# Patient Record
Sex: MALE | Race: WHITE | NOT HISPANIC OR LATINO | Employment: FULL TIME | URBAN - METROPOLITAN AREA
[De-identification: names, ages, dates, MRNs, and addresses within clinical notes are randomized per-mention and may not be internally consistent; named-entity substitution may affect disease eponyms.]

---

## 2024-09-20 ENCOUNTER — LAB REQUISITION (OUTPATIENT)
Dept: LAB | Facility: HOSPITAL | Age: 59
End: 2024-09-20
Payer: COMMERCIAL

## 2024-09-20 DIAGNOSIS — N20.1 CALCULUS OF URETER: ICD-10-CM

## 2024-09-20 PROCEDURE — 82365 CALCULUS SPECTROSCOPY: CPT | Performed by: STUDENT IN AN ORGANIZED HEALTH CARE EDUCATION/TRAINING PROGRAM

## 2024-09-30 LAB
CALCIUM OXALATE DIHYDRATE MFR STONE IR: 30 %
COLOR STONE: NORMAL
COM MFR STONE: 70 %
COMPN STONE: NORMAL
LABORATORY COMMENT REPORT: NORMAL
LABORATORY COMMENT REPORT: NORMAL
Lab: NORMAL
Lab: NORMAL
PHOTO: NORMAL
SIZE STONE: NORMAL MM
SPEC SOURCE SUBJ: NORMAL
WT STONE: 154 MG

## 2024-11-27 ENCOUNTER — LAB REQUISITION (OUTPATIENT)
Dept: LAB | Facility: HOSPITAL | Age: 59
End: 2024-11-27
Payer: COMMERCIAL

## 2024-11-27 DIAGNOSIS — N20.1 CALCULUS OF URETER: ICD-10-CM

## 2024-11-27 PROCEDURE — 82365 CALCULUS SPECTROSCOPY: CPT | Performed by: UROLOGY

## 2024-11-28 ENCOUNTER — HOSPITAL ENCOUNTER (INPATIENT)
Facility: HOSPITAL | Age: 59
LOS: 4 days | Discharge: HOME | End: 2024-12-03
Attending: EMERGENCY MEDICINE | Admitting: STUDENT IN AN ORGANIZED HEALTH CARE EDUCATION/TRAINING PROGRAM
Payer: COMMERCIAL

## 2024-11-28 ENCOUNTER — APPOINTMENT (OUTPATIENT)
Dept: CT IMAGING | Facility: HOSPITAL | Age: 59
End: 2024-11-28
Payer: COMMERCIAL

## 2024-11-28 ENCOUNTER — APPOINTMENT (OUTPATIENT)
Dept: GENERAL RADIOLOGY | Facility: HOSPITAL | Age: 59
End: 2024-11-28
Payer: COMMERCIAL

## 2024-11-28 DIAGNOSIS — A41.9 SEPSIS, DUE TO UNSPECIFIED ORGANISM, UNSPECIFIED WHETHER ACUTE ORGAN DYSFUNCTION PRESENT: Primary | ICD-10-CM

## 2024-11-28 DIAGNOSIS — N12 PYELONEPHRITIS: ICD-10-CM

## 2024-11-28 LAB
ALBUMIN SERPL-MCNC: 4 G/DL (ref 3.5–5.2)
ALBUMIN/GLOB SERPL: 1.4 G/DL
ALP SERPL-CCNC: 80 U/L (ref 39–117)
ALT SERPL W P-5'-P-CCNC: 17 U/L (ref 1–41)
ANION GAP SERPL CALCULATED.3IONS-SCNC: 10.3 MMOL/L (ref 5–15)
AST SERPL-CCNC: 16 U/L (ref 1–40)
ATMOSPHERIC PRESS: 745.8 MMHG
B-OH-BUTYR SERPL-SCNC: 0.15 MMOL/L (ref 0.02–0.27)
BACTERIA UR QL AUTO: ABNORMAL /HPF
BASE EXCESS BLDV CALC-SCNC: -1.7 MMOL/L (ref -2–2)
BASOPHILS # BLD AUTO: 0.02 10*3/MM3 (ref 0–0.2)
BASOPHILS NFR BLD AUTO: 0.2 % (ref 0–1.5)
BILIRUB SERPL-MCNC: 0.8 MG/DL (ref 0–1.2)
BILIRUB UR QL STRIP: NEGATIVE
BUN SERPL-MCNC: 31 MG/DL (ref 6–20)
BUN/CREAT SERPL: 23.7 (ref 7–25)
CALCIUM SPEC-SCNC: 9.2 MG/DL (ref 8.6–10.5)
CHLORIDE SERPL-SCNC: 98 MMOL/L (ref 98–107)
CLARITY UR: CLEAR
CO2 BLDA-SCNC: 23.7 MMOL/L (ref 23–27)
CO2 SERPL-SCNC: 24.7 MMOL/L (ref 22–29)
COLOR UR: ABNORMAL
CREAT SERPL-MCNC: 1.31 MG/DL (ref 0.76–1.27)
CRP SERPL-MCNC: 10.56 MG/DL (ref 0–0.5)
D-LACTATE SERPL-SCNC: 1 MMOL/L (ref 0.5–2)
D-LACTATE SERPL-SCNC: 2.1 MMOL/L (ref 0.5–2)
DEPRECATED RDW RBC AUTO: 40.5 FL (ref 37–54)
EGFRCR SERPLBLD CKD-EPI 2021: 62.7 ML/MIN/1.73
EOSINOPHIL # BLD AUTO: 0.47 10*3/MM3 (ref 0–0.4)
EOSINOPHIL NFR BLD AUTO: 5.6 % (ref 0.3–6.2)
ERYTHROCYTE [DISTWIDTH] IN BLOOD BY AUTOMATED COUNT: 12.3 % (ref 12.3–15.4)
FLUAV SUBTYP SPEC NAA+PROBE: NOT DETECTED
FLUBV RNA ISLT QL NAA+PROBE: NOT DETECTED
GLOBULIN UR ELPH-MCNC: 2.9 GM/DL
GLUCOSE BLDC GLUCOMTR-MCNC: 177 MG/DL (ref 70–130)
GLUCOSE SERPL-MCNC: 415 MG/DL (ref 65–99)
GLUCOSE UR STRIP-MCNC: ABNORMAL MG/DL
HCO3 BLDV-SCNC: 22.6 MMOL/L (ref 22–26)
HCT VFR BLD AUTO: 45 % (ref 37.5–51)
HGB BLD-MCNC: 15.2 G/DL (ref 13–17.7)
HGB UR QL STRIP.AUTO: ABNORMAL
HOLD SPECIMEN: NORMAL
HOLD SPECIMEN: NORMAL
HYALINE CASTS UR QL AUTO: ABNORMAL /LPF
IMM GRANULOCYTES # BLD AUTO: 0.01 10*3/MM3 (ref 0–0.05)
IMM GRANULOCYTES NFR BLD AUTO: 0.1 % (ref 0–0.5)
KETONES UR QL STRIP: NEGATIVE
LEUKOCYTE ESTERASE UR QL STRIP.AUTO: NEGATIVE
LIPASE SERPL-CCNC: 25 U/L (ref 13–60)
LYMPHOCYTES # BLD AUTO: 0.7 10*3/MM3 (ref 0.7–3.1)
LYMPHOCYTES NFR BLD AUTO: 8.3 % (ref 19.6–45.3)
MAGNESIUM SERPL-MCNC: 2.1 MG/DL (ref 1.6–2.6)
MCH RBC QN AUTO: 29.9 PG (ref 26.6–33)
MCHC RBC AUTO-ENTMCNC: 33.8 G/DL (ref 31.5–35.7)
MCV RBC AUTO: 88.6 FL (ref 79–97)
MODALITY: ABNORMAL
MONOCYTES # BLD AUTO: 0.83 10*3/MM3 (ref 0.1–0.9)
MONOCYTES NFR BLD AUTO: 9.9 % (ref 5–12)
NEUTROPHILS NFR BLD AUTO: 6.37 10*3/MM3 (ref 1.7–7)
NEUTROPHILS NFR BLD AUTO: 75.9 % (ref 42.7–76)
NITRITE UR QL STRIP: POSITIVE
PCO2 BLDV: 36.3 MM HG (ref 41–51)
PH BLDV: 7.4 PH UNITS (ref 7.31–7.41)
PH UR STRIP.AUTO: <=5 [PH] (ref 5–8)
PLATELET # BLD AUTO: 165 10*3/MM3 (ref 140–450)
PMV BLD AUTO: 9.5 FL (ref 6–12)
PO2 BLDV: 44.8 MM HG (ref 35–42)
POTASSIUM SERPL-SCNC: 4 MMOL/L (ref 3.5–5.2)
PROCALCITONIN SERPL-MCNC: 0.75 NG/ML (ref 0–0.25)
PROT SERPL-MCNC: 6.9 G/DL (ref 6–8.5)
PROT UR QL STRIP: ABNORMAL
RBC # BLD AUTO: 5.08 10*6/MM3 (ref 4.14–5.8)
RBC # UR STRIP: ABNORMAL /HPF
REF LAB TEST METHOD: ABNORMAL
SAO2 % BLDCOV: 81 % (ref 45–75)
SARS-COV-2 RNA RESP QL NAA+PROBE: NOT DETECTED
SODIUM SERPL-SCNC: 133 MMOL/L (ref 136–145)
SP GR UR STRIP: <=1.005 (ref 1–1.03)
SQUAMOUS #/AREA URNS HPF: ABNORMAL /HPF
STREP A PCR: NOT DETECTED
UROBILINOGEN UR QL STRIP: ABNORMAL
WBC # UR STRIP: ABNORMAL /HPF
WBC NRBC COR # BLD AUTO: 8.4 10*3/MM3 (ref 3.4–10.8)
WHOLE BLOOD HOLD COAG: NORMAL
YEAST URNS QL MICRO: PRESENT /HPF

## 2024-11-28 PROCEDURE — 83735 ASSAY OF MAGNESIUM: CPT | Performed by: EMERGENCY MEDICINE

## 2024-11-28 PROCEDURE — 87181 SC STD AGAR DILUTION PER AGT: CPT | Performed by: EMERGENCY MEDICINE

## 2024-11-28 PROCEDURE — 99285 EMERGENCY DEPT VISIT HI MDM: CPT | Performed by: EMERGENCY MEDICINE

## 2024-11-28 PROCEDURE — 85025 COMPLETE CBC W/AUTO DIFF WBC: CPT | Performed by: EMERGENCY MEDICINE

## 2024-11-28 PROCEDURE — 87636 SARSCOV2 & INF A&B AMP PRB: CPT | Performed by: EMERGENCY MEDICINE

## 2024-11-28 PROCEDURE — 87040 BLOOD CULTURE FOR BACTERIA: CPT | Performed by: EMERGENCY MEDICINE

## 2024-11-28 PROCEDURE — 71045 X-RAY EXAM CHEST 1 VIEW: CPT

## 2024-11-28 PROCEDURE — 87077 CULTURE AEROBIC IDENTIFY: CPT | Performed by: EMERGENCY MEDICINE

## 2024-11-28 PROCEDURE — 84145 PROCALCITONIN (PCT): CPT | Performed by: EMERGENCY MEDICINE

## 2024-11-28 PROCEDURE — 87186 SC STD MICRODIL/AGAR DIL: CPT | Performed by: EMERGENCY MEDICINE

## 2024-11-28 PROCEDURE — 83605 ASSAY OF LACTIC ACID: CPT | Performed by: EMERGENCY MEDICINE

## 2024-11-28 PROCEDURE — 25010000002 CEFTRIAXONE PER 250 MG: Performed by: PHYSICIAN ASSISTANT

## 2024-11-28 PROCEDURE — 74177 CT ABD & PELVIS W/CONTRAST: CPT

## 2024-11-28 PROCEDURE — 81001 URINALYSIS AUTO W/SCOPE: CPT | Performed by: EMERGENCY MEDICINE

## 2024-11-28 PROCEDURE — 99285 EMERGENCY DEPT VISIT HI MDM: CPT

## 2024-11-28 PROCEDURE — 80053 COMPREHEN METABOLIC PANEL: CPT | Performed by: EMERGENCY MEDICINE

## 2024-11-28 PROCEDURE — 82803 BLOOD GASES ANY COMBINATION: CPT | Performed by: EMERGENCY MEDICINE

## 2024-11-28 PROCEDURE — 82010 KETONE BODYS QUAN: CPT | Performed by: EMERGENCY MEDICINE

## 2024-11-28 PROCEDURE — 83690 ASSAY OF LIPASE: CPT | Performed by: EMERGENCY MEDICINE

## 2024-11-28 PROCEDURE — 87651 STREP A DNA AMP PROBE: CPT | Performed by: EMERGENCY MEDICINE

## 2024-11-28 PROCEDURE — 63710000001 INSULIN REGULAR HUMAN PER 5 UNITS: Performed by: EMERGENCY MEDICINE

## 2024-11-28 PROCEDURE — 87154 CUL TYP ID BLD PTHGN 6+ TRGT: CPT | Performed by: EMERGENCY MEDICINE

## 2024-11-28 PROCEDURE — 25810000003 SODIUM CHLORIDE 0.9 % SOLUTION: Performed by: EMERGENCY MEDICINE

## 2024-11-28 PROCEDURE — 36415 COLL VENOUS BLD VENIPUNCTURE: CPT

## 2024-11-28 PROCEDURE — 87086 URINE CULTURE/COLONY COUNT: CPT | Performed by: EMERGENCY MEDICINE

## 2024-11-28 PROCEDURE — 86140 C-REACTIVE PROTEIN: CPT | Performed by: EMERGENCY MEDICINE

## 2024-11-28 PROCEDURE — 82948 REAGENT STRIP/BLOOD GLUCOSE: CPT

## 2024-11-28 PROCEDURE — 25510000001 IOPAMIDOL PER 1 ML: Performed by: EMERGENCY MEDICINE

## 2024-11-28 RX ORDER — IOPAMIDOL 755 MG/ML
100 INJECTION, SOLUTION INTRAVASCULAR
Status: COMPLETED | OUTPATIENT
Start: 2024-11-28 | End: 2024-11-28

## 2024-11-28 RX ORDER — ACETAMINOPHEN 500 MG
1000 TABLET ORAL ONCE
Status: COMPLETED | OUTPATIENT
Start: 2024-11-28 | End: 2024-11-28

## 2024-11-28 RX ADMIN — INSULIN HUMAN 8 UNITS: 100 INJECTION, SOLUTION PARENTERAL at 20:51

## 2024-11-28 RX ADMIN — IOPAMIDOL 100 ML: 755 INJECTION, SOLUTION INTRAVENOUS at 21:14

## 2024-11-28 RX ADMIN — ACETAMINOPHEN 1000 MG: 500 TABLET ORAL at 20:06

## 2024-11-28 RX ADMIN — SODIUM CHLORIDE 1000 ML: 9 INJECTION, SOLUTION INTRAVENOUS at 20:06

## 2024-11-28 RX ADMIN — SODIUM CHLORIDE 1000 ML: 9 INJECTION, SOLUTION INTRAVENOUS at 21:18

## 2024-11-28 RX ADMIN — CEFTRIAXONE 2000 MG: 2 INJECTION, POWDER, FOR SOLUTION INTRAMUSCULAR; INTRAVENOUS at 20:36

## 2024-11-29 PROBLEM — E11.65 TYPE 2 DIABETES MELLITUS WITH HYPERGLYCEMIA, WITHOUT LONG-TERM CURRENT USE OF INSULIN: Status: ACTIVE | Noted: 2024-11-29

## 2024-11-29 PROBLEM — N39.0 ACUTE UTI (URINARY TRACT INFECTION): Status: ACTIVE | Noted: 2024-11-29

## 2024-11-29 LAB
ANION GAP SERPL CALCULATED.3IONS-SCNC: 11 MMOL/L (ref 5–15)
BUN SERPL-MCNC: 22 MG/DL (ref 6–20)
BUN/CREAT SERPL: 22 (ref 7–25)
CALCIUM SPEC-SCNC: 8.2 MG/DL (ref 8.6–10.5)
CHLORIDE SERPL-SCNC: 106 MMOL/L (ref 98–107)
CO2 SERPL-SCNC: 21 MMOL/L (ref 22–29)
CREAT SERPL-MCNC: 1 MG/DL (ref 0.76–1.27)
DEPRECATED RDW RBC AUTO: 38.8 FL (ref 37–54)
EGFRCR SERPLBLD CKD-EPI 2021: 86.7 ML/MIN/1.73
ERYTHROCYTE [DISTWIDTH] IN BLOOD BY AUTOMATED COUNT: 12.2 % (ref 12.3–15.4)
GLUCOSE BLDC GLUCOMTR-MCNC: 116 MG/DL (ref 70–130)
GLUCOSE BLDC GLUCOMTR-MCNC: 124 MG/DL (ref 70–130)
GLUCOSE BLDC GLUCOMTR-MCNC: 151 MG/DL (ref 70–130)
GLUCOSE BLDC GLUCOMTR-MCNC: 183 MG/DL (ref 70–130)
GLUCOSE SERPL-MCNC: 135 MG/DL (ref 65–99)
HBA1C MFR BLD: 8.6 % (ref 4.8–5.6)
HCT VFR BLD AUTO: 38.6 % (ref 37.5–51)
HGB BLD-MCNC: 13.3 G/DL (ref 13–17.7)
MCH RBC QN AUTO: 30.1 PG (ref 26.6–33)
MCHC RBC AUTO-ENTMCNC: 34.5 G/DL (ref 31.5–35.7)
MCV RBC AUTO: 87.3 FL (ref 79–97)
PLATELET # BLD AUTO: 166 10*3/MM3 (ref 140–450)
PMV BLD AUTO: 9.6 FL (ref 6–12)
POTASSIUM SERPL-SCNC: 3.7 MMOL/L (ref 3.5–5.2)
RBC # BLD AUTO: 4.42 10*6/MM3 (ref 4.14–5.8)
SODIUM SERPL-SCNC: 138 MMOL/L (ref 136–145)
WBC NRBC COR # BLD AUTO: 9.72 10*3/MM3 (ref 3.4–10.8)

## 2024-11-29 PROCEDURE — 82948 REAGENT STRIP/BLOOD GLUCOSE: CPT

## 2024-11-29 PROCEDURE — 80048 BASIC METABOLIC PNL TOTAL CA: CPT

## 2024-11-29 PROCEDURE — 25010000002 CEFTRIAXONE PER 250 MG

## 2024-11-29 PROCEDURE — 25810000003 SODIUM CHLORIDE 0.9 % SOLUTION

## 2024-11-29 PROCEDURE — 83036 HEMOGLOBIN GLYCOSYLATED A1C: CPT

## 2024-11-29 PROCEDURE — 85027 COMPLETE CBC AUTOMATED: CPT

## 2024-11-29 PROCEDURE — 63710000001 INSULIN LISPRO (HUMAN) PER 5 UNITS

## 2024-11-29 RX ORDER — DEXTROSE MONOHYDRATE 25 G/50ML
25 INJECTION, SOLUTION INTRAVENOUS
Status: DISCONTINUED | OUTPATIENT
Start: 2024-11-29 | End: 2024-12-03 | Stop reason: HOSPADM

## 2024-11-29 RX ORDER — FLUCONAZOLE 200 MG/1
200 TABLET ORAL EVERY 24 HOURS
Status: DISCONTINUED | OUTPATIENT
Start: 2024-11-29 | End: 2024-11-30

## 2024-11-29 RX ORDER — OXYCODONE AND ACETAMINOPHEN 7.5; 325 MG/1; MG/1
1 TABLET ORAL EVERY 6 HOURS PRN
COMMUNITY

## 2024-11-29 RX ORDER — ONDANSETRON 4 MG/1
4 TABLET, ORALLY DISINTEGRATING ORAL EVERY 6 HOURS PRN
Status: DISCONTINUED | OUTPATIENT
Start: 2024-11-29 | End: 2024-12-03 | Stop reason: HOSPADM

## 2024-11-29 RX ORDER — ROSUVASTATIN CALCIUM 20 MG/1
20 TABLET, COATED ORAL DAILY
Status: DISCONTINUED | OUTPATIENT
Start: 2024-11-29 | End: 2024-12-03 | Stop reason: HOSPADM

## 2024-11-29 RX ORDER — OXYCODONE AND ACETAMINOPHEN 7.5; 325 MG/1; MG/1
1 TABLET ORAL EVERY 4 HOURS PRN
Status: DISPENSED | OUTPATIENT
Start: 2024-11-29 | End: 2024-12-01

## 2024-11-29 RX ORDER — OXYCODONE AND ACETAMINOPHEN 7.5; 325 MG/1; MG/1
1 TABLET ORAL EVERY 6 HOURS PRN
Status: COMPLETED | OUTPATIENT
Start: 2024-11-29 | End: 2024-11-29

## 2024-11-29 RX ORDER — POLYETHYLENE GLYCOL 3350 17 G/17G
17 POWDER, FOR SOLUTION ORAL DAILY PRN
Status: DISCONTINUED | OUTPATIENT
Start: 2024-11-29 | End: 2024-12-03 | Stop reason: HOSPADM

## 2024-11-29 RX ORDER — ONDANSETRON 2 MG/ML
4 INJECTION INTRAMUSCULAR; INTRAVENOUS EVERY 6 HOURS PRN
Status: DISCONTINUED | OUTPATIENT
Start: 2024-11-29 | End: 2024-12-03 | Stop reason: HOSPADM

## 2024-11-29 RX ORDER — BISACODYL 5 MG/1
5 TABLET, DELAYED RELEASE ORAL DAILY PRN
Status: DISCONTINUED | OUTPATIENT
Start: 2024-11-29 | End: 2024-12-03 | Stop reason: HOSPADM

## 2024-11-29 RX ORDER — INSULIN LISPRO 100 [IU]/ML
2-7 INJECTION, SOLUTION INTRAVENOUS; SUBCUTANEOUS
Status: DISCONTINUED | OUTPATIENT
Start: 2024-11-29 | End: 2024-12-03 | Stop reason: HOSPADM

## 2024-11-29 RX ORDER — TAMSULOSIN HYDROCHLORIDE 0.4 MG/1
0.4 CAPSULE ORAL DAILY
Status: DISCONTINUED | OUTPATIENT
Start: 2024-11-29 | End: 2024-12-03 | Stop reason: HOSPADM

## 2024-11-29 RX ORDER — NICOTINE POLACRILEX 4 MG
15 LOZENGE BUCCAL
Status: DISCONTINUED | OUTPATIENT
Start: 2024-11-29 | End: 2024-12-03 | Stop reason: HOSPADM

## 2024-11-29 RX ORDER — IBUPROFEN 600 MG/1
1 TABLET ORAL
Status: DISCONTINUED | OUTPATIENT
Start: 2024-11-29 | End: 2024-12-03 | Stop reason: HOSPADM

## 2024-11-29 RX ORDER — METHOCARBAMOL 750 MG/1
750 TABLET, FILM COATED ORAL 3 TIMES DAILY PRN
Status: DISCONTINUED | OUTPATIENT
Start: 2024-11-29 | End: 2024-12-03 | Stop reason: HOSPADM

## 2024-11-29 RX ORDER — PHENAZOPYRIDINE HYDROCHLORIDE 200 MG/1
200 TABLET, FILM COATED ORAL
Status: COMPLETED | OUTPATIENT
Start: 2024-11-29 | End: 2024-12-01

## 2024-11-29 RX ORDER — BISACODYL 10 MG
10 SUPPOSITORY, RECTAL RECTAL DAILY PRN
Status: DISCONTINUED | OUTPATIENT
Start: 2024-11-29 | End: 2024-12-03 | Stop reason: HOSPADM

## 2024-11-29 RX ORDER — AMOXICILLIN 250 MG
2 CAPSULE ORAL 2 TIMES DAILY PRN
Status: DISCONTINUED | OUTPATIENT
Start: 2024-11-29 | End: 2024-12-03 | Stop reason: HOSPADM

## 2024-11-29 RX ORDER — NITROGLYCERIN 0.4 MG/1
0.4 TABLET SUBLINGUAL
Status: DISCONTINUED | OUTPATIENT
Start: 2024-11-29 | End: 2024-12-03 | Stop reason: HOSPADM

## 2024-11-29 RX ORDER — ACETAMINOPHEN 325 MG/1
650 TABLET ORAL EVERY 4 HOURS PRN
Status: DISCONTINUED | OUTPATIENT
Start: 2024-11-29 | End: 2024-12-03 | Stop reason: HOSPADM

## 2024-11-29 RX ORDER — SODIUM CHLORIDE 9 MG/ML
100 INJECTION, SOLUTION INTRAVENOUS CONTINUOUS
Status: ACTIVE | OUTPATIENT
Start: 2024-11-29 | End: 2024-11-29

## 2024-11-29 RX ADMIN — SODIUM CHLORIDE 100 ML/HR: 9 INJECTION, SOLUTION INTRAVENOUS at 03:39

## 2024-11-29 RX ADMIN — OXYCODONE AND ACETAMINOPHEN 1 TABLET: 7.5; 325 TABLET ORAL at 04:35

## 2024-11-29 RX ADMIN — OXYCODONE AND ACETAMINOPHEN 1 TABLET: 7.5; 325 TABLET ORAL at 20:32

## 2024-11-29 RX ADMIN — ROSUVASTATIN CALCIUM 20 MG: 20 TABLET, FILM COATED ORAL at 08:49

## 2024-11-29 RX ADMIN — OXYCODONE AND ACETAMINOPHEN 1 TABLET: 7.5; 325 TABLET ORAL at 15:38

## 2024-11-29 RX ADMIN — INSULIN LISPRO 2 UNITS: 100 INJECTION, SOLUTION INTRAVENOUS; SUBCUTANEOUS at 12:09

## 2024-11-29 RX ADMIN — ACETAMINOPHEN 650 MG: 325 TABLET ORAL at 03:38

## 2024-11-29 RX ADMIN — FLUCONAZOLE 200 MG: 200 TABLET ORAL at 15:38

## 2024-11-29 RX ADMIN — OXYCODONE AND ACETAMINOPHEN 1 TABLET: 7.5; 325 TABLET ORAL at 10:59

## 2024-11-29 RX ADMIN — ACETAMINOPHEN 650 MG: 325 TABLET ORAL at 08:51

## 2024-11-29 RX ADMIN — TAMSULOSIN HYDROCHLORIDE 0.4 MG: 0.4 CAPSULE ORAL at 15:38

## 2024-11-29 RX ADMIN — CEFTRIAXONE SODIUM 1000 MG: 1 INJECTION, POWDER, FOR SOLUTION INTRAMUSCULAR; INTRAVENOUS at 20:28

## 2024-11-29 RX ADMIN — PHENAZOPYRIDINE HYDROCHLORIDE 200 MG: 200 TABLET ORAL at 17:44

## 2024-11-29 NOTE — [facility] PROVIDER NOTE
EMERGENCY DEPARTMENT ENCOUNTER    Room Number:  03/03  Date seen:  11/28/2024  Time seen: 20:00 EST  PCP: Ta Estevez MD  Historian:     Discussed/obtained information from independent historians:     HPI:    The patient is a 59-year-old male.  He presents with reported hyperglycemia at home today.  Of note he underwent lithotripsy with right-sided stent placement yesterday.  He does report that he had a fever couple days ago but was afebrile yesterday prior to the procedure.  Today his wife did note some chills.  He was unaware that he had developed a fever prior to arrival here.  He was noted to be febrile to 102.9 at triage.  He denies any respiratory symptoms such as sore throat nasal congestion or cough.  He is on Pyridium so cannot really comment on the color of his urine.  No significant flank or abdominal pain at this time.  No sick contacts at home.  No shortness of breath currently      External (non-ED) record review:        Chronic or social conditions impacting care:    ALLERGIES  Patient has no known allergies.    PAST MEDICAL HISTORY  Active Ambulatory Problems     Diagnosis Date Noted    Type 2 diabetes mellitus without complication, without long-term current use of insulin 06/09/2020    Elevated PSA 08/23/2022    Erectile dysfunction 08/23/2022    Hormone disorder 08/23/2022    Kidney stone 08/23/2022    ADD (attention deficit disorder) 08/23/2022    BEAR (obstructive sleep apnea) 04/18/2023     Resolved Ambulatory Problems     Diagnosis Date Noted    No Resolved Ambulatory Problems     Past Medical History:   Diagnosis Date    Diabetes mellitus        PAST SURGICAL HISTORY  Past Surgical History:   Procedure Laterality Date    CYSTOSCOPY W/ URETERAL STENT PLACEMENT Left 8/30/2024    Procedure: CYSTOSCOPY URETERAL CATHETER/STENT INSERTION;  Surgeon: Ayush Waldron MD;  Location: [facility];  Service: Urology;  Laterality: Left;    EXTRACORPOREAL SHOCK WAVE LITHOTRIPSY (ESWL) Left  8/30/2024    Procedure: EXTRACORPOREAL SHOCKWAVE LITHOTRIPSY;  Surgeon: Ayush Waldron MD;  Location: [facility] OR;  Service: Urology;  Laterality: Left;    PROSTATE ULTRASOUND BIOPSY  04/2017    negative    VASECTOMY         FAMILY HISTORY  Family History   Problem Relation Age of Onset    No Known Problems Father     No Known Problems Mother        SOCIAL HISTORY  Social History     Socioeconomic History    Marital status:    Tobacco Use    Smoking status: Never    Smokeless tobacco: Former     Types: Chew   Substance and Sexual Activity    Alcohol use: Yes     Comment: A couple glasses of bourbon /mo    Drug use: No    Sexual activity: Yes     Partners: Female     Birth control/protection: Surgical       REVIEW OF SYSTEMS  Review of Systems    All systems reviewed and negative except for those discussed in HPI.       PHYSICAL EXAM    I have reviewed the triage vital signs and nursing notes.  Vitals:    11/28/24 2300   BP: 114/67   Pulse: 90   Resp:    Temp:    SpO2: 91%     Physical Exam    Vital signs: Reviewed in nurses notes    General: Patient is awake alert.  He does appear to feel poorly but is in no respiratory distress    HEENT: Normocephalic atraumatic nasopharynx clear.  Oropharynx is slightly erythematous and slightly dry.    Neck:   Supple without meningismus.    Respiratory:   Clear to auscultation bilaterally with equal breath sounds    Cardiovascular: Rate rate and rhythm no murmurs.  No pretibial or pedal edema    Abdomen: Very soft nondistended.  Very minimal right lower quadrant and right flank tenderness to deep palpation without guarding or rebound    Skin:   Warm and dry.  No rashes noted    Neurological examination: Patient is awake alert oriented x4.  Speech is normal.  No facial palsy.  No focal motor or sensory deficits.      LAB RESULTS  Recent Results (from the past 24 hours)   Procalcitonin    Collection Time: 11/28/24  7:52 PM    Specimen: Blood   Result Value Ref Range     Procalcitonin 0.75 (H) 0.00 - 0.25 ng/mL   C-reactive Protein    Collection Time: 11/28/24  7:52 PM    Specimen: Blood   Result Value Ref Range    C-Reactive Protein 10.56 (H) 0.00 - 0.50 mg/dL   Gold Top - SST    Collection Time: 11/28/24  7:52 PM   Result Value Ref Range    Extra Tube Hold for add-ons.    Light Blue Top    Collection Time: 11/28/24  7:52 PM   Result Value Ref Range    Extra Tube Hold for add-ons.    Beta Hydroxybutyrate Quantitative    Collection Time: 11/28/24  7:52 PM    Specimen: Blood   Result Value Ref Range    Beta-Hydroxybutyrate Quant 0.155 0.020 - 0.270 mmol/L   Comprehensive Metabolic Panel    Collection Time: 11/28/24  7:53 PM    Specimen: Blood   Result Value Ref Range    Glucose 415 (C) 65 - 99 mg/dL    BUN 31 (H) 6 - 20 mg/dL    Creatinine 1.31 (H) 0.76 - 1.27 mg/dL    Sodium 133 (L) 136 - 145 mmol/L    Potassium 4.0 3.5 - 5.2 mmol/L    Chloride 98 98 - 107 mmol/L    CO2 24.7 22.0 - 29.0 mmol/L    Calcium 9.2 8.6 - 10.5 mg/dL    Total Protein 6.9 6.0 - 8.5 g/dL    Albumin 4.0 3.5 - 5.2 g/dL    ALT (SGPT) 17 1 - 41 U/L    AST (SGOT) 16 1 - 40 U/L    Alkaline Phosphatase 80 39 - 117 U/L    Total Bilirubin 0.8 0.0 - 1.2 mg/dL    Globulin 2.9 gm/dL    A/G Ratio 1.4 g/dL    BUN/Creatinine Ratio 23.7 7.0 - 25.0    Anion Gap 10.3 5.0 - 15.0 mmol/L    eGFR 62.7 >60.0 mL/min/1.73   Lactic Acid, Plasma    Collection Time: 11/28/24  7:53 PM    Specimen: Blood   Result Value Ref Range    Lactate 2.1 (C) 0.5 - 2.0 mmol/L   CBC Auto Differential    Collection Time: 11/28/24  7:53 PM    Specimen: Blood   Result Value Ref Range    WBC 8.40 3.40 - 10.80 10*3/mm3    RBC 5.08 4.14 - 5.80 10*6/mm3    Hemoglobin 15.2 13.0 - 17.7 g/dL    Hematocrit 45.0 37.5 - 51.0 %    MCV 88.6 79.0 - 97.0 fL    MCH 29.9 26.6 - 33.0 pg    MCHC 33.8 31.5 - 35.7 g/dL    RDW 12.3 12.3 - 15.4 %    RDW-SD 40.5 37.0 - 54.0 fl    MPV 9.5 6.0 - 12.0 fL    Platelets 165 140 - 450 10*3/mm3    Neutrophil % 75.9 42.7 - 76.0 %     Lymphocyte % 8.3 (L) 19.6 - 45.3 %    Monocyte % 9.9 5.0 - 12.0 %    Eosinophil % 5.6 0.3 - 6.2 %    Basophil % 0.2 0.0 - 1.5 %    Immature Grans % 0.1 0.0 - 0.5 %    Neutrophils, Absolute 6.37 1.70 - 7.00 10*3/mm3    Lymphocytes, Absolute 0.70 0.70 - 3.10 10*3/mm3    Monocytes, Absolute 0.83 0.10 - 0.90 10*3/mm3    Eosinophils, Absolute 0.47 (H) 0.00 - 0.40 10*3/mm3    Basophils, Absolute 0.02 0.00 - 0.20 10*3/mm3    Immature Grans, Absolute 0.01 0.00 - 0.05 10*3/mm3   Lipase    Collection Time: 11/28/24  7:53 PM    Specimen: Blood   Result Value Ref Range    Lipase 25 13 - 60 U/L   Magnesium    Collection Time: 11/28/24  7:53 PM    Specimen: Blood   Result Value Ref Range    Magnesium 2.1 1.6 - 2.6 mg/dL   Rapid Strep A Screen - Swab, Throat    Collection Time: 11/28/24  8:02 PM    Specimen: Throat; Swab   Result Value Ref Range    STREP A PCR Not Detected Not Detected   COVID-19 and FLU A/B PCR, 1 HR TAT - Swab, Nasopharynx    Collection Time: 11/28/24  8:02 PM    Specimen: Nasopharynx; Swab   Result Value Ref Range    COVID19 Not Detected Not Detected - Ref. Range    Influenza A PCR Not Detected Not Detected    Influenza B PCR Not Detected Not Detected   Urinalysis With Culture If Indicated - Urine, Clean Catch    Collection Time: 11/28/24  8:16 PM    Specimen: Urine, Clean Catch   Result Value Ref Range    Color, UA Orange (A) Yellow, Straw    Appearance, UA Clear Clear    pH, UA <=5.0 5.0 - 8.0    Specific Gravity, UA <=1.005 1.005 - 1.030    Glucose, UA >=1000 mg/dL (3+) (A) Negative    Ketones, UA Negative Negative    Bilirubin, UA Negative Negative    Blood, UA Large (3+) (A) Negative    Protein,  mg/dL (2+) (A) Negative    Leuk Esterase, UA Negative Negative    Nitrite, UA Positive (A) Negative    Urobilinogen, UA 0.2 E.U./dL 0.2 - 1.0 E.U./dL   Urinalysis, Microscopic Only - Urine, Clean Catch    Collection Time: 11/28/24  8:16 PM    Specimen: Urine, Clean Catch   Result Value Ref Range    RBC, UA  Too Numerous to Count (A) None Seen, 0-2 /HPF    WBC, UA 6-10 (A) None Seen, 0-2 /HPF    Bacteria, UA None Seen None Seen /HPF    Squamous Epithelial Cells, UA 0-2 None Seen, 0-2 /HPF    Yeast, UA Present (A) None Seen /HPF    Hyaline Casts, UA 3-6 None Seen /LPF    Methodology Automated Microscopy    [place] Urine Culture Tube - Urine, Clean Catch    Collection Time: 11/28/24  8:16 PM    Specimen: Urine, Clean Catch   Result Value Ref Range    Extra Tube Hold for add-ons.    Blood Gas, Venous -    Collection Time: 11/28/24  9:26 PM    Specimen: Venous Blood   Result Value Ref Range    pH, Venous 7.403 7.310 - 7.410 pH Units    pCO2, Venous 36.3 (L) 41.0 - 51.0 mm Hg    pO2, Venous 44.8 (H) 35.0 - 42.0 mm Hg    HCO3, Venous 22.6 22.0 - 26.0 mmol/L    Base Excess, Venous -1.7 -2.0 - 2.0 mmol/L    O2 Saturation, Venous 81.0 (H) 45.0 - 75.0 %    CO2 Content 23.7 23 - 27 mmol/L    Barometric Pressure for Blood Gas 745.8000 mmHg    Modality Room Air    POC Glucose Once    Collection Time: 11/28/24 10:22 PM    Specimen: Blood   Result Value Ref Range    Glucose 177 (H) 70 - 130 mg/dL       Ordered the above labs and independently interpreted results.  My findings will be discussed in the ED course or medical decision making section below      PROCEDURES:  Procedures      RADIOLOGY RESULTS  CT Abdomen Pelvis With Contrast    Result Date: 11/28/2024  CT OF THE ABDOMEN PELVIS WITH CONTRAST  HISTORY: Sepsis  COMPARISON: August 30, 2024  TECHNIQUE: Axial CT imaging was obtained through the abdomen and pelvis. IV contrast was administered.  FINDINGS: Images through the lung bases demonstrate dependent atelectasis. A few tiny cysts are noted within the liver. There is a small hiatal hernia. The duodenum, adrenal glands, and spleen are normal. There is some minimal pancreatic atrophy. Gallbladder is normal. The patient has a double-J ureteral stent noted on the right. No hydronephrosis is seen. There is some perinephric stranding  which is noted on the right, as well as heterogeneous enhancement of the right kidney, suggesting pyelonephritis. There is a stable 5 mm nonobstructing stone within the right kidney. A previously noted small staghorn calculus within the right kidney is no longer identified. The patient does have a small stone fragment within the urinary bladder. No stones are noted on the left. There is no hydronephrosis on the left. There is air within the urinary bladder, in keeping with recent procedure. The prostate gland is enlarged and contains dystrophic calcifications. There is no bowel obstruction. There is colonic diverticulosis. The appendix is normal. No acute osseous abnormalities are seen. There are small bilateral fat-containing inguinal hernias.      The patient has undergone placement of a double-J ureteral stent on the right, as well as right-sided lithotripsy. There is no hydronephrosis, but the patient does have perinephric stranding and heterogeneous enhancement of the right kidney, concerning for pyelonephritis.  Radiation dose reduction techniques were utilized, including automated exposure control and exposure modulation based on body size.   This report was finalized on 11/28/2024 9:33 PM by Dr. Anel Recinos M.D on Workstation: [email or web address]      XR Chest 1 View    Result Date: 11/28/2024  SINGLE VIEW OF THE CHEST  HISTORY: Fever  COMPARISON: None available.  FINDINGS: Heart size is within normal limits. No pneumothorax, pleural effusion, or acute infiltrate is seen.      No acute findings.  This report was finalized on 11/28/2024 8:43 PM by Dr. Anel Recinos M.D on Workstation: [email or web address]        Ordered the above noted radiological studies.  Independently interpreted by me.  My findings will be discussed in the medical decision section below.     PROGRESS, DATA ANALYSIS, CONSULTS AND MEDICAL DECISION MAKING    Please note that this section constitutes my independent interpretation of clinical data  including lab results, radiology, EKG's.  This constitutes my independent professional opinion regarding differential diagnosis and management of this patient.  It may include any factors such as history from outside sources, review of external records, social determinants of health, management of medications, response to those treatments, and discussions with other providers.    ED Course as of 11/28/24 2315   Thu Nov 28, 2024   2304 [facility] paged for admission.  [facility], has kindly accepted him for admission under care of Dr. Fam    Patient is much improved at this time.  His temperature has improved to 99.8.   Glucose repeat after hydration and insulin is 177.    Blood cultures x 2 as well as urine culture obtained.  Patient has been dosed with Rocephin 2 g IV.  CT reveals stranding on the right kidney consistent with pyelonephritis.  No other intra-abdominal abnormalities.  Stent noted to be in place.  Chest x-ray is clear.   [TC]      ED Course User Index  [TC] Maxwell Fernández MD     Orders placed during this visit:  Orders Placed This Encounter   Procedures    Rapid Strep A Screen - Swab, Throat    Blood Culture - Blood,    Blood Culture - Blood,    COVID-19 and FLU A/B PCR, 1 HR TAT - Swab, Nasopharynx    Urine Culture - Urine,    XR Chest 1 View    CT Abdomen Pelvis With Contrast    Comprehensive Metabolic Panel    Lactic Acid, Plasma    CBC Auto Differential    Lipase    Magnesium    Urinalysis With Culture If Indicated -    Procalcitonin    C-reactive Protein    [place] Draw    Urinalysis, Microscopic Only - Urine, Clean Catch    [place] Urine Culture Tube -    STAT Lactic Acid, Reflex    Blood Gas, Venous -    Beta Hydroxybutyrate Quantitative    POC Glucose Once    Inpatient Admission    CBC & Differential    Gold Top - SST    Light Blue Top    Ketone Bodies, Serum (Not performed at [facility])    ED Acknowledgement Form Needed;       Medications   acetaminophen (TYLENOL) tablet 1,000 mg (1,000 mg  Oral Given 11/28/24 2006)   sodium chloride 0.9 % bolus 1,000 mL (0 mL Intravenous Stopped 11/28/24 2117)   cefTRIAXone (ROCEPHIN) 2,000 mg in sodium chloride 0.9 % 100 mL MBP (0 mg Intravenous Stopped 11/28/24 2117)   sodium chloride 0.9 % bolus 1,000 mL (0 mL Intravenous Stopped 11/28/24 2219)   insulin regular (humuLIN R,novoLIN R) injection 8 Units (8 Units Intravenous Given 11/28/24 2051)   iopamidol (ISOVUE-370) 76 % injection 100 mL (100 mL Intravenous Given 11/28/24 2114)            Medical Decision Making          DIAGNOSIS  Final diagnoses:   Sepsis, due to unspecified organism, unspecified whether acute organ dysfunction present   Pyelonephritis          Medication List      No changes were made to your prescriptions during this visit.         FOLLOW-UP  No follow-up provider specified.      Latest Documented Vital Signs:  As of 23:15 EST  BP- 114/67 HR- 90 Temp- 99.8 °F (37.7 °C) (Oral) O2 sat- 91%    Appropriate PPE utilized throughout this patient encounter to include mask, if indicated, per current protocol. Hand hygiene was performed before donning PPE and after removal when leaving the room.    Please note that portions of this were completed with a voice recognition program.     Note Disclaimer: At [facility], we believe that sharing information builds trust and better relationships. You are receiving this note because you are receiving care at [facility] or recently visited. It is possible you will see health information before a provider has talked with you about it. This kind of information can be easy to misunderstand. To help you fully understand what it means for your health, we urge you to discuss this note with your provider.

## 2024-11-29 NOTE — ED NOTES
Nursing report ED to floor  Maurizio Dejesus  59 y.o.  male    HPI :  HPI  Stated Reason for Visit: Hyperglycemia; Fever    Chief Complaint  Chief Complaint   Patient presents with    Hyperglycemia       Admitting doctor:   Miryam Fam DO    Admitting diagnosis:   The primary encounter diagnosis was Sepsis, due to unspecified organism, unspecified whether acute organ dysfunction present. A diagnosis of Pyelonephritis was also pertinent to this visit.    Code status:   Current Code Status       Date Active Code Status Order ID Comments User Context       Not on file            Allergies:   Patient has no known allergies.    Isolation:   No active isolations    Intake and Output  No intake or output data in the 24 hours ending 11/28/24 2326    Weight:       11/28/24  1946   Weight: 87.5 kg (193 lb)       Most recent vitals:   Vitals:    11/28/24 2116 11/28/24 2130 11/28/24 2233 11/28/24 2300   BP:  110/56  114/67   Pulse: 100 98  90   Resp:       Temp:   99.8 °F (37.7 °C)    TempSrc:   Oral    SpO2: 95% 95%  91%   Weight:       Height:           Active LDAs/IV Access:   Lines, Drains & Airways       Active LDAs       Name Placement date Placement time Site Days    Peripheral IV 11/28/24 1953 Right Antecubital 11/28/24 1953  Antecubital  less than 1    Peripheral IV 11/28/24 2051 Left Antecubital 11/28/24 2051  Antecubital  less than 1    Urethral Catheter Silicone 16 Fr. 08/30/24  1805  -- 90                    Labs (abnormal labs have a star):   Labs Reviewed   COMPREHENSIVE METABOLIC PANEL - Abnormal; Notable for the following components:       Result Value    Glucose 415 (*)     BUN 31 (*)     Creatinine 1.31 (*)     Sodium 133 (*)     All other components within normal limits    Narrative:     GFR Normal >60  Chronic Kidney Disease <60  Kidney Failure <15     LACTIC ACID, PLASMA - Abnormal; Notable for the following components:    Lactate 2.1 (*)     All other components within normal limits   CBC WITH AUTO  "DIFFERENTIAL - Abnormal; Notable for the following components:    Lymphocyte % 8.3 (*)     Eosinophils, Absolute 0.47 (*)     All other components within normal limits   URINALYSIS W/ CULTURE IF INDICATED - Abnormal; Notable for the following components:    Color, UA Orange (*)     Glucose, UA >=1000 mg/dL (3+) (*)     Blood, UA Large (3+) (*)     Protein,  mg/dL (2+) (*)     Nitrite, UA Positive (*)     All other components within normal limits    Narrative:     In absence of clinical symptoms, the presence of pyuria, bacteria, and/or nitrites on the urinalysis result does not correlate with infection.   PROCALCITONIN - Abnormal; Notable for the following components:    Procalcitonin 0.75 (*)     All other components within normal limits    Narrative:     As a Marker for Sepsis (Non-Neonates):    1. <0.5 ng/mL represents a low risk of severe sepsis and/or septic shock.  2. >2 ng/mL represents a high risk of severe sepsis and/or septic shock.    As a Marker for Lower Respiratory Tract Infections that require antibiotic therapy:    PCT on Admission    Antibiotic Therapy       6-12 Hrs later    >0.5                Strongly Recommended  >0.25 - <0.5        Recommended   0.1 - 0.25          Discouraged              Remeasure/reassess PCT  <0.1                Strongly Discouraged     Remeasure/reassess PCT    As 28 day mortality risk marker: \"Change in Procalcitonin Result\" (>80% or <=80%) if Day 0 (or Day 1) and Day 4 values are available. Refer to http://www.[facility]-pct-calculator.com    Change in PCT <=80%  A decrease of PCT levels below or equal to 80% defines a positive change in PCT test result representing a higher risk for 28-day all-cause mortality of patients diagnosed with severe sepsis for septic shock.    Change in PCT >80%  A decrease of PCT levels of more than 80% defines a negative change in PCT result representing a lower risk for 28-day all-cause mortality of patients diagnosed with severe sepsis " or septic shock.      C-REACTIVE PROTEIN - Abnormal; Notable for the following components:    C-Reactive Protein 10.56 (*)     All other components within normal limits   URINALYSIS, MICROSCOPIC ONLY - Abnormal; Notable for the following components:    RBC, UA Too Numerous to Count (*)     WBC, UA 6-10 (*)     Yeast, UA Present (*)     All other components within normal limits   BLOOD GAS, VENOUS - Abnormal; Notable for the following components:    pCO2, Venous 36.3 (*)     pO2, Venous 44.8 (*)     O2 Saturation, Venous 81.0 (*)     All other components within normal limits   POCT GLUCOSE FINGERSTICK - Abnormal; Notable for the following components:    Glucose 177 (*)     All other components within normal limits   RAPID STREP A SCREEN - Normal   COVID-19 AND FLU A/B, NP SWAB IN TRANSPORT MEDIA 1 HR TAT - Normal    Narrative:     Fact sheet for providers: https://www.fda.gov/media/484721/download    Fact sheet for patients: https://www.fda.gov/media/673715/download    Test performed by PCR.   LIPASE - Normal   MAGNESIUM - Normal   LACTIC ACID, REFLEX - Normal   BETA HYDROXYBUTYRATE QUANTITATIVE - Normal    Narrative:     In the assessment of possible diabetic ketoacidosis, the test should be interpreted along with other clinical and laboratory findings.  A level greater than 1 mmol/L should require further evaluation and levels of more than 3 mmol/L require immediate medical review.   BLOOD CULTURE   BLOOD CULTURE   URINE CULTURE   RAINBOW DRAW    Narrative:     The following orders were created for panel order [place] Draw.  Procedure                               Abnormality         Status                     ---------                               -----------         ------                     Gold Top - [facility][[id number]]                                   Final result               Light Blue Top[[id number]]                                   Final result                 Please view results for these tests on the  individual orders.   [place] URINE CULTURE TUBE   CBC AND DIFFERENTIAL    Narrative:     The following orders were created for panel order CBC & Differential.  Procedure                               Abnormality         Status                     ---------                               -----------         ------                     CBC Auto Differential[[id number]]        Abnormal            Final result                 Please view results for these tests on the individual orders.   GOLD TOP - SST   LIGHT BLUE TOP   KETONE BODIES SERUM    Narrative:     The following orders were created for panel order Ketone Bodies, Serum (Not performed at [place]).  Procedure                               Abnormality         Status                     ---------                               -----------         ------                     Beta Hydroxybutyrate Reilly...[[id number]]  Normal              Final result                 Please view results for these tests on the individual orders.       EKG:   No orders to display       Meds given in ED:   Medications   acetaminophen (TYLENOL) tablet 1,000 mg (1,000 mg Oral Given 11/28/24 2006)   sodium chloride 0.9 % bolus 1,000 mL (0 mL Intravenous Stopped 11/28/24 2117)   cefTRIAXone (ROCEPHIN) 2,000 mg in sodium chloride 0.9 % 100 mL MBP (0 mg Intravenous Stopped 11/28/24 2117)   sodium chloride 0.9 % bolus 1,000 mL (0 mL Intravenous Stopped 11/28/24 2219)   insulin regular (humuLIN R,novoLIN R) injection 8 Units (8 Units Intravenous Given 11/28/24 2051)   iopamidol (ISOVUE-370) 76 % injection 100 mL (100 mL Intravenous Given 11/28/24 2114)       Imaging results:  CT Abdomen Pelvis With Contrast    Result Date: 11/28/2024  The patient has undergone placement of a double-J ureteral stent on the right, as well as right-sided lithotripsy. There is no hydronephrosis, but the patient does have perinephric stranding and heterogeneous enhancement of the right kidney, concerning for  pyelonephritis.  Radiation dose reduction techniques were utilized, including automated exposure control and exposure modulation based on body size.   This report was finalized on 11/28/2024 9:33 PM by Dr. Anel Recinos M.D on Workstation: [email or web address][email or web address]      XR Chest 1 View    Result Date: 11/28/2024  No acute findings.  This report was finalized on 11/28/2024 8:43 PM by Dr. Anel Recinos M.D on Workstation: [id number]       Ambulatory status:   - Up [place]      Social issues:   Social History     Socioeconomic History    Marital status:    Tobacco Use    Smoking status: Never    Smokeless tobacco: Former     Types: Chew   Substance and Sexual Activity    Alcohol use: Yes     Comment: A couple glasses of bourbon /mo    Drug use: No    Sexual activity: Yes     Partners: Female     Birth control/protection: Surgical       Peripheral Neurovascular  Peripheral Neurovascular (Adult)  Peripheral Neurovascular WDL: WDL    Neuro Cognitive  Neuro Cognitive (Adult)  Cognitive/Neuro/Behavioral WDL: WDL    Learning  Learning Assessment  Learning Readiness and Ability: no barriers identified    Respiratory  Respiratory  Airway WDL: WDL  Respiratory WDL  Respiratory WDL: WDL    Abdominal Pain       Pain Assessments  Pain (Adult)  (0-10) Pain Rating: Rest: 1  (0-10) Pain Rating: Activity: 1  Response to Pain Interventions: cognitive function improved, respiratory function improved, functional ability unchanged, interventions effective per patient    NIH Stroke Scale       Alessandra Lopes RN  11/28/24 23:26 EST

## 2024-11-29 NOTE — PLAN OF CARE
Goal Outcome Evaluation:  Plan of Care Reviewed With: patient        Progress: no change  Outcome Evaluation: Medicated for pain as needed. Ecouraged Po fluids. Voids per urinal. Urine clearing nicely. Appetite is fair. Urology consulted and orders given. IV antibiotics continue. Blood sugars stable. WCTM.

## 2024-11-29 NOTE — PROGRESS NOTES
[facility] Clinical Pharmacy Services: Diflucan Consult    Pt Name: Maurizio Dejesus   : 1965  Weight: 92.3 kg (203 lb 6.4 oz)  Antimicrobial: Diflucan  Indication: Urinary Tract Infection    Relevant clinical data and objective history reviewed:    Past Medical History:   Diagnosis Date    Acute UTI (urinary tract infection) 2024    Diabetes mellitus     Elevated PSA     Erectile dysfunction     Hormone disorder     i've been taking testosterone supplements    Kidney stone     Kidney stone 2022     Creatinine   Date Value Ref Range Status   2024 1.00 0.76 - 1.27 mg/dL Final   2024 1.31 (H) 0.76 - 1.27 mg/dL Final   2024 1.43 (H) 0.76 - 1.27 mg/dL Final     BUN   Date Value Ref Range Status   2024 22 (H) 6 - 20 mg/dL Final     Estimated Creatinine Clearance: 103.8 mL/min (by C-G formula based on SCr of 1 mg/dL).    Lab Results   Component Value Date    WBC 9.72 2024     Temp Readings from Last 3 Encounters:   24 97.9 °F (36.6 °C) (Oral)   24 97.3 °F (36.3 °C) (Oral)   23 98.2 °F (36.8 °C)      Assessment/Plan    Ordered fluconazole 200 mg PO daily for a total of 14 days. Will monitor and adjust if culture data or pertinent lab values indicate this is best for the patient.    Thank you for this consult and please contact pharmacy with any questions or concerns.     Karli Live, PharmD  Clinical Pharmacist

## 2024-11-29 NOTE — PLAN OF CARE
Problem: Adult Inpatient Plan of Care  Goal: Plan of Care Review  Outcome: Progressing  Flowsheets (Taken 11/29/2024 0706)  Progress: no change  Outcome Evaluation: Fever and pain is well managed, falls prevention protocol maintained. Ceftriaxone started for pyelonephritis and UTI  Plan of Care Reviewed With:   patient   spouse   Goal Outcome Evaluation:  Plan of Care Reviewed With: patient, spouse        Progress: no change  Outcome Evaluation: Fever and pain is well managed, falls prevention protocol maintained. Ceftriaxone started for pyelonephritis and UTI

## 2024-11-29 NOTE — ED NOTES
"Pt to Room 03 with complaint of fever starting today.  Pt is one day post urerter stent placement on the left side.  States he has been on Keflex.  Per family, patient states he is having bouts on confusion.  Warm to touch.  No further complaints other than \"Not feeling great.\"  Pt has POC Accucheck at home of 400+.  States that he only checks his sugar once a day.    "

## 2024-11-29 NOTE — PROGRESS NOTES
Discharge Planning Assessment  [facility]     Patient Name: Maurizio Dejesus  MRN: [id number]  Today's Date: 11/29/2024    Admit Date: 11/28/2024    Plan: Return home with spouse   Discharge Needs Assessment       Row Name 11/29/24 1028       Living Environment    People in Home spouse    Name(s) of People in Home Stacey Britt    Current Living Arrangements home    Potentially Unsafe Housing Conditions none    Primary Care Provided by self    Provides Primary Care For no one    Family Caregiver if Needed spouse    Family Caregiver Names Stacey Britt [phone]    Quality of Family Relationships helpful    Able to Return to Prior Arrangements yes       Resource/Environmental Concerns    Resource/Environmental Concerns none    Transportation Concerns none       Transition Planning    Patient/Family Anticipates Transition to home with family    Patient/Family Anticipated Services at Transition none    Transportation Anticipated family or friend will provide       Discharge Needs Assessment    Readmission Within the Last 30 Days no previous admission in last 30 days    Equipment Currently Used at Home cpap    Concerns to be Addressed denies needs/concerns at this time    Anticipated Changes Related to Illness none    Equipment Needed After Discharge none                   Discharge Plan       Row Name 11/29/24 102       Plan    Plan Return home with spouse    Patient/Family in Agreement with Plan yes    Plan Comments Spoke with patient and son Heath [phone] at bedside.  Patient lives with wife Lorenza [phone], is IADL, uses a C-pap from [facility], has never used HH or been to SNF.  PCP is Dr. Ta Estevez and [facility].  Family will assist if needed.  He plans to return home at [place].  CCP will follow.  Dora GAMEZ                  Continued Care and Services - Admitted Since 11/28/2024    No active coordination exists for this encounter.       Expected Discharge Date and Time       Expected  Discharge Date Expected Discharge Time    Dec 1, 2024            Demographic Summary       Row Name 11/29/24 1028       General Information    Admission Type inpatient    Arrived From home    Referral Source admission list    Reason for Consult discharge planning    Preferred Language English                   Functional Status       Row Name 11/29/24 1028       Functional Status    Usual Activity Tolerance good    Current Activity Tolerance moderate       Functional Status, IADL    Medications independent    Meal Preparation independent;assistive person  Wife    Housekeeping assistive person;independent    Laundry independent;assistive person    Shopping assistive person;independent       Mental Status    General Appearance WDL WDL       Mental Status Summary    Recent Changes in Mental Status/Cognitive Functioning no changes                               Becky S. [place], RN

## 2024-11-29 NOTE — CONSULTS
FIRST UROLOGY CONSULT      Patient Identification:  NAME:  Maurizio Dejesus  Age:  59 y.o.   Sex:  male   :  1965   MRN:  [id number]     Chief complaint: nephrolithiasis     History of present illness:      Pt is a 59 y.o. male with a history of kidney stones who presented to the ED on 2024 with c/o fever and chills. Patient underwent cystoscopy with right ureteroscopy, laser lithotripsy, stone basket extraction and stent exchange with Dr. Diaz on 2024. Patient has a history of bilateral renal stones having undergone multiple procedures for treatment. He underwent a right ESWL with stent placement on 2024 by Dr. Diaz. Previously, he underwent a left ureteroscopy with laser lithotripsy and stent by Dr. Chamberlain on 2024. His stent was removed per cystoscopy on 10/4/2024.   Patient denies nausea, vomiting or difficulty with urination. Patient reports intermittent right flank pain.   Patient was admitted for further evaluation and care. He has been initiated on antibiotics. Urology was consulted to see this gentleman for further evaluation due to UTI with recent kidney stone intervention.   Patient's family is at bedside. His son is a neurosurgery resident at [facility] and assisted with patient history.     In hospital:  -AVSS, good UOP  -WBC - 9.72 (8.40)  -Creat - 1.00 (1.31)  -UA - Glucose: >1000, Blood 3+, Nitrite +, Protein: 100, RBC: TNTC, WBC: 6-10, Yeast: Present  -UCx - pending    -CT with contrast on 2024  - The patient has undergone placement of a double-J ureteral stent on the  right, as well as right-sided lithotripsy. There is no hydronephrosis,  but the patient does have perinephric stranding and heterogeneous  enhancement of the right kidney, concerning for pyelonephritis.        Past medical history:  Past Medical History:   Diagnosis Date    Acute UTI (urinary tract infection) 2024    Diabetes mellitus     Elevated PSA     Erectile dysfunction     Hormone  disorder     i've been taking testosterone supplements    Kidney stone     Kidney stone 08/23/2022       Past surgical history:  Past Surgical History:   Procedure Laterality Date    CYSTOSCOPY W/ URETERAL STENT PLACEMENT Left 08/30/2024    Procedure: CYSTOSCOPY URETERAL CATHETER/STENT INSERTION;  Surgeon: Ayush Waldron MD;  Location: [facility];  Service: Urology;  Laterality: Left;    EXTRACORPOREAL SHOCK WAVE LITHOTRIPSY (ESWL) Left 08/30/2024    Procedure: EXTRACORPOREAL SHOCKWAVE LITHOTRIPSY;  Surgeon: Ayush Waldron MD;  Location: [facility];  Service: Urology;  Laterality: Left;    PROSTATE ULTRASOUND BIOPSY  04/2017    negative    VASECTOMY         Allergies:  Patient has no known allergies.    Home medications:  Medications Prior to Admission   Medication Sig Dispense Refill Last Dose/Taking    amphetamine-dextroamphetamine XR (Adderall XR) 30 MG 24 hr capsule Take 1 capsule by mouth Every Morning ADHD (Patient taking differently: Take 20 mg by mouth Every Morning ADHD) 30 capsule 0 11/28/2024 Morning    empagliflozin (JARDIANCE) 25 MG tablet tablet Take 1 tablet by mouth Daily. 90 tablet 1 11/28/2024 Morning    metFORMIN ER (GLUCOPHAGE-XR) 500 MG 24 hr tablet TAKE TWO TABLETS BY MOUTH TWICE DAILY 120 tablet 1 11/28/2024 Evening    nabumetone (RELAFEN) 750 MG tablet TAKE ONE TABLET BY MOUTH TWICE DAILY 180 tablet 1 11/28/2024 Morning    oxyCODONE-acetaminophen (PERCOCET) 7.5-325 MG per tablet Take 1 tablet by mouth Every 6 (Six) Hours As Needed for Moderate Pain.   11/28/2024 Noon    rosuvastatin (CRESTOR) 20 MG tablet Take 1 tablet by mouth Daily. 90 tablet 1 11/28/2024 Morning    SITagliptin (JANUVIA) 100 MG tablet Take 1 tablet by mouth Daily. 90 tablet 3 11/28/2024 Morning    glucose blood test strip Test blood sugar once daily 100 each 12     loratadine-pseudoephedrine (Claritin-D 24 Hour)  MG per 24 hr tablet Claritin-D 24 Hour 10 mg-240 mg tablet,extended release   Take 1 tablet  every day by oral route as needed for 90 days. (Patient not taking: Reported on 2024)   Not Taking    sildenafil (VIAGRA) 100 MG tablet Take 1 tablet by mouth As Needed for Erectile Dysfunction. 18 tablet 13         Hospital medications:  cefTRIAXone, 1,000 mg, Intravenous, Q24H  insulin lispro, 2-7 Units, Subcutaneous, 4x Daily AC & at Bedtime  rosuvastatin, 20 mg, Oral, Daily      sodium chloride, 100 mL/hr, Last Rate: 100 mL/hr (24 0339)        acetaminophen    senna-docusate sodium **AND** polyethylene glycol **AND** bisacodyl **AND** bisacodyl    dextrose    dextrose    glucagon (human recombinant)    nitroglycerin    ondansetron ODT **OR** ondansetron    oxyCODONE-acetaminophen    Family history:  Family History   Problem Relation Age of Onset    No Known Problems Father     No Known Problems Mother        Social history:  Social History     Tobacco Use    Smoking status: Never    Smokeless tobacco: Former     Types: Chew   Substance Use Topics    Alcohol use: Not Currently     Comment: A couple glasses of bourbon /mo    Drug use: No       Review of systems:      12 point negative except as in HPI    Objective:  TMax 24 hours:   Temp (24hrs), Av.9 °F (37.7 °C), Min:97.7 °F (36.5 °C), Max:103.1 °F (39.5 °C)      Vitals Ranges:   Temp:  [97.7 °F (36.5 °C)-103.1 °F (39.5 °C)] 97.9 °F (36.6 °C)  Heart Rate:  [] 86  Resp:  [18-20] 18  BP: (110-168)/(56-81) 139/77    Intake/Output Last 3 shifts:  I/O last 3 completed shifts:  In: -   Out: 500 [Urine:500]     Physical Exam:    General Appearance:    Alert, cooperative, NAD   Back:     No CVA tenderness   Lungs:     Respirations unlabored, no wheezing   Abdomen:     Soft, NDNT, no palpable bladder distension, nontender   :    Deferred   Neuro/Psych:   Orientation intact, mood/affect pleasant       Results review:   I reviewed the patient's new clinical results.    Data review:  Lab Results (last 24 hours)       Procedure Component Value Units  Date/Time    POC Glucose Once [[id number]]  (Abnormal) Collected: 11/29/24 1115    Specimen: Blood Updated: 11/29/24 1117     Glucose 183 mg/dL     Urine Culture - Urine, Urine, Clean Catch [[id number]]  (Normal) Collected: 11/28/24 2016    Specimen: Urine, Clean Catch Updated: 11/29/24 1021     Urine Culture No growth    Basic Metabolic Panel [[id number]]  (Abnormal) Collected: 11/29/24 0542    Specimen: Blood Updated: 11/29/24 0702     Glucose 135 mg/dL      BUN 22 mg/dL      Creatinine 1.00 mg/dL      Sodium 138 mmol/L      Potassium 3.7 mmol/L      Chloride 106 mmol/L      CO2 21.0 mmol/L      Calcium 8.2 mg/dL      BUN/Creatinine Ratio 22.0     Anion Gap 11.0 mmol/L      eGFR 86.7 mL/min/1.73     Narrative:      GFR Normal >60  Chronic Kidney Disease <60  Kidney Failure <15      POC Glucose Once [[id number]]  (Normal) Collected: 11/29/24 0658    Specimen: Blood Updated: 11/29/24 0700     Glucose 116 mg/dL     Hemoglobin A1c [[id number]]  (Abnormal) Collected: 11/29/24 0542    Specimen: Blood Updated: 11/29/24 0648     Hemoglobin A1C 8.60 %     Narrative:      Hemoglobin A1C Ranges:    Increased Risk for Diabetes  5.7% to 6.4%  Diabetes                     >= 6.5%  Diabetic Goal                < 7.0%    CBC (No Diff) [[id number]]  (Abnormal) Collected: 11/29/24 0542    Specimen: Blood Updated: 11/29/24 0641     WBC 9.72 10*3/mm3      RBC 4.42 10*6/mm3      Hemoglobin 13.3 g/dL      Hematocrit 38.6 %      MCV 87.3 fL      MCH 30.1 pg      MCHC 34.5 g/dL      RDW 12.2 %      RDW-SD 38.8 fl      MPV 9.6 fL      Platelets 166 10*3/mm3     STAT Lactic Acid, Reflex [[id number]]  (Normal) Collected: 11/28/24 2255    Specimen: Blood Updated: 11/28/24 2315     Lactate 1.0 mmol/L     Ketone Bodies, Serum (Not performed at [facility]) [[id number]]  (Normal) Collected: 11/28/24 1952    Specimen: Blood Updated: 11/28/24 4265    Narrative:      The following orders were created for panel order Ketone Bodies, Serum (Not performed at  "[facility].  Procedure                               Abnormality         Status                     ---------                               -----------         ------                     Beta Hydroxybutyrate Reilly...[[id number]]  Normal              Final result                 Please view results for these tests on the individual orders.    Beta Hydroxybutyrate Quantitative [[id number]]  (Normal) Collected: 11/28/24 1952    Specimen: Blood Updated: 11/28/24 2309     Beta-Hydroxybutyrate Quant 0.155 mmol/L     Narrative:      In the assessment of possible diabetic ketoacidosis, the test should be interpreted along with other clinical and laboratory findings.  A level greater than 1 mmol/L should require further evaluation and levels of more than 3 mmol/L require immediate medical review.    POC Glucose Once [[id number]]  (Abnormal) Collected: 11/28/24 2222    Specimen: Blood Updated: 11/28/24 2233     Glucose 177 mg/dL     Procalcitonin [[id number]]  (Abnormal) Collected: 11/28/24 1952    Specimen: Blood Updated: 11/28/24 2233     Procalcitonin 0.75 ng/mL     Narrative:      As a Marker for Sepsis (Non-Neonates):    1. <0.5 ng/mL represents a low risk of severe sepsis and/or septic shock.  2. >2 ng/mL represents a high risk of severe sepsis and/or septic shock.    As a Marker for Lower Respiratory Tract Infections that require antibiotic therapy:    PCT on Admission    Antibiotic Therapy       6-12 Hrs later    >0.5                Strongly Recommended  >0.25 - <0.5        Recommended   0.1 - 0.25          Discouraged              Remeasure/reassess PCT  <0.1                Strongly Discouraged     Remeasure/reassess PCT    As 28 day mortality risk marker: \"Change in Procalcitonin Result\" (>80% or <=80%) if Day 0 (or Day 1) and Day 4 values are available. Refer to http://www.[employer]-pct-calculator.com    Change in PCT <=80%  A decrease of PCT levels below or equal to 80% defines a positive change in PCT test result " representing a higher risk for 28-day all-cause mortality of patients diagnosed with severe sepsis for septic shock.    Change in PCT >80%  A decrease of PCT levels of more than 80% defines a negative change in PCT result representing a lower risk for 28-day all-cause mortality of patients diagnosed with severe sepsis or septic shock.       C-reactive Protein [[id number]]  (Abnormal) Collected: 11/28/24 1952    Specimen: Blood Updated: 11/28/24 2225     C-Reactive Protein 10.56 mg/dL     Urinalysis, Microscopic Only - Urine, Clean Catch [[id number]]  (Abnormal) Collected: 11/28/24 2016    Specimen: Urine, Clean Catch Updated: 11/28/24 2159     RBC, UA Too Numerous to Count /HPF      WBC, UA 6-10 /HPF      Bacteria, UA None Seen /HPF      Squamous Epithelial Cells, UA 0-2 /HPF      Yeast, UA Present /HPF      Hyaline Casts, UA 3-6 /LPF      Methodology Automated Microscopy    [place] Urine Culture Tube - Urine, Clean Catch [[id number]] Collected: 11/28/24 2016    Specimen: Urine, Clean Catch Updated: 11/28/24 2149     Extra Tube Hold for add-ons.     Comment: Auto resulted.       Blood Gas, Venous - [[id number]]  (Abnormal) Collected: 11/28/24 2126    Specimen: Venous Blood Updated: 11/28/24 2128     pH, Venous 7.403 pH Units      pCO2, Venous 36.3 mm Hg      pO2, Venous 44.8 mm Hg      HCO3, Venous 22.6 mmol/L      Base Excess, Venous -1.7 mmol/L      Comment: Serial Number: [id number]:  [id number]        O2 Saturation, Venous 81.0 %      CO2 Content 23.7 mmol/L      Barometric Pressure for Blood Gas 745.8000 mmHg      Modality Room Air    Magnesium [[id number]]  (Normal) Collected: 11/28/24 1953    Specimen: Blood Updated: 11/28/24 2027     Magnesium 2.1 mg/dL     Comprehensive Metabolic Panel [[id number]]  (Abnormal) Collected: 11/28/24 1953    Specimen: Blood Updated: 11/28/24 2027     Glucose 415 mg/dL      BUN 31 mg/dL      Creatinine 1.31 mg/dL      Sodium 133 mmol/L      Potassium 4.0 mmol/L      Chloride 98 mmol/L       CO2 24.7 mmol/L      Calcium 9.2 mg/dL      Total Protein 6.9 g/dL      Albumin 4.0 g/dL      ALT (SGPT) 17 U/L      AST (SGOT) 16 U/L      Alkaline Phosphatase 80 U/L      Total Bilirubin 0.8 mg/dL      Globulin 2.9 gm/dL      A/G Ratio 1.4 g/dL      BUN/Creatinine Ratio 23.7     Anion Gap 10.3 mmol/L      eGFR 62.7 mL/min/1.73     Narrative:      GFR Normal >60  Chronic Kidney Disease <60  Kidney Failure <15      COVID-19 and FLU A/B PCR, 1 HR TAT - Swab, Nasopharynx [[id number]]  (Normal) Collected: 11/28/24 2002    Specimen: Swab from Nasopharynx Updated: 11/28/24 2027     COVID19 Not Detected     Influenza A PCR Not Detected     Influenza B PCR Not Detected    Narrative:      Fact sheet for providers: https://www.fda.gov/media/196879/download    Fact sheet for patients: https://www.fda.gov/media/430021/download    Test performed by PCR.    Lipase [[id number]]  (Normal) Collected: 11/28/24 1953    Specimen: Blood Updated: 11/28/24 2025     Lipase 25 U/L     Rapid Strep A Screen - Swab, Throat [[id number]]  (Normal) Collected: 11/28/24 2002    Specimen: Swab from Throat Updated: 11/28/24 2023     STREP A PCR Not Detected    Lactic Acid, Plasma [[id number]]  (Abnormal) Collected: 11/28/24 1953    Specimen: Blood Updated: 11/28/24 2022     Lactate 2.1 mmol/L     Urinalysis With Culture If Indicated - Urine, Clean Catch [[id number]]  (Abnormal) Collected: 11/28/24 2016    Specimen: Urine, Clean Catch Updated: 11/28/24 2021     Color, UA Bryan     Appearance, UA Clear     pH, UA <=5.0     Specific Gravity, UA <=1.005     Glucose, UA >=1000 mg/dL (3+)     Ketones, UA Negative     Bilirubin, UA Negative     Blood, UA Large (3+)     Protein,  mg/dL (2+)     Leuk Esterase, UA Negative     Nitrite, UA Positive     Urobilinogen, UA 0.2 E.U./dL    Narrative:      In absence of clinical symptoms, the presence of pyuria, bacteria, and/or nitrites on the urinalysis result does not correlate with infection.    [place]  Draw [[id number]] Collected: 11/28/24 1952    Specimen: Blood Updated: 11/28/24 2015    Narrative:      The following orders were created for panel order [place] Draw.  Procedure                               Abnormality         Status                     ---------                               -----------         ------                     Gold Top - SST[[id number]]                                   Final result               Light Blue Top[[id number]]                                   Final result                 Please view results for these tests on the individual orders.    [facility] - SST [[id number]] Collected: 11/28/24 1952    Specimen: Blood Updated: 11/28/24 2015     Extra Tube Hold for add-ons.     Comment: Auto resulted.       Light Blue Top [[id number]] Collected: 11/28/24 1952    Specimen: Blood Updated: 11/28/24 2015     Extra Tube Hold for add-ons.     Comment: Auto resulted       Blood Culture - Blood, Arm, Left [[id number]] Collected: 11/28/24 2003    Specimen: Blood from Arm, Left Updated: 11/28/24 2006    Blood Culture - Blood, Arm, Right [[id number]] Collected: 11/28/24 2003    Specimen: Blood from Arm, Right Updated: 11/28/24 2006    CBC & Differential [[id number]]  (Abnormal) Collected: 11/28/24 1953    Specimen: Blood Updated: 11/28/24 2001    Narrative:      The following orders were created for panel order CBC & Differential.  Procedure                               Abnormality         Status                     ---------                               -----------         ------                     CBC Auto Differential[[id number]]        Abnormal            Final result                 Please view results for these tests on the individual orders.    CBC Auto Differential [[id number]]  (Abnormal) Collected: 11/28/24 1953    Specimen: Blood Updated: 11/28/24 2001     WBC 8.40 10*3/mm3      RBC 5.08 10*6/mm3      Hemoglobin 15.2 g/dL      Hematocrit 45.0 %      MCV 88.6 fL      MCH 29.9 pg      MCHC 33.8  g/dL      RDW 12.3 %      RDW-SD 40.5 fl      MPV 9.5 fL      Platelets 165 10*3/mm3      Neutrophil % 75.9 %      Lymphocyte % 8.3 %      Monocyte % 9.9 %      Eosinophil % 5.6 %      Basophil % 0.2 %      Immature Grans % 0.1 %      Neutrophils, Absolute 6.37 10*3/mm3      Lymphocytes, Absolute 0.70 10*3/mm3      Monocytes, Absolute 0.83 10*3/mm3      Eosinophils, Absolute 0.47 10*3/mm3      Basophils, Absolute 0.02 10*3/mm3      Immature Grans, Absolute 0.01 10*3/mm3              Imaging:  Imaging Results (Last 24 Hours)       Procedure Component Value Units Date/Time    CT Abdomen Pelvis With Contrast [[id number]] Collected: 11/28/24 2118     Updated: 11/28/24 2136    Narrative:      CT OF THE ABDOMEN PELVIS WITH CONTRAST     HISTORY: Sepsis     COMPARISON: August 30, 2024     TECHNIQUE: Axial CT imaging was obtained through the abdomen and pelvis.  IV contrast was administered.     FINDINGS:  Images through the lung bases demonstrate dependent atelectasis. A few  tiny cysts are noted within the liver. There is a small hiatal hernia.  The duodenum, adrenal glands, and spleen are normal. There is some  minimal pancreatic atrophy. Gallbladder is normal. The patient has a  double-J ureteral stent noted on the right. No hydronephrosis is seen.  There is some perinephric stranding which is noted on the right, as well  as heterogeneous enhancement of the right kidney, suggesting  pyelonephritis. There is a stable 5 mm nonobstructing stone within the  right kidney. A previously noted small staghorn calculus within the  right kidney is no longer identified. The patient does have a small  stone fragment within the urinary bladder. No stones are noted on the  left. There is no hydronephrosis on the left. There is air within the  urinary bladder, in keeping with recent procedure. The prostate gland is  enlarged and contains dystrophic calcifications. There is no bowel  obstruction. There is colonic diverticulosis. The  appendix is normal. No  acute osseous abnormalities are seen. There are small bilateral  fat-containing inguinal hernias.       Impression:      The patient has undergone placement of a double-J ureteral stent on the  right, as well as right-sided lithotripsy. There is no hydronephrosis,  but the patient does have perinephric stranding and heterogeneous  enhancement of the right kidney, concerning for pyelonephritis.     Radiation dose reduction techniques were utilized, including automated  exposure control and exposure modulation based on body size.        This report was finalized on 11/28/2024 9:33 PM by Dr. Anel Recinos M.D on Workstation: [id number]       XR Chest 1 View [[id number]] Collected: 11/28/24 2043     Updated: 11/28/24 2046    Narrative:      SINGLE VIEW OF THE CHEST     HISTORY: Fever     COMPARISON: None available.     FINDINGS:  Heart size is within normal limits. No pneumothorax, pleural effusion,  or acute infiltrate is seen.       Impression:      No acute findings.     This report was finalized on 11/28/2024 8:43 PM by Dr. Anel Recinos M.D on Workstation: [id number]                Assessment     Right kidney stone  UTI  3.   Right flank pain      Plan:     - No urgent urologic intervention.  - Culture with NGTD. Not surprising given patient's several week course of keflex prior to presentation. Continue antibiotics per primary service. Recommend discharge with prophylactic course of antibiotics. Consult pharmacy for diflucan dosing with UA showing yeast.   - Start Tamsulosin 0.4 mg QD  - Will add Pyridium and Robaxin for stent colic.   - Regular diet  - Advised copious fluid intake  - Return to ER emergently if fever or signs of infection develop.  - Will arrange to reschedule his cystoscopy with stent removal with Dr. Diaz and have our office contact the patient.    - Call for any questions, concerns, or changes in patient's condition     Brenda Spencer,  NITHIN  11/29/24  13:01 EST    Plan reviewed and discussed with Dr. Chamberlain.

## 2024-11-29 NOTE — H&P
Patient Name:  Maurizio Dejesus  YOB: 1965  MRN:  [id number]  Admit Date:  11/28/2024  Patient Care Team:  Ta Estevez MD as PCP - General (Internal Medicine)  Ranjit Washington MD as Consulting Physician (Urology)      Subjective   History Present Illness     Chief Complaint   Patient presents with    Hyperglycemia     History of Present Illness    Mr. Dejesus 59-year-old male with a past medical history of type 2 diabetes, kidney stone, erectile dysfunction, and obstructive sleep apnea presents to [facility] ED with complaints of hyperglycemia.  Patient reports lithotripsy and ureteroscopy with a right sided stent placement on 11/27/2024.  Patient noted fever of 102.9 on arrival to the ED.  Patient noted to be having chills and not able to get comfortable in the bed.  Denies shortness of breath, chest pain, nausea, vomiting, diarrhea, or difficulty urinating.  He does report some right lower quadrant pain, and family member reports that it has been a significant amount of time since he has had any pain medicine.    Review of Systems   Constitutional:  Positive for chills and fever.   Respiratory:  Negative for shortness of breath.    Cardiovascular:  Negative for chest pain.   Gastrointestinal:  Positive for abdominal pain. Negative for diarrhea, nausea and vomiting.   Genitourinary:  Negative for difficulty urinating.        Personal History     Past Medical History:   Diagnosis Date    Acute UTI (urinary tract infection) 11/29/2024    Diabetes mellitus     Elevated PSA     Erectile dysfunction     Hormone disorder     i've been taking testosterone supplements    Kidney stone     Kidney stone 08/23/2022     Past Surgical History:   Procedure Laterality Date    CYSTOSCOPY W/ URETERAL STENT PLACEMENT Left 08/30/2024    Procedure: CYSTOSCOPY URETERAL CATHETER/STENT INSERTION;  Surgeon: Ayush Waldron MD;  Location: [facility];  Service: Urology;  Laterality: Left;     EXTRACORPOREAL SHOCK WAVE LITHOTRIPSY (ESWL) Left 08/30/2024    Procedure: EXTRACORPOREAL SHOCKWAVE LITHOTRIPSY;  Surgeon: Ayush Waldron MD;  Location: [facility];  Service: Urology;  Laterality: Left;    PROSTATE ULTRASOUND BIOPSY  04/2017    negative    VASECTOMY       Family History   Problem Relation Age of Onset    No Known Problems Father     No Known Problems Mother      Social History     Tobacco Use    Smoking status: Never    Smokeless tobacco: Former     Types: Chew   Substance Use Topics    Alcohol use: Not Currently     Comment: A couple glasses of bourbon /mo    Drug use: No     No current facility-administered medications on file prior to encounter.     Current Outpatient Medications on File Prior to Encounter   Medication Sig Dispense Refill    amphetamine-dextroamphetamine XR (Adderall XR) 30 MG 24 hr capsule Take 1 capsule by mouth Every Morning ADHD (Patient taking differently: Take 20 mg by mouth Every Morning ADHD) 30 capsule 0    empagliflozin (JARDIANCE) 25 MG tablet tablet Take 1 tablet by mouth Daily. 90 tablet 1    metFORMIN ER (GLUCOPHAGE-XR) 500 MG 24 hr tablet TAKE TWO TABLETS BY MOUTH TWICE DAILY 120 tablet 1    nabumetone (RELAFEN) 750 MG tablet TAKE ONE TABLET BY MOUTH TWICE DAILY 180 tablet 1    oxyCODONE-acetaminophen (PERCOCET) 7.5-325 MG per tablet Take 1 tablet by mouth Every 6 (Six) Hours As Needed for Moderate Pain.      rosuvastatin (CRESTOR) 20 MG tablet Take 1 tablet by mouth Daily. 90 tablet 1    SITagliptin (JANUVIA) 100 MG tablet Take 1 tablet by mouth Daily. 90 tablet 3    glucose blood test strip Test blood sugar once daily 100 each 12    loratadine-pseudoephedrine (Claritin-D 24 Hour)  MG per 24 hr tablet Claritin-D 24 Hour 10 mg-240 mg tablet,extended release   Take 1 tablet every day by oral route as needed for 90 days. (Patient not taking: Reported on 11/29/2024)      sildenafil (VIAGRA) 100 MG tablet Take 1 tablet by mouth As Needed for Erectile  Dysfunction. 18 tablet 13     No Known Allergies    Objective    Objective     Vital Signs  Temp:  [98.2 °F (36.8 °C)-103.1 °F (39.5 °C)] 98.2 °F (36.8 °C)  Heart Rate:  [] 90  Resp:  [19-20] 19  BP: (110-168)/(56-81) 139/71  SpO2:  [90 %-95 %] 90 %  on   ;      Body mass index is 27.59 kg/m².    Physical Exam  Vitals and nursing note reviewed.   Constitutional:       General: He is not in acute distress.  Cardiovascular:      Rate and Rhythm: Normal rate and regular rhythm.      Heart sounds: Normal heart sounds.   Pulmonary:      Effort: Pulmonary effort is normal. No respiratory distress.      Breath sounds: Normal breath sounds.   Abdominal:      General: Bowel sounds are normal. There is no distension.      Palpations: Abdomen is soft.      Tenderness: There is no abdominal tenderness.   Musculoskeletal:         General: Normal range of motion.   Skin:     General: Skin is warm and dry.   Neurological:      Mental Status: He is alert.         Results Review:  I reviewed the patient's new clinical results.  I reviewed the patient's new imaging results.  I reviewed the patient's other test results.  Discussed with ED provider.    Lab Results (last 24 hours)       Procedure Component Value Units Date/Time    Procalcitonin [[id number]]  (Abnormal) Collected: 11/28/24 1952    Specimen: Blood Updated: 11/28/24 2233     Procalcitonin 0.75 ng/mL     Narrative:      As a Marker for Sepsis (Non-Neonates):    1. <0.5 ng/mL represents a low risk of severe sepsis and/or septic shock.  2. >2 ng/mL represents a high risk of severe sepsis and/or septic shock.    As a Marker for Lower Respiratory Tract Infections that require antibiotic therapy:    PCT on Admission    Antibiotic Therapy       6-12 Hrs later    >0.5                Strongly Recommended  >0.25 - <0.5        Recommended   0.1 - 0.25          Discouraged              Remeasure/reassess PCT  <0.1                Strongly Discouraged     Remeasure/reassess  "PCT    As 28 day mortality risk marker: \"Change in Procalcitonin Result\" (>80% or <=80%) if Day 0 (or Day 1) and Day 4 values are available. Refer to http://www.[facility]-pct-calculator.com    Change in PCT <=80%  A decrease of PCT levels below or equal to 80% defines a positive change in PCT test result representing a higher risk for 28-day all-cause mortality of patients diagnosed with severe sepsis for septic shock.    Change in PCT >80%  A decrease of PCT levels of more than 80% defines a negative change in PCT result representing a lower risk for 28-day all-cause mortality of patients diagnosed with severe sepsis or septic shock.       C-reactive Protein [[id number]]  (Abnormal) Collected: 11/28/24 1952    Specimen: Blood Updated: 11/28/24 2225     C-Reactive Protein 10.56 mg/dL     Ketone Bodies, Serum (Not performed at [facility]) [[id number]]  (Normal) Collected: 11/28/24 1952    Specimen: Blood Updated: 11/28/24 2309    Narrative:      The following orders were created for panel order Ketone Bodies, Serum (Not performed at [facility]).  Procedure                               Abnormality         Status                     ---------                               -----------         ------                     Beta Hydroxybutyrate Reilly...[[id number]]  Normal              Final result                 Please view results for these tests on the individual orders.    Beta Hydroxybutyrate Quantitative [[id number]]  (Normal) Collected: 11/28/24 1952    Specimen: Blood Updated: 11/28/24 2309     Beta-Hydroxybutyrate Quant 0.155 mmol/L     Narrative:      In the assessment of possible diabetic ketoacidosis, the test should be interpreted along with other clinical and laboratory findings.  A level greater than 1 mmol/L should require further evaluation and levels of more than 3 mmol/L require immediate medical review.    CBC & Differential [[id number]]  (Abnormal) Collected: 11/28/24 1953    Specimen: Blood Updated: 11/28/24 2001 "    Narrative:      The following orders were created for panel order CBC & Differential.  Procedure                               Abnormality         Status                     ---------                               -----------         ------                     CBC Auto Differential[[id number]]        Abnormal            Final result                 Please view results for these tests on the individual orders.    Comprehensive Metabolic Panel [[id number]]  (Abnormal) Collected: 11/28/24 1953    Specimen: Blood Updated: 11/28/24 2027     Glucose 415 mg/dL      BUN 31 mg/dL      Creatinine 1.31 mg/dL      Sodium 133 mmol/L      Potassium 4.0 mmol/L      Chloride 98 mmol/L      CO2 24.7 mmol/L      Calcium 9.2 mg/dL      Total Protein 6.9 g/dL      Albumin 4.0 g/dL      ALT (SGPT) 17 U/L      AST (SGOT) 16 U/L      Alkaline Phosphatase 80 U/L      Total Bilirubin 0.8 mg/dL      Globulin 2.9 gm/dL      A/G Ratio 1.4 g/dL      BUN/Creatinine Ratio 23.7     Anion Gap 10.3 mmol/L      eGFR 62.7 mL/min/1.73     Narrative:      GFR Normal >60  Chronic Kidney Disease <60  Kidney Failure <15      Lactic Acid, Plasma [[id number]]  (Abnormal) Collected: 11/28/24 1953    Specimen: Blood Updated: 11/28/24 2022     Lactate 2.1 mmol/L     CBC Auto Differential [[id number]]  (Abnormal) Collected: 11/28/24 1953    Specimen: Blood Updated: 11/28/24 2001     WBC 8.40 10*3/mm3      RBC 5.08 10*6/mm3      Hemoglobin 15.2 g/dL      Hematocrit 45.0 %      MCV 88.6 fL      MCH 29.9 pg      MCHC 33.8 g/dL      RDW 12.3 %      RDW-SD 40.5 fl      MPV 9.5 fL      Platelets 165 10*3/mm3      Neutrophil % 75.9 %      Lymphocyte % 8.3 %      Monocyte % 9.9 %      Eosinophil % 5.6 %      Basophil % 0.2 %      Immature Grans % 0.1 %      Neutrophils, Absolute 6.37 10*3/mm3      Lymphocytes, Absolute 0.70 10*3/mm3      Monocytes, Absolute 0.83 10*3/mm3      Eosinophils, Absolute 0.47 10*3/mm3      Basophils, Absolute 0.02 10*3/mm3      Immature Grans,  Absolute 0.01 10*3/mm3     Lipase [[id number]]  (Normal) Collected: 11/28/24 1953    Specimen: Blood Updated: 11/28/24 2025     Lipase 25 U/L     Magnesium [[id number]]  (Normal) Collected: 11/28/24 1953    Specimen: Blood Updated: 11/28/24 2027     Magnesium 2.1 mg/dL     Rapid Strep A Screen - Swab, Throat [[id number]]  (Normal) Collected: 11/28/24 2002    Specimen: Swab from Throat Updated: 11/28/24 2023     STREP A PCR Not Detected    COVID-19 and FLU A/B PCR, 1 HR TAT - Swab, Nasopharynx [[id number]]  (Normal) Collected: 11/28/24 2002    Specimen: Swab from Nasopharynx Updated: 11/28/24 2027     COVID19 Not Detected     Influenza A PCR Not Detected     Influenza B PCR Not Detected    Narrative:      Fact sheet for providers: https://www.fda.gov/media/997600/download    Fact sheet for patients: https://www.fda.gov/media/021998/download    Test performed by PCR.    Blood Culture - Blood, Arm, Left [[id number]] Collected: 11/28/24 2003    Specimen: Blood from Arm, Left Updated: 11/28/24 2006    Blood Culture - Blood, Arm, Right [[id number]] Collected: 11/28/24 2003    Specimen: Blood from Arm, Right Updated: 11/28/24 2006    Urinalysis With Culture If Indicated - Urine, Clean Catch [[id number]]  (Abnormal) Collected: 11/28/24 2016    Specimen: Urine, Clean Catch Updated: 11/28/24 2021     Color, UA [place]     Appearance, UA Clear     pH, UA <=5.0     Specific Gravity, UA <=1.005     Glucose, UA >=1000 mg/dL (3+)     Ketones, UA Negative     Bilirubin, UA Negative     Blood, UA Large (3+)     Protein,  mg/dL (2+)     Leuk Esterase, UA Negative     Nitrite, UA Positive     Urobilinogen, UA 0.2 E.U./dL    Narrative:      In absence of clinical symptoms, the presence of pyuria, bacteria, and/or nitrites on the urinalysis result does not correlate with infection.    Urinalysis, Microscopic Only - Urine, Clean Catch [[id number]]  (Abnormal) Collected: 11/28/24 2016    Specimen: Urine, Clean Catch Updated: 11/28/24 2157      RBC, UA Too Numerous to Count /HPF      WBC, UA 6-10 /HPF      Bacteria, UA None Seen /HPF      Squamous Epithelial Cells, UA 0-2 /HPF      Yeast, UA Present /HPF      Hyaline Casts, UA 3-6 /LPF      Methodology Automated Microscopy    [place] Urine Culture Tube - Urine, Clean Catch [[id number]] Collected: 11/28/24 2016    Specimen: Urine, Clean Catch Updated: 11/28/24 2149     Extra Tube Hold for add-ons.     Comment: Auto resulted.       Urine Culture - Urine, Urine, Clean Catch [[id number]] Collected: 11/28/24 2016    Specimen: Urine, Clean Catch Updated: 11/28/24 2159    Blood Gas, Venous - [[id number]]  (Abnormal) Collected: 11/28/24 2126    Specimen: Venous Blood Updated: 11/28/24 2128     pH, Venous 7.403 pH Units      pCO2, Venous 36.3 mm Hg      pO2, Venous 44.8 mm Hg      HCO3, Venous 22.6 mmol/L      Base Excess, Venous -1.7 mmol/L      Comment: Serial Number: [id number]:  [id number]        O2 Saturation, Venous 81.0 %      CO2 Content 23.7 mmol/L      Barometric Pressure for Blood Gas 745.8000 mmHg      Modality Room Air    POC Glucose Once [[id number]]  (Abnormal) Collected: 11/28/24 2222    Specimen: Blood Updated: 11/28/24 2233     Glucose 177 mg/dL     STAT Lactic Acid, Reflex [[id number]]  (Normal) Collected: 11/28/24 2255    Specimen: Blood Updated: 11/28/24 2315     Lactate 1.0 mmol/L             Imaging Results (Last 24 Hours)       Procedure Component Value Units Date/Time    CT Abdomen Pelvis With Contrast [[id number]] Collected: 11/28/24 2118     Updated: 11/28/24 2136    Narrative:      CT OF THE ABDOMEN PELVIS WITH CONTRAST     HISTORY: Sepsis     COMPARISON: August 30, 2024     TECHNIQUE: Axial CT imaging was obtained through the abdomen and pelvis.  IV contrast was administered.     FINDINGS:  Images through the lung bases demonstrate dependent atelectasis. A few  tiny cysts are noted within the liver. There is a small hiatal hernia.  The duodenum, adrenal glands, and spleen are normal. There is  some  minimal pancreatic atrophy. Gallbladder is normal. The patient has a  double-J ureteral stent noted on the right. No hydronephrosis is seen.  There is some perinephric stranding which is noted on the right, as well  as heterogeneous enhancement of the right kidney, suggesting  pyelonephritis. There is a stable 5 mm nonobstructing stone within the  right kidney. A previously noted small staghorn calculus within the  right kidney is no longer identified. The patient does have a small  stone fragment within the urinary bladder. No stones are noted on the  left. There is no hydronephrosis on the left. There is air within the  urinary bladder, in keeping with recent procedure. The prostate gland is  enlarged and contains dystrophic calcifications. There is no bowel  obstruction. There is colonic diverticulosis. The appendix is normal. No  acute osseous abnormalities are seen. There are small bilateral  fat-containing inguinal hernias.       Impression:      The patient has undergone placement of a double-J ureteral stent on the  right, as well as right-sided lithotripsy. There is no hydronephrosis,  but the patient does have perinephric stranding and heterogeneous  enhancement of the right kidney, concerning for pyelonephritis.     Radiation dose reduction techniques were utilized, including automated  exposure control and exposure modulation based on body size.        This report was finalized on 11/28/2024 9:33 PM by Dr. Anel Recinos M.D on Workstation: [id number]       XR Chest 1 View [[id number]] Collected: 11/28/24 2043     Updated: 11/28/24 2046    Narrative:      SINGLE VIEW OF THE CHEST     HISTORY: Fever     COMPARISON: None available.     FINDINGS:  Heart size is within normal limits. No pneumothorax, pleural effusion,  or acute infiltrate is seen.       Impression:      No acute findings.     This report was finalized on 11/28/2024 8:43 PM by Dr. Anel Recinos M.D on Workstation:  [id number]                 No orders to display     Assessment/Plan   Assessment & Plan   Active Hospital Problems    Diagnosis  POA    **Sepsis [A41.9]  Yes    Acute UTI (urinary tract infection) [N39.0]  Yes    Type 2 diabetes mellitus with hyperglycemia, without long-term current use of insulin [E11.65]  Yes    BEAR (obstructive sleep apnea) [G47.33]  Yes      Resolved Hospital Problems   No resolved problems to display.     Mr. Dejesus 59-year-old male with a past medical history of type 2 diabetes, kidney stone, erectile dysfunction, and obstructive sleep apnea presents to [facility] ED with complaints of hyperglycemia.  Patient reports lithotripsy and ureteroscopy with a right sided stent placement on 11/27/2024.       Sepsis  Acute UTI  -Presenting with temperature 102.9 to the ED  -Status post 1 day postop outpatient lithotripsy  -Urinalysis positive for nitrites, large amount of blood  -Urine culture and blood cultures pending  -Initial lactic 2.1, repeat 1.0  C-reactive protein elevated at 10.56, and procalcitonin elevated at 0.75  -Received 2 L bolus in the ED, will continue IV fluids overnight  -Received ceftriaxone in the ED, will continue pending urine and blood culture  -Bladder scan  -Supportive care for pain control  -Telemetry monitoring  -Strict I&O    Type 2 diabetes mellitus   - Most recent A1c: 8.3 from 12/16/2022, will repeat  -Glucose 415 on arrival to ED, treated with 8 units of Humalog, repeat glucose 177  -Currently takes Jardiance, metformin, and Januvia at home, will hold  -Glucose checks before meals and at bedtime  -Correctional sliding scale insulin for hyperglycemia  -Healthy heart consistent carb diet     Sleep Apnea  -Continuous pulse oximetry  -May use home CPAP if available  -Supplemental oxygen as needed      SCDs for DVT prophylaxis.  Full code.  Discussed with patient and family.      NITHIN Warner  [facility]  11/29/24  04:02  EST

## 2024-11-29 NOTE — PROGRESS NOTES
"DAILY PROGRESS NOTE  [facility]    Patient Identification:  Name: Maurizio Dejesus  Age: 59 y.o.  Sex: male  :  1965  MRN: [id number]         Primary Care Physician: Ta Estevez MD    Subjective:  Interval History: He complains of some right-sided abdominal pain but is feeling much better today.    Objective:    Scheduled Meds:cefTRIAXone, 1,000 mg, Intravenous, Q24H  insulin lispro, 2-7 Units, Subcutaneous, 4x Daily AC & at Bedtime  rosuvastatin, 20 mg, Oral, Daily      Continuous Infusions:sodium chloride, 100 mL/hr, Last Rate: 100 mL/hr (24 0339)        Vital signs in last 24 hours:  Temp:  [97.7 °F (36.5 °C)-103.1 °F (39.5 °C)] 97.7 °F (36.5 °C)  Heart Rate:  [] 78  Resp:  [18-20] 18  BP: (110-168)/(56-81) 122/74    Intake/Output:    Intake/Output Summary (Last 24 hours) at 2024 1223  Last data filed at 2024 2342  Gross per 24 hour   Intake --   Output 500 ml   Net -500 ml       Exam:  /74 (BP Location: Left arm, Patient Position: Lying)   Pulse 78   Temp 97.7 °F (36.5 °C) (Oral)   Resp 18   Ht 182.9 cm (72\")   Wt 92.3 kg (203 lb 6.4 oz)   SpO2 96%   BMI 27.59 kg/m²     General Appearance:    Alert, cooperative, no distress   Head:    Normocephalic, without obvious abnormality, atraumatic   Eyes:       Throat:   Lips, tongue, gums normal   Neck:   Supple, symmetrical, trachea midline, no JVD   Lungs:     Clear to auscultation bilaterally, respirations unlabored   Chest Wall:    No tenderness or deformity    Heart:    Regular rate and rhythm, S1 and S2 normal, no murmur,no  rub or gallop   Abdomen:     Soft, nontender, bowel sounds active, no masses, no organomegaly    Extremities:   Extremities normal, atraumatic, no cyanosis or edema   Pulses:      Skin:   Skin is warm and dry,  no rashes or palpable lesions   Neurologic:   no focal deficits noted      Lab Results (last 72 hours)       Procedure Component Value Units Date/Time    POC Glucose " Once [[id number]]  (Abnormal) Collected: 11/29/24 1115    Specimen: Blood Updated: 11/29/24 1117     Glucose 183 mg/dL     Urine Culture - Urine, Urine, Clean Catch [[id number]]  (Normal) Collected: 11/28/24 2016    Specimen: Urine, Clean Catch Updated: 11/29/24 1021     Urine Culture No growth    Basic Metabolic Panel [[id number]]  (Abnormal) Collected: 11/29/24 0542    Specimen: Blood Updated: 11/29/24 0702     Glucose 135 mg/dL      BUN 22 mg/dL      Creatinine 1.00 mg/dL      Sodium 138 mmol/L      Potassium 3.7 mmol/L      Chloride 106 mmol/L      CO2 21.0 mmol/L      Calcium 8.2 mg/dL      BUN/Creatinine Ratio 22.0     Anion Gap 11.0 mmol/L      eGFR 86.7 mL/min/1.73     Narrative:      GFR Normal >60  Chronic Kidney Disease <60  Kidney Failure <15      POC Glucose Once [[id number]]  (Normal) Collected: 11/29/24 0658    Specimen: Blood Updated: 11/29/24 0700     Glucose 116 mg/dL     Hemoglobin A1c [[id number]]  (Abnormal) Collected: 11/29/24 0542    Specimen: Blood Updated: 11/29/24 0648     Hemoglobin A1C 8.60 %     Narrative:      Hemoglobin A1C Ranges:    Increased Risk for Diabetes  5.7% to 6.4%  Diabetes                     >= 6.5%  Diabetic Goal                < 7.0%    CBC (No Diff) [[id number]]  (Abnormal) Collected: 11/29/24 0542    Specimen: Blood Updated: 11/29/24 0641     WBC 9.72 10*3/mm3      RBC 4.42 10*6/mm3      Hemoglobin 13.3 g/dL      Hematocrit 38.6 %      MCV 87.3 fL      MCH 30.1 pg      MCHC 34.5 g/dL      RDW 12.2 %      RDW-SD 38.8 fl      MPV 9.6 fL      Platelets 166 10*3/mm3     STAT Lactic Acid, Reflex [[id number]]  (Normal) Collected: 11/28/24 2255    Specimen: Blood Updated: 11/28/24 2315     Lactate 1.0 mmol/L     Ketone Bodies, Serum (Not performed at [place]) [[id number]]  (Normal) Collected: 11/28/24 1952    Specimen: Blood Updated: 11/28/24 2309    Narrative:      The following orders were created for panel order Ketone Bodies, Serum (Not performed at [place]).  Procedure         "                       Abnormality         Status                     ---------                               -----------         ------                     Beta Hydroxybutyrate [place]...[[id number]]  Normal              Final result                 Please view results for these tests on the individual orders.    Beta Hydroxybutyrate Quantitative [[id number]]  (Normal) Collected: 11/28/24 1952    Specimen: Blood Updated: 11/28/24 2309     Beta-Hydroxybutyrate Quant 0.155 mmol/L     Narrative:      In the assessment of possible diabetic ketoacidosis, the test should be interpreted along with other clinical and laboratory findings.  A level greater than 1 mmol/L should require further evaluation and levels of more than 3 mmol/L require immediate medical review.    POC Glucose Once [[id number]]  (Abnormal) Collected: 11/28/24 2222    Specimen: Blood Updated: 11/28/24 2233     Glucose 177 mg/dL     Procalcitonin [[id number]]  (Abnormal) Collected: 11/28/24 1952    Specimen: Blood Updated: 11/28/24 2233     Procalcitonin 0.75 ng/mL     Narrative:      As a Marker for Sepsis (Non-Neonates):    1. <0.5 ng/mL represents a low risk of severe sepsis and/or septic shock.  2. >2 ng/mL represents a high risk of severe sepsis and/or septic shock.    As a Marker for Lower Respiratory Tract Infections that require antibiotic therapy:    PCT on Admission    Antibiotic Therapy       6-12 Hrs later    >0.5                Strongly Recommended  >0.25 - <0.5        Recommended   0.1 - 0.25          Discouraged              Remeasure/reassess PCT  <0.1                Strongly Discouraged     Remeasure/reassess PCT    As 28 day mortality risk marker: \"Change in Procalcitonin Result\" (>80% or <=80%) if Day 0 (or Day 1) and Day 4 values are available. Refer to http://www.[employer]-pct-calculator.com    Change in PCT <=80%  A decrease of PCT levels below or equal to 80% defines a positive change in PCT test result representing a higher risk for 28-day " all-cause mortality of patients diagnosed with severe sepsis for septic shock.    Change in PCT >80%  A decrease of PCT levels of more than 80% defines a negative change in PCT result representing a lower risk for 28-day all-cause mortality of patients diagnosed with severe sepsis or septic shock.       C-reactive Protein [[id number]]  (Abnormal) Collected: 11/28/24 1952    Specimen: Blood Updated: 11/28/24 2225     C-Reactive Protein 10.56 mg/dL     Urinalysis, Microscopic Only - Urine, Clean Catch [[id number]]  (Abnormal) Collected: 11/28/24 2016    Specimen: Urine, Clean Catch Updated: 11/28/24 2159     RBC, UA Too Numerous to Count /HPF      WBC, UA 6-10 /HPF      Bacteria, UA None Seen /HPF      Squamous Epithelial Cells, UA 0-2 /HPF      Yeast, UA Present /HPF      Hyaline Casts, UA 3-6 /LPF      Methodology Automated Microscopy    [place] Urine Culture Tube - Urine, Clean Catch [[id number]] Collected: 11/28/24 2016    Specimen: Urine, Clean Catch Updated: 11/28/24 2149     Extra Tube Hold for add-ons.     Comment: Auto resulted.       Blood Gas, Venous - [[id number]]  (Abnormal) Collected: 11/28/24 2126    Specimen: Venous Blood Updated: 11/28/24 2128     pH, Venous 7.403 pH Units      pCO2, Venous 36.3 mm Hg      pO2, Venous 44.8 mm Hg      HCO3, Venous 22.6 mmol/L      Base Excess, Venous -1.7 mmol/L      Comment: Serial Number: [id number]  [id number]        O2 Saturation, Venous 81.0 %      CO2 Content 23.7 mmol/L      Barometric Pressure for Blood Gas 745.8000 mmHg      Modality Room Air    Magnesium [[id number]]  (Normal) Collected: 11/28/24 1953    Specimen: Blood Updated: 11/28/24 2027     Magnesium 2.1 mg/dL     Comprehensive Metabolic Panel [[id number]]  (Abnormal) Collected: 11/28/24 1953    Specimen: Blood Updated: 11/28/24 2027     Glucose 415 mg/dL      BUN 31 mg/dL      Creatinine 1.31 mg/dL      Sodium 133 mmol/L      Potassium 4.0 mmol/L      Chloride 98 mmol/L      CO2 24.7 mmol/L      Calcium 9.2  mg/dL      Total Protein 6.9 g/dL      Albumin 4.0 g/dL      ALT (SGPT) 17 U/L      AST (SGOT) 16 U/L      Alkaline Phosphatase 80 U/L      Total Bilirubin 0.8 mg/dL      Globulin 2.9 gm/dL      A/G Ratio 1.4 g/dL      BUN/Creatinine Ratio 23.7     Anion Gap 10.3 mmol/L      eGFR 62.7 mL/min/1.73     Narrative:      GFR Normal >60  Chronic Kidney Disease <60  Kidney Failure <15      COVID-19 and FLU A/B PCR, 1 HR TAT - Swab, Nasopharynx [[id number]]  (Normal) Collected: 11/28/24 2002    Specimen: Swab from Nasopharynx Updated: 11/28/24 2027     COVID19 Not Detected     Influenza A PCR Not Detected     Influenza B PCR Not Detected    Narrative:      Fact sheet for providers: https://www.fda.gov/media/850991/download    Fact sheet for patients: https://www.fda.gov/media/484408/download    Test performed by PCR.    Lipase [[id number]]  (Normal) Collected: 11/28/24 1953    Specimen: Blood Updated: 11/28/24 2025     Lipase 25 U/L     Rapid Strep A Screen - Swab, Throat [[id number]]  (Normal) Collected: 11/28/24 2002    Specimen: Swab from Throat Updated: 11/28/24 2023     STREP A PCR Not Detected    Lactic Acid, Plasma [[id number]]  (Abnormal) Collected: 11/28/24 1953    Specimen: Blood Updated: 11/28/24 2022     Lactate 2.1 mmol/L     Urinalysis With Culture If Indicated - Urine, Clean Catch [[id number]]  (Abnormal) Collected: 11/28/24 2016    Specimen: Urine, Clean Catch Updated: 11/28/24 2021     Color, UA [place]     Appearance, UA Clear     pH, UA <=5.0     Specific Gravity, UA <=1.005     Glucose, UA >=1000 mg/dL (3+)     Ketones, UA Negative     Bilirubin, UA Negative     Blood, UA Large (3+)     Protein,  mg/dL (2+)     Leuk Esterase, UA Negative     Nitrite, UA Positive     Urobilinogen, UA 0.2 E.U./dL    Narrative:      In absence of clinical symptoms, the presence of pyuria, bacteria, and/or nitrites on the urinalysis result does not correlate with infection.    [place] Draw [[id number]] Collected: 11/28/24  1952    Specimen: Blood Updated: 11/28/24 2015    Narrative:      The following orders were created for panel order [place] Draw.  Procedure                               Abnormality         Status                     ---------                               -----------         ------                     Gold Top - SST[[id number]]                                   Final result               Light Blue Top[[id number]]                                   Final result                 Please view results for these tests on the individual orders.    [facility] - SST [[id number]] Collected: 11/28/24 1952    Specimen: Blood Updated: 11/28/24 2015     Extra Tube Hold for add-ons.     Comment: Auto resulted.       Light Blue Top [[id number]] Collected: 11/28/24 1952    Specimen: Blood Updated: 11/28/24 2015     Extra Tube Hold for add-ons.     Comment: Auto resulted       Blood Culture - Blood, Arm, Left [[id number]] Collected: 11/28/24 2003    Specimen: Blood from Arm, Left Updated: 11/28/24 2006    Blood Culture - Blood, Arm, Right [[id number]] Collected: 11/28/24 2003    Specimen: Blood from Arm, Right Updated: 11/28/24 2006    CBC & Differential [[id number]]  (Abnormal) Collected: 11/28/24 1953    Specimen: Blood Updated: 11/28/24 2001    Narrative:      The following orders were created for panel order CBC & Differential.  Procedure                               Abnormality         Status                     ---------                               -----------         ------                     CBC Auto Differential[[id number]]        Abnormal            Final result                 Please view results for these tests on the individual orders.    CBC Auto Differential [[id number]]  (Abnormal) Collected: 11/28/24 1953    Specimen: Blood Updated: 11/28/24 2001     WBC 8.40 10*3/mm3      RBC 5.08 10*6/mm3      Hemoglobin 15.2 g/dL      Hematocrit 45.0 %      MCV 88.6 fL      MCH 29.9 pg      MCHC 33.8 g/dL      RDW 12.3 %      RDW-SD  "40.5 fl      MPV 9.5 fL      Platelets 165 10*3/mm3      Neutrophil % 75.9 %      Lymphocyte % 8.3 %      Monocyte % 9.9 %      Eosinophil % 5.6 %      Basophil % 0.2 %      Immature Grans % 0.1 %      Neutrophils, Absolute 6.37 10*3/mm3      Lymphocytes, Absolute 0.70 10*3/mm3      Monocytes, Absolute 0.83 10*3/mm3      Eosinophils, Absolute 0.47 10*3/mm3      Basophils, Absolute 0.02 10*3/mm3      Immature Grans, Absolute 0.01 10*3/mm3           Data Review:  Results from last 7 days   Lab Units 11/29/24 0542 11/28/24 1953   SODIUM mmol/L 138 133*   POTASSIUM mmol/L 3.7 4.0   CHLORIDE mmol/L 106 98   CO2 mmol/L 21.0* 24.7   BUN mg/dL 22* 31*   CREATININE mg/dL 1.00 1.31*   GLUCOSE mg/dL 135* 415*   CALCIUM mg/dL 8.2* 9.2     Results from last 7 days   Lab Units 11/29/24 0542 11/28/24 1953   WBC 10*3/mm3 9.72 8.40   HEMOGLOBIN g/dL 13.3 15.2   HEMATOCRIT % 38.6 45.0   PLATELETS 10*3/mm3 166 165         Results from last 7 days   Lab Units 11/29/24  0542   HEMOGLOBIN A1C % 8.60*     No results found for: \"TROPONINT\"      Results from last 7 days   Lab Units 11/28/24 1953   ALK PHOS U/L 80   BILIRUBIN mg/dL 0.8   ALT (SGPT) U/L 17   AST (SGOT) U/L 16         Results from last 7 days   Lab Units 11/29/24  0542   HEMOGLOBIN A1C % 8.60*     Glucose   Date/Time Value Ref Range Status   11/29/2024 1115 183 (H) 70 - 130 mg/dL Final   11/29/2024 0658 116 70 - 130 mg/dL Final   11/28/2024 2222 177 (H) 70 - 130 mg/dL Final           Past Medical History:   Diagnosis Date    Acute UTI (urinary tract infection) 11/29/2024    Diabetes mellitus     Elevated PSA     Erectile dysfunction     Hormone disorder     i've been taking testosterone supplements    Kidney stone     Kidney stone 08/23/2022       Assessment:  Active Hospital Problems    Diagnosis  POA    **Sepsis [A41.9]  Yes    Acute UTI (urinary tract infection) [N39.0]  Yes    Type 2 diabetes mellitus with hyperglycemia, without long-term current use of insulin " [E11.65]  Yes    BEAR (obstructive sleep apnea) [G47.33]  Yes      Resolved Hospital Problems   No resolved problems to display.       Plan:  Continue with antibiotics for UTI and await cultures.  Will ask for urology consult.  Follow-up on labs and cultures.    Kraig Lao MD  11/29/2024  12:23 EST

## 2024-11-30 LAB
ANION GAP SERPL CALCULATED.3IONS-SCNC: 22.3 MMOL/L (ref 5–15)
BACTERIA BLD CULT: ABNORMAL
BACTERIA SPEC AEROBE CULT: NO GROWTH
BASOPHILS # BLD AUTO: 0.04 10*3/MM3 (ref 0–0.2)
BASOPHILS NFR BLD AUTO: 0.4 % (ref 0–1.5)
BOTTLE TYPE: ABNORMAL
BUN SERPL-MCNC: 15 MG/DL (ref 6–20)
BUN/CREAT SERPL: 13.8 (ref 7–25)
CALCIUM SPEC-SCNC: 8.9 MG/DL (ref 8.6–10.5)
CHLORIDE SERPL-SCNC: 102 MMOL/L (ref 98–107)
CO2 SERPL-SCNC: 14.7 MMOL/L (ref 22–29)
CREAT SERPL-MCNC: 1.09 MG/DL (ref 0.76–1.27)
DEPRECATED RDW RBC AUTO: 38.8 FL (ref 37–54)
EGFRCR SERPLBLD CKD-EPI 2021: 78.2 ML/MIN/1.73
EOSINOPHIL # BLD AUTO: 0.22 10*3/MM3 (ref 0–0.4)
EOSINOPHIL NFR BLD AUTO: 2.2 % (ref 0.3–6.2)
ERYTHROCYTE [DISTWIDTH] IN BLOOD BY AUTOMATED COUNT: 12 % (ref 12.3–15.4)
GLUCOSE BLDC GLUCOMTR-MCNC: 106 MG/DL (ref 70–130)
GLUCOSE BLDC GLUCOMTR-MCNC: 132 MG/DL (ref 70–130)
GLUCOSE BLDC GLUCOMTR-MCNC: 136 MG/DL (ref 70–130)
GLUCOSE BLDC GLUCOMTR-MCNC: 152 MG/DL (ref 70–130)
GLUCOSE SERPL-MCNC: 115 MG/DL (ref 65–99)
HCT VFR BLD AUTO: 44.2 % (ref 37.5–51)
HGB BLD-MCNC: 14.9 G/DL (ref 13–17.7)
IMM GRANULOCYTES # BLD AUTO: 0.05 10*3/MM3 (ref 0–0.05)
IMM GRANULOCYTES NFR BLD AUTO: 0.5 % (ref 0–0.5)
LYMPHOCYTES # BLD AUTO: 0.93 10*3/MM3 (ref 0.7–3.1)
LYMPHOCYTES NFR BLD AUTO: 9.2 % (ref 19.6–45.3)
MCH RBC QN AUTO: 29.7 PG (ref 26.6–33)
MCHC RBC AUTO-ENTMCNC: 33.7 G/DL (ref 31.5–35.7)
MCV RBC AUTO: 88.2 FL (ref 79–97)
MONOCYTES # BLD AUTO: 1.53 10*3/MM3 (ref 0.1–0.9)
MONOCYTES NFR BLD AUTO: 15.1 % (ref 5–12)
NEUTROPHILS NFR BLD AUTO: 7.33 10*3/MM3 (ref 1.7–7)
NEUTROPHILS NFR BLD AUTO: 72.6 % (ref 42.7–76)
NRBC BLD AUTO-RTO: 0 /100 WBC (ref 0–0.2)
PLATELET # BLD AUTO: 168 10*3/MM3 (ref 140–450)
PMV BLD AUTO: 9.2 FL (ref 6–12)
POTASSIUM SERPL-SCNC: 4 MMOL/L (ref 3.5–5.2)
RBC # BLD AUTO: 5.01 10*6/MM3 (ref 4.14–5.8)
SODIUM SERPL-SCNC: 139 MMOL/L (ref 136–145)
WBC NRBC COR # BLD AUTO: 10.1 10*3/MM3 (ref 3.4–10.8)

## 2024-11-30 PROCEDURE — 82948 REAGENT STRIP/BLOOD GLUCOSE: CPT

## 2024-11-30 PROCEDURE — 85025 COMPLETE CBC W/AUTO DIFF WBC: CPT | Performed by: HOSPITALIST

## 2024-11-30 PROCEDURE — 80048 BASIC METABOLIC PNL TOTAL CA: CPT | Performed by: HOSPITALIST

## 2024-11-30 PROCEDURE — 63710000001 INSULIN LISPRO (HUMAN) PER 5 UNITS

## 2024-11-30 PROCEDURE — 99223 1ST HOSP IP/OBS HIGH 75: CPT | Performed by: INTERNAL MEDICINE

## 2024-11-30 PROCEDURE — 25010000002 MICAFUNGIN SODIUM 100 MG RECONSTITUTED SOLUTION 1 EACH VIAL: Performed by: HOSPITALIST

## 2024-11-30 PROCEDURE — 25010000002 CEFTRIAXONE PER 250 MG: Performed by: INTERNAL MEDICINE

## 2024-11-30 RX ADMIN — OXYCODONE AND ACETAMINOPHEN 1 TABLET: 7.5; 325 TABLET ORAL at 23:40

## 2024-11-30 RX ADMIN — ACETAMINOPHEN 650 MG: 325 TABLET ORAL at 15:52

## 2024-11-30 RX ADMIN — ROSUVASTATIN CALCIUM 20 MG: 20 TABLET, FILM COATED ORAL at 10:12

## 2024-11-30 RX ADMIN — MICAFUNGIN 100 MG: 20 INJECTION, POWDER, LYOPHILIZED, FOR SOLUTION INTRAVENOUS at 17:04

## 2024-11-30 RX ADMIN — TAMSULOSIN HYDROCHLORIDE 0.4 MG: 0.4 CAPSULE ORAL at 10:12

## 2024-11-30 RX ADMIN — CEFTRIAXONE SODIUM 1000 MG: 1 INJECTION, POWDER, FOR SOLUTION INTRAMUSCULAR; INTRAVENOUS at 20:28

## 2024-11-30 RX ADMIN — INSULIN LISPRO 2 UNITS: 100 INJECTION, SOLUTION INTRAVENOUS; SUBCUTANEOUS at 12:16

## 2024-11-30 RX ADMIN — ACETAMINOPHEN 650 MG: 325 TABLET ORAL at 10:11

## 2024-11-30 RX ADMIN — PHENAZOPYRIDINE HYDROCHLORIDE 200 MG: 200 TABLET ORAL at 17:04

## 2024-11-30 RX ADMIN — PHENAZOPYRIDINE HYDROCHLORIDE 200 MG: 200 TABLET ORAL at 10:12

## 2024-11-30 RX ADMIN — PHENAZOPYRIDINE HYDROCHLORIDE 200 MG: 200 TABLET ORAL at 14:27

## 2024-11-30 RX ADMIN — OXYCODONE AND ACETAMINOPHEN 1 TABLET: 7.5; 325 TABLET ORAL at 02:21

## 2024-11-30 NOTE — PROGRESS NOTES
"DAILY PROGRESS NOTE  [facility]    Patient Identification:  Name: Maurizio Dejesus  Age: 59 y.o.  Sex: male  :  1965  MRN: [id number]         Primary Care Physician: Ta Estevez MD    Subjective:  Interval History: He complains of some right-sided abdominal pain but is feeling much better today.    Objective:    Scheduled Meds:cefTRIAXone, 1,000 mg, Intravenous, Q24H  fluconazole, 200 mg, Oral, Q24H  insulin lispro, 2-7 Units, Subcutaneous, 4x Daily AC & at Bedtime  phenazopyridine, 200 mg, Oral, TID With Meals  rosuvastatin, 20 mg, Oral, Daily  tamsulosin, 0.4 mg, Oral, Daily      Continuous Infusions:       Vital signs in last 24 hours:  Temp:  [97.9 °F (36.6 °C)-99.1 °F (37.3 °C)] 99.1 °F (37.3 °C)  Heart Rate:  [] 98  Resp:  [17-18] 17  BP: (139-177)/(77-86) 177/86    Intake/Output:    Intake/Output Summary (Last 24 hours) at 2024 1158  Last data filed at 2024 0951  Gross per 24 hour   Intake 480 ml   Output 2025 ml   Net -1545 ml       Exam:  /86 (BP Location: Right arm, Patient Position: Lying)   Pulse 98   Temp 99.1 °F (37.3 °C) (Oral)   Resp 17   Ht 182.9 cm (72\")   Wt 90.3 kg (199 lb 1.6 oz)   SpO2 98%   BMI 27.00 kg/m²     General Appearance:    Alert, cooperative, no distress   Head:    Normocephalic, without obvious abnormality, atraumatic   Eyes:       Throat:   Lips, tongue, gums normal   Neck:   Supple, symmetrical, trachea midline, no JVD   Lungs:     Clear to auscultation bilaterally, respirations unlabored   Chest Wall:    No tenderness or deformity    Heart:    Regular rate and rhythm, S1 and S2 normal, no murmur,no  rub or gallop   Abdomen:     Soft, nontender, bowel sounds active, no masses, no organomegaly    Extremities:   Extremities normal, atraumatic, no cyanosis or edema   Pulses:      Skin:   Skin is warm and dry,  no rashes or palpable lesions   Neurologic:   no focal deficits noted      Lab Results (last 72 hours)       " Procedure Component Value Units Date/Time    POC Glucose Once [[id number]]  (Abnormal) Collected: 11/29/24 1115    Specimen: Blood Updated: 11/29/24 1117     Glucose 183 mg/dL     Urine Culture - Urine, Urine, Clean Catch [[id number]]  (Normal) Collected: 11/28/24 2016    Specimen: Urine, Clean Catch Updated: 11/29/24 1021     Urine Culture No growth    Basic Metabolic Panel [[id number]]  (Abnormal) Collected: 11/29/24 0542    Specimen: Blood Updated: 11/29/24 0702     Glucose 135 mg/dL      BUN 22 mg/dL      Creatinine 1.00 mg/dL      Sodium 138 mmol/L      Potassium 3.7 mmol/L      Chloride 106 mmol/L      CO2 21.0 mmol/L      Calcium 8.2 mg/dL      BUN/Creatinine Ratio 22.0     Anion Gap 11.0 mmol/L      eGFR 86.7 mL/min/1.73     Narrative:      GFR Normal >60  Chronic Kidney Disease <60  Kidney Failure <15      POC Glucose Once [[id number]]  (Normal) Collected: 11/29/24 0658    Specimen: Blood Updated: 11/29/24 0700     Glucose 116 mg/dL     Hemoglobin A1c [[id number]]  (Abnormal) Collected: 11/29/24 0542    Specimen: Blood Updated: 11/29/24 0648     Hemoglobin A1C 8.60 %     Narrative:      Hemoglobin A1C Ranges:    Increased Risk for Diabetes  5.7% to 6.4%  Diabetes                     >= 6.5%  Diabetic Goal                < 7.0%    CBC (No Diff) [[id number]]  (Abnormal) Collected: 11/29/24 0542    Specimen: Blood Updated: 11/29/24 0641     WBC 9.72 10*3/mm3      RBC 4.42 10*6/mm3      Hemoglobin 13.3 g/dL      Hematocrit 38.6 %      MCV 87.3 fL      MCH 30.1 pg      MCHC 34.5 g/dL      RDW 12.2 %      RDW-SD 38.8 fl      MPV 9.6 fL      Platelets 166 10*3/mm3     STAT Lactic Acid, Reflex [[id number]]  (Normal) Collected: 11/28/24 2255    Specimen: Blood Updated: 11/28/24 2315     Lactate 1.0 mmol/L     Ketone Bodies, Serum (Not performed at [place]) [[id number]]  (Normal) Collected: 11/28/24 1952    Specimen: Blood Updated: 11/28/24 8201    Narrative:      The following orders were created for panel order Ketone  "Bodies, Serum (Not performed at [place]).  Procedure                               Abnormality         Status                     ---------                               -----------         ------                     Beta Hydroxybutyrate [place]...[[id number]]  Normal              Final result                 Please view results for these tests on the individual orders.    Beta Hydroxybutyrate Quantitative [[id number]]  (Normal) Collected: 11/28/24 1952    Specimen: Blood Updated: 11/28/24 2309     Beta-Hydroxybutyrate Quant 0.155 mmol/L     Narrative:      In the assessment of possible diabetic ketoacidosis, the test should be interpreted along with other clinical and laboratory findings.  A level greater than 1 mmol/L should require further evaluation and levels of more than 3 mmol/L require immediate medical review.    POC Glucose Once [[id number]]  (Abnormal) Collected: 11/28/24 2222    Specimen: Blood Updated: 11/28/24 2233     Glucose 177 mg/dL     Procalcitonin [[id number]]  (Abnormal) Collected: 11/28/24 1952    Specimen: Blood Updated: 11/28/24 2233     Procalcitonin 0.75 ng/mL     Narrative:      As a Marker for Sepsis (Non-Neonates):    1. <0.5 ng/mL represents a low risk of severe sepsis and/or septic shock.  2. >2 ng/mL represents a high risk of severe sepsis and/or septic shock.    As a Marker for Lower Respiratory Tract Infections that require antibiotic therapy:    PCT on Admission    Antibiotic Therapy       6-12 Hrs later    >0.5                Strongly Recommended  >0.25 - <0.5        Recommended   0.1 - 0.25          Discouraged              Remeasure/reassess PCT  <0.1                Strongly Discouraged     Remeasure/reassess PCT    As 28 day mortality risk marker: \"Change in Procalcitonin Result\" (>80% or <=80%) if Day 0 (or Day 1) and Day 4 values are available. Refer to http://www.[employer]-pct-calculator.com    Change in PCT <=80%  A decrease of PCT levels below or equal to 80% defines a positive " change in PCT test result representing a higher risk for 28-day all-cause mortality of patients diagnosed with severe sepsis for septic shock.    Change in PCT >80%  A decrease of PCT levels of more than 80% defines a negative change in PCT result representing a lower risk for 28-day all-cause mortality of patients diagnosed with severe sepsis or septic shock.       C-reactive Protein [[id number]]  (Abnormal) Collected: 11/28/24 1952    Specimen: Blood Updated: 11/28/24 2225     C-Reactive Protein 10.56 mg/dL     Urinalysis, Microscopic Only - Urine, Clean Catch [[id number]]  (Abnormal) Collected: 11/28/24 2016    Specimen: Urine, Clean Catch Updated: 11/28/24 2159     RBC, UA Too Numerous to Count /HPF      WBC, UA 6-10 /HPF      Bacteria, UA None Seen /HPF      Squamous Epithelial Cells, UA 0-2 /HPF      Yeast, UA Present /HPF      Hyaline Casts, UA 3-6 /LPF      Methodology Automated Microscopy    [place] Urine Culture Tube - Urine, Clean Catch [[id number]] Collected: 11/28/24 2016    Specimen: Urine, Clean Catch Updated: 11/28/24 2149     Extra Tube Hold for add-ons.     Comment: Auto resulted.       Blood Gas, Venous - [[id number]]  (Abnormal) Collected: 11/28/24 2126    Specimen: Venous Blood Updated: 11/28/24 2128     pH, Venous 7.403 pH Units      pCO2, Venous 36.3 mm Hg      pO2, Venous 44.8 mm Hg      HCO3, Venous 22.6 mmol/L      Base Excess, Venous -1.7 mmol/L      Comment: Serial Number: [id number]:  [id number]        O2 Saturation, Venous 81.0 %      CO2 Content 23.7 mmol/L      Barometric Pressure for Blood Gas 745.8000 mmHg      Modality Room Air    Magnesium [[id number]]  (Normal) Collected: 11/28/24 1953    Specimen: Blood Updated: 11/28/24 2027     Magnesium 2.1 mg/dL     Comprehensive Metabolic Panel [[id number]]  (Abnormal) Collected: 11/28/24 1953    Specimen: Blood Updated: 11/28/24 2027     Glucose 415 mg/dL      BUN 31 mg/dL      Creatinine 1.31 mg/dL      Sodium 133 mmol/L      Potassium 4.0  mmol/L      Chloride 98 mmol/L      CO2 24.7 mmol/L      Calcium 9.2 mg/dL      Total Protein 6.9 g/dL      Albumin 4.0 g/dL      ALT (SGPT) 17 U/L      AST (SGOT) 16 U/L      Alkaline Phosphatase 80 U/L      Total Bilirubin 0.8 mg/dL      Globulin 2.9 gm/dL      A/G Ratio 1.4 g/dL      BUN/Creatinine Ratio 23.7     Anion Gap 10.3 mmol/L      eGFR 62.7 mL/min/1.73     Narrative:      GFR Normal >60  Chronic Kidney Disease <60  Kidney Failure <15      COVID-19 and FLU A/B PCR, 1 HR TAT - Swab, Nasopharynx [[id number]]  (Normal) Collected: 11/28/24 2002    Specimen: Swab from Nasopharynx Updated: 11/28/24 2027     COVID19 Not Detected     Influenza A PCR Not Detected     Influenza B PCR Not Detected    Narrative:      Fact sheet for providers: https://www.fda.gov/media/633094/download    Fact sheet for patients: https://www.fda.gov/media/500267/download    Test performed by PCR.    Lipase [[id number]]  (Normal) Collected: 11/28/24 1953    Specimen: Blood Updated: 11/28/24 2025     Lipase 25 U/L     Rapid Strep A Screen - Swab, Throat [[id number]]  (Normal) Collected: 11/28/24 2002    Specimen: Swab from Throat Updated: 11/28/24 2023     STREP A PCR Not Detected    Lactic Acid, Plasma [[id number]]  (Abnormal) Collected: 11/28/24 1953    Specimen: Blood Updated: 11/28/24 2022     Lactate 2.1 mmol/L     Urinalysis With Culture If Indicated - Urine, Clean Catch [[id number]]  (Abnormal) Collected: 11/28/24 2016    Specimen: Urine, Clean Catch Updated: 11/28/24 2021     Color, UA [place]     Appearance, UA Clear     pH, UA <=5.0     Specific Gravity, UA <=1.005     Glucose, UA >=1000 mg/dL (3+)     Ketones, UA Negative     Bilirubin, UA Negative     Blood, UA Large (3+)     Protein,  mg/dL (2+)     Leuk Esterase, UA Negative     Nitrite, UA Positive     Urobilinogen, UA 0.2 E.U./dL    Narrative:      In absence of clinical symptoms, the presence of pyuria, bacteria, and/or nitrites on the urinalysis result does not  correlate with infection.    [place] Draw [[id number]] Collected: 11/28/24 1952    Specimen: Blood Updated: 11/28/24 2015    Narrative:      The following orders were created for panel order [place] Draw.  Procedure                               Abnormality         Status                     ---------                               -----------         ------                     Gold Top - SST[[id number]]                                   Final result               Light Blue Top[[id number]]                                   Final result                 Please view results for these tests on the individual orders.    Gold Top - SST [[id number]] Collected: 11/28/24 1952    Specimen: Blood Updated: 11/28/24 2015     Extra Tube Hold for add-ons.     Comment: Auto resulted.       Light Blue Top [[id number]] Collected: 11/28/24 1952    Specimen: Blood Updated: 11/28/24 2015     Extra Tube Hold for add-ons.     Comment: Auto resulted       Blood Culture - Blood, Arm, Left [[id number]] Collected: 11/28/24 2003    Specimen: Blood from Arm, Left Updated: 11/28/24 2006    Blood Culture - Blood, Arm, Right [[id number]] Collected: 11/28/24 2003    Specimen: Blood from Arm, Right Updated: 11/28/24 2006    CBC & Differential [[id number]]  (Abnormal) Collected: 11/28/24 1953    Specimen: Blood Updated: 11/28/24 2001    Narrative:      The following orders were created for panel order CBC & Differential.  Procedure                               Abnormality         Status                     ---------                               -----------         ------                     CBC Auto Differential[[id number]]        Abnormal            Final result                 Please view results for these tests on the individual orders.    CBC Auto Differential [[id number]]  (Abnormal) Collected: 11/28/24 1953    Specimen: Blood Updated: 11/28/24 2001     WBC 8.40 10*3/mm3      RBC 5.08 10*6/mm3      Hemoglobin 15.2 g/dL      Hematocrit 45.0 %      MCV  "88.6 fL      MCH 29.9 pg      MCHC 33.8 g/dL      RDW 12.3 %      RDW-SD 40.5 fl      MPV 9.5 fL      Platelets 165 10*3/mm3      Neutrophil % 75.9 %      Lymphocyte % 8.3 %      Monocyte % 9.9 %      Eosinophil % 5.6 %      Basophil % 0.2 %      Immature Grans % 0.1 %      Neutrophils, Absolute 6.37 10*3/mm3      Lymphocytes, Absolute 0.70 10*3/mm3      Monocytes, Absolute 0.83 10*3/mm3      Eosinophils, Absolute 0.47 10*3/mm3      Basophils, Absolute 0.02 10*3/mm3      Immature Grans, Absolute 0.01 10*3/mm3           Data Review:  Results from last 7 days   Lab Units 11/30/24 0659 11/29/24 0542 11/28/24 1953   SODIUM mmol/L 139 138 133*   POTASSIUM mmol/L 4.0 3.7 4.0   CHLORIDE mmol/L 102 106 98   CO2 mmol/L 14.7* 21.0* 24.7   BUN mg/dL 15 22* 31*   CREATININE mg/dL 1.09 1.00 1.31*   GLUCOSE mg/dL 115* 135* 415*   CALCIUM mg/dL 8.9 8.2* 9.2     Results from last 7 days   Lab Units 11/30/24 0659 11/29/24 0542 11/28/24 1953   WBC 10*3/mm3 10.10 9.72 8.40   HEMOGLOBIN g/dL 14.9 13.3 15.2   HEMATOCRIT % 44.2 38.6 45.0   PLATELETS 10*3/mm3 168 166 165         Results from last 7 days   Lab Units 11/29/24  0542   HEMOGLOBIN A1C % 8.60*     No results found for: \"TROPONINT\"      Results from last 7 days   Lab Units 11/28/24 1953   ALK PHOS U/L 80   BILIRUBIN mg/dL 0.8   ALT (SGPT) U/L 17   AST (SGOT) U/L 16         Results from last 7 days   Lab Units 11/29/24 0542   HEMOGLOBIN A1C % 8.60*     Glucose   Date/Time Value Ref Range Status   11/30/2024 1113 152 (H) 70 - 130 mg/dL Final   11/30/2024 0608 106 70 - 130 mg/dL Final   11/29/2024 2031 124 70 - 130 mg/dL Final   11/29/2024 1606 151 (H) 70 - 130 mg/dL Final   11/29/2024 1115 183 (H) 70 - 130 mg/dL Final   11/29/2024 0658 116 70 - 130 mg/dL Final   11/28/2024 2222 177 (H) 70 - 130 mg/dL Final           Past Medical History:   Diagnosis Date    Acute UTI (urinary tract infection) 11/29/2024    Diabetes mellitus     Elevated PSA     Erectile dysfunction     " Hormone disorder     i've been taking testosterone supplements    Kidney stone     Kidney stone 08/23/2022       Assessment:  Active Hospital Problems    Diagnosis  POA    **Sepsis [A41.9]  Yes    Acute UTI (urinary tract infection) [N39.0]  Yes    Type 2 diabetes mellitus with hyperglycemia, without long-term current use of insulin [E11.65]  Yes    BEAR (obstructive sleep apnea) [G47.33]  Yes      Resolved Hospital Problems   No resolved problems to display.       Plan:  Continue with antibiotics for UTI and await cultures.  Candida growing in blood cultures and will ask for ID consult.  Changed to micafungin.   urology consult noted.  Follow-up on labs and cultures.    Kraig Lao MD  11/30/2024  11:58 EST

## 2024-11-30 NOTE — CONSULTS
Referring Provider: Ta Estevez MD      Subjective   History of present illness:.  Past medical records, patient has a history of urolithiasis previously undergoing left ureteroscopy and laser lithotripsy be in September 2024 with removal of stent and October 2024.  He underwent a right ESWL with stent placement on 11/20/2024 and ureteroscopic, laser lithotripsy, stone basket extraction and stent exchange on 11/27/2024.  The day prior to admission he started feeling ill marked by fatigue, right-sided flank pain, confusion and fever and chills.  Given progression of his symptoms he presented to the emergency department.  CT showed well-placed stent but perinephric stranding concerning for pyelonephritis.  He was initially treated with ceftriaxone but was switched to micafungin as blood cultures are now growing Candida glabrata.  Patient says he feels better.  He denies any cardiopulmonary symptoms.  No visual symptoms.  No urinary symptoms.        Physical Exam:   Vital Signs   Temp:  [97.9 °F (36.6 °C)-99.1 °F (37.3 °C)] 97.9 °F (36.6 °C)  Heart Rate:  [] 89  Resp:  [17-18] 17  BP: (165-177)/(77-86) 166/78    GENERAL: Awake and alert, in no acute distress.   HEENT: Oropharynx is clear. Hearing is grossly normal.   EYES: . No conjunctival injection. No lid lag.   LUNGS:normal respiratory effort.   SKIN: no cutaneous eruptions in exposed areas  PSYCHIATRIC: Appropriate mood, affect, insight, and judgment.     Results Review:  WBC 10.1, hgb 14.9, plt 168  Cr1.1  Glucose 106 through 183  11/28 Bcx 1/2 c glabrata  CT a/p The patient has undergone placement of a double-J ureteral stent on the right, as well as right-sided lithotripsy. There is no hydronephrosis, but the patient does have perinephric stranding and heterogeneous enhancement of the right kidney, concerning for pyelonephritis. The patient has undergone placement of a double-J ureteral stent on the right, as well as right-sided lithotripsy. There is  no hydronephrosis, but the patient does have perinephric stranding and heterogeneous enhancement of the right kidney, concerning for pyelonephritis.    CXR, independently interpreted: no acute pna    A/p  1.  Sepsis secondary #2  2.  Candida glabrata fungemia and pyelonephritis  3.  Diabetes mellitus type 2, continue glycemic control efforts to prevent/control infectious complications    Continue micafungin 100 mg IV every 24 hours.  Check repeat blood cultures in a.m. along with TTE but suspect urinary source  May be able to de-escalate to oral antifungals to complete 2 weeks of therapy if sensitivities allow  Stop Rocephin after today's dose     Thank you for this consult.  We will continue to follow along and tailor antibiotics as the patient's clinical course evolves.

## 2024-11-30 NOTE — PROGRESS NOTES
Pt sitting in bed with family at bedside     He is eager to go home     Right peylo   -S/p right URS, LL SBE and stent change on 11/27/2024 with Dr. Diaz     -Urine culture neg from 11/28  -He is on Rocpehin and Diflucan  -Had been on Keflex prior to admission     - no pain   - voiding well     AM labs:  WBC 10.10  Cr 1.09    Plan:   Pt and family understand stent will not be removed Monday as planned but 1-2 more week- office with contact pt   Rec. Home on antibiotics and pyrdium

## 2024-12-01 ENCOUNTER — APPOINTMENT (OUTPATIENT)
Dept: CARDIOLOGY | Facility: HOSPITAL | Age: 59
End: 2024-12-01
Payer: COMMERCIAL

## 2024-12-01 LAB
ANION GAP SERPL CALCULATED.3IONS-SCNC: 25.2 MMOL/L (ref 5–15)
BASOPHILS # BLD AUTO: 0.04 10*3/MM3 (ref 0–0.2)
BASOPHILS NFR BLD AUTO: 0.3 % (ref 0–1.5)
BUN SERPL-MCNC: 21 MG/DL (ref 6–20)
BUN/CREAT SERPL: 16.8 (ref 7–25)
CALCIUM SPEC-SCNC: 9.2 MG/DL (ref 8.6–10.5)
CHLORIDE SERPL-SCNC: 103 MMOL/L (ref 98–107)
CO2 SERPL-SCNC: 7.8 MMOL/L (ref 22–29)
CREAT SERPL-MCNC: 1.25 MG/DL (ref 0.76–1.27)
DEPRECATED RDW RBC AUTO: 40.4 FL (ref 37–54)
EGFRCR SERPLBLD CKD-EPI 2021: 66.3 ML/MIN/1.73
EOSINOPHIL # BLD AUTO: 0.08 10*3/MM3 (ref 0–0.4)
EOSINOPHIL NFR BLD AUTO: 0.7 % (ref 0.3–6.2)
ERYTHROCYTE [DISTWIDTH] IN BLOOD BY AUTOMATED COUNT: 12.4 % (ref 12.3–15.4)
GLUCOSE BLDC GLUCOMTR-MCNC: 171 MG/DL (ref 70–130)
GLUCOSE BLDC GLUCOMTR-MCNC: 174 MG/DL (ref 70–130)
GLUCOSE BLDC GLUCOMTR-MCNC: 200 MG/DL (ref 70–130)
GLUCOSE BLDC GLUCOMTR-MCNC: 214 MG/DL (ref 70–130)
GLUCOSE SERPL-MCNC: 182 MG/DL (ref 65–99)
HCT VFR BLD AUTO: 43 % (ref 37.5–51)
HGB BLD-MCNC: 14.6 G/DL (ref 13–17.7)
IMM GRANULOCYTES # BLD AUTO: 0.05 10*3/MM3 (ref 0–0.05)
IMM GRANULOCYTES NFR BLD AUTO: 0.4 % (ref 0–0.5)
LYMPHOCYTES # BLD AUTO: 0.61 10*3/MM3 (ref 0.7–3.1)
LYMPHOCYTES NFR BLD AUTO: 5.3 % (ref 19.6–45.3)
MCH RBC QN AUTO: 30.5 PG (ref 26.6–33)
MCHC RBC AUTO-ENTMCNC: 34 G/DL (ref 31.5–35.7)
MCV RBC AUTO: 89.8 FL (ref 79–97)
MONOCYTES # BLD AUTO: 1.8 10*3/MM3 (ref 0.1–0.9)
MONOCYTES NFR BLD AUTO: 15.6 % (ref 5–12)
NEUTROPHILS NFR BLD AUTO: 77.7 % (ref 42.7–76)
NEUTROPHILS NFR BLD AUTO: 8.95 10*3/MM3 (ref 1.7–7)
NRBC BLD AUTO-RTO: 0 /100 WBC (ref 0–0.2)
PLATELET # BLD AUTO: 208 10*3/MM3 (ref 140–450)
PMV BLD AUTO: 9.4 FL (ref 6–12)
POTASSIUM SERPL-SCNC: 4.5 MMOL/L (ref 3.5–5.2)
RBC # BLD AUTO: 4.79 10*6/MM3 (ref 4.14–5.8)
SODIUM SERPL-SCNC: 136 MMOL/L (ref 136–145)
WBC NRBC COR # BLD AUTO: 11.53 10*3/MM3 (ref 3.4–10.8)

## 2024-12-01 PROCEDURE — 87040 BLOOD CULTURE FOR BACTERIA: CPT | Performed by: INTERNAL MEDICINE

## 2024-12-01 PROCEDURE — 80048 BASIC METABOLIC PNL TOTAL CA: CPT | Performed by: HOSPITALIST

## 2024-12-01 PROCEDURE — 63710000001 INSULIN LISPRO (HUMAN) PER 5 UNITS

## 2024-12-01 PROCEDURE — 85025 COMPLETE CBC W/AUTO DIFF WBC: CPT | Performed by: HOSPITALIST

## 2024-12-01 PROCEDURE — 82948 REAGENT STRIP/BLOOD GLUCOSE: CPT

## 2024-12-01 PROCEDURE — 25510000001 PERFLUTREN 6.52 MG/ML SUSPENSION 2 ML VIAL: Performed by: INTERNAL MEDICINE

## 2024-12-01 PROCEDURE — 99232 SBSQ HOSP IP/OBS MODERATE 35: CPT | Performed by: INTERNAL MEDICINE

## 2024-12-01 PROCEDURE — 93306 TTE W/DOPPLER COMPLETE: CPT | Performed by: STUDENT IN AN ORGANIZED HEALTH CARE EDUCATION/TRAINING PROGRAM

## 2024-12-01 PROCEDURE — 25010000002 MICAFUNGIN SODIUM 100 MG RECONSTITUTED SOLUTION 1 EACH VIAL: Performed by: HOSPITALIST

## 2024-12-01 PROCEDURE — 93306 TTE W/DOPPLER COMPLETE: CPT

## 2024-12-01 PROCEDURE — 25010000002 ONDANSETRON PER 1 MG

## 2024-12-01 RX ORDER — OXYCODONE AND ACETAMINOPHEN 7.5; 325 MG/1; MG/1
1 TABLET ORAL EVERY 4 HOURS PRN
Status: DISCONTINUED | OUTPATIENT
Start: 2024-12-01 | End: 2024-12-03 | Stop reason: HOSPADM

## 2024-12-01 RX ORDER — FAMOTIDINE 10 MG/ML
20 INJECTION, SOLUTION INTRAVENOUS EVERY 12 HOURS SCHEDULED
Status: DISCONTINUED | OUTPATIENT
Start: 2024-12-01 | End: 2024-12-03 | Stop reason: HOSPADM

## 2024-12-01 RX ADMIN — OXYCODONE AND ACETAMINOPHEN 1 TABLET: 7.5; 325 TABLET ORAL at 20:44

## 2024-12-01 RX ADMIN — MICAFUNGIN 100 MG: 20 INJECTION, POWDER, LYOPHILIZED, FOR SOLUTION INTRAVENOUS at 16:31

## 2024-12-01 RX ADMIN — ROSUVASTATIN CALCIUM 20 MG: 20 TABLET, FILM COATED ORAL at 09:07

## 2024-12-01 RX ADMIN — INSULIN LISPRO 3 UNITS: 100 INJECTION, SOLUTION INTRAVENOUS; SUBCUTANEOUS at 20:44

## 2024-12-01 RX ADMIN — ACETAMINOPHEN 650 MG: 325 TABLET ORAL at 09:08

## 2024-12-01 RX ADMIN — PHENAZOPYRIDINE HYDROCHLORIDE 200 MG: 200 TABLET ORAL at 09:07

## 2024-12-01 RX ADMIN — TAMSULOSIN HYDROCHLORIDE 0.4 MG: 0.4 CAPSULE ORAL at 09:08

## 2024-12-01 RX ADMIN — SENNOSIDES AND DOCUSATE SODIUM 2 TABLET: 50; 8.6 TABLET ORAL at 09:13

## 2024-12-01 RX ADMIN — FAMOTIDINE 20 MG: 10 INJECTION INTRAVENOUS at 23:12

## 2024-12-01 RX ADMIN — PHENAZOPYRIDINE HYDROCHLORIDE 200 MG: 200 TABLET ORAL at 11:56

## 2024-12-01 RX ADMIN — INSULIN LISPRO 3 UNITS: 100 INJECTION, SOLUTION INTRAVENOUS; SUBCUTANEOUS at 11:56

## 2024-12-01 RX ADMIN — INSULIN LISPRO 2 UNITS: 100 INJECTION, SOLUTION INTRAVENOUS; SUBCUTANEOUS at 09:08

## 2024-12-01 RX ADMIN — PERFLUTREN 2 ML: 6.52 INJECTION, SUSPENSION INTRAVENOUS at 17:43

## 2024-12-01 RX ADMIN — ONDANSETRON 4 MG: 2 INJECTION, SOLUTION INTRAMUSCULAR; INTRAVENOUS at 21:48

## 2024-12-01 NOTE — PLAN OF CARE
Goal Outcome Evaluation:            Patient A&Ox4. PRN tylenol given for flank pain. Blood cultures positive. Dr. Lao aware and ID consulted. Wife at bedside. VSS.

## 2024-12-01 NOTE — PROGRESS NOTES
"DAILY PROGRESS NOTE  [facility]    Patient Identification:  Name: Maurizio Dejesus  Age: 59 y.o.  Sex: male  :  1965  MRN: [id number]         Primary Care Physician: Ta Estevez MD    Subjective:  Interval History: He complains of some right-sided abdominal pain but is feeling much better today.    Objective:    Scheduled Meds:insulin lispro, 2-7 Units, Subcutaneous, 4x Daily AC & at Bedtime  micafungin (MYCAMINE) IV, 100 mg, Intravenous, Q24H  rosuvastatin, 20 mg, Oral, Daily  tamsulosin, 0.4 mg, Oral, Daily      Continuous Infusions:       Vital signs in last 24 hours:  Temp:  [97.9 °F (36.6 °C)-99 °F (37.2 °C)] 98.8 °F (37.1 °C)  Heart Rate:  [89-91] 91  Resp:  [16-18] 17  BP: (146-169)/(77-88) 165/77    Intake/Output:    Intake/Output Summary (Last 24 hours) at 2024 1227  Last data filed at 2024 1112  Gross per 24 hour   Intake 720 ml   Output 1600 ml   Net -880 ml       Exam:  /77 (BP Location: Right arm, Patient Position: Sitting)   Pulse 91   Temp 98.8 °F (37.1 °C) (Oral)   Resp 17   Ht 182.9 cm (72\")   Wt 86.3 kg (190 lb 3.2 oz)   SpO2 97%   BMI 25.80 kg/m²     General Appearance:    Alert, cooperative, no distress   Head:    Normocephalic, without obvious abnormality, atraumatic   Eyes:       Throat:   Lips, tongue, gums normal   Neck:   Supple, symmetrical, trachea midline, no JVD   Lungs:     Clear to auscultation bilaterally, respirations unlabored   Chest Wall:    No tenderness or deformity    Heart:    Regular rate and rhythm, S1 and S2 normal, no murmur,no  rub or gallop   Abdomen:     Soft, nontender, bowel sounds active, no masses, no organomegaly    Extremities:   Extremities normal, atraumatic, no cyanosis or edema   Pulses:      Skin:   Skin is warm and dry,  no rashes or palpable lesions   Neurologic:   no focal deficits noted      Lab Results (last 72 hours)       Procedure Component Value Units Date/Time    POC Glucose Once [[id number]]  " (Abnormal) Collected: 11/29/24 1115    Specimen: Blood Updated: 11/29/24 1117     Glucose 183 mg/dL     Urine Culture - Urine, Urine, Clean Catch [[id number]]  (Normal) Collected: 11/28/24 2016    Specimen: Urine, Clean Catch Updated: 11/29/24 1021     Urine Culture No growth    Basic Metabolic Panel [[id number]]  (Abnormal) Collected: 11/29/24 0542    Specimen: Blood Updated: 11/29/24 0702     Glucose 135 mg/dL      BUN 22 mg/dL      Creatinine 1.00 mg/dL      Sodium 138 mmol/L      Potassium 3.7 mmol/L      Chloride 106 mmol/L      CO2 21.0 mmol/L      Calcium 8.2 mg/dL      BUN/Creatinine Ratio 22.0     Anion Gap 11.0 mmol/L      eGFR 86.7 mL/min/1.73     Narrative:      GFR Normal >60  Chronic Kidney Disease <60  Kidney Failure <15      POC Glucose Once [[id number]]  (Normal) Collected: 11/29/24 0658    Specimen: Blood Updated: 11/29/24 0700     Glucose 116 mg/dL     Hemoglobin A1c [[id number]]  (Abnormal) Collected: 11/29/24 0542    Specimen: Blood Updated: 11/29/24 0648     Hemoglobin A1C 8.60 %     Narrative:      Hemoglobin A1C Ranges:    Increased Risk for Diabetes  5.7% to 6.4%  Diabetes                     >= 6.5%  Diabetic Goal                < 7.0%    CBC (No Diff) [[id number]]  (Abnormal) Collected: 11/29/24 0542    Specimen: Blood Updated: 11/29/24 0641     WBC 9.72 10*3/mm3      RBC 4.42 10*6/mm3      Hemoglobin 13.3 g/dL      Hematocrit 38.6 %      MCV 87.3 fL      MCH 30.1 pg      MCHC 34.5 g/dL      RDW 12.2 %      RDW-SD 38.8 fl      MPV 9.6 fL      Platelets 166 10*3/mm3     STAT Lactic Acid, Reflex [[id number]]  (Normal) Collected: 11/28/24 2255    Specimen: Blood Updated: 11/28/24 2315     Lactate 1.0 mmol/L     Ketone Bodies, Serum (Not performed at [place]) [[id number]]  (Normal) Collected: 11/28/24 1952    Specimen: Blood Updated: 11/28/24 2309    Narrative:      The following orders were created for panel order Ketone Bodies, Serum (Not performed at [place]).  Procedure                           "     Abnormality         Status                     ---------                               -----------         ------                     Beta Hydroxybutyrate [place]...[[id number]]  Normal              Final result                 Please view results for these tests on the individual orders.    Beta Hydroxybutyrate Quantitative [[id number]]  (Normal) Collected: 11/28/24 1952    Specimen: Blood Updated: 11/28/24 2309     Beta-Hydroxybutyrate Quant 0.155 mmol/L     Narrative:      In the assessment of possible diabetic ketoacidosis, the test should be interpreted along with other clinical and laboratory findings.  A level greater than 1 mmol/L should require further evaluation and levels of more than 3 mmol/L require immediate medical review.    POC Glucose Once [[id number]]  (Abnormal) Collected: 11/28/24 2222    Specimen: Blood Updated: 11/28/24 2233     Glucose 177 mg/dL     Procalcitonin [[id number]]  (Abnormal) Collected: 11/28/24 1952    Specimen: Blood Updated: 11/28/24 2233     Procalcitonin 0.75 ng/mL     Narrative:      As a Marker for Sepsis (Non-Neonates):    1. <0.5 ng/mL represents a low risk of severe sepsis and/or septic shock.  2. >2 ng/mL represents a high risk of severe sepsis and/or septic shock.    As a Marker for Lower Respiratory Tract Infections that require antibiotic therapy:    PCT on Admission    Antibiotic Therapy       6-12 Hrs later    >0.5                Strongly Recommended  >0.25 - <0.5        Recommended   0.1 - 0.25          Discouraged              Remeasure/reassess PCT  <0.1                Strongly Discouraged     Remeasure/reassess PCT    As 28 day mortality risk marker: \"Change in Procalcitonin Result\" (>80% or <=80%) if Day 0 (or Day 1) and Day 4 values are available. Refer to http://www.[employer]-pct-calculator.com    Change in PCT <=80%  A decrease of PCT levels below or equal to 80% defines a positive change in PCT test result representing a higher risk for 28-day all-cause " mortality of patients diagnosed with severe sepsis for septic shock.    Change in PCT >80%  A decrease of PCT levels of more than 80% defines a negative change in PCT result representing a lower risk for 28-day all-cause mortality of patients diagnosed with severe sepsis or septic shock.       C-reactive Protein [[id number]]  (Abnormal) Collected: 11/28/24 1952    Specimen: Blood Updated: 11/28/24 2225     C-Reactive Protein 10.56 mg/dL     Urinalysis, Microscopic Only - Urine, Clean Catch [[id number]]  (Abnormal) Collected: 11/28/24 2016    Specimen: Urine, Clean Catch Updated: 11/28/24 2159     RBC, UA Too Numerous to Count /HPF      WBC, UA 6-10 /HPF      Bacteria, UA None Seen /HPF      Squamous Epithelial Cells, UA 0-2 /HPF      Yeast, UA Present /HPF      Hyaline Casts, UA 3-6 /LPF      Methodology Automated Microscopy    [place] Urine Culture Tube - Urine, Clean Catch [[id number]] Collected: 11/28/24 2016    Specimen: Urine, Clean Catch Updated: 11/28/24 2149     Extra Tube Hold for add-ons.     Comment: Auto resulted.       Blood Gas, Venous - [[id number]]  (Abnormal) Collected: 11/28/24 2126    Specimen: Venous Blood Updated: 11/28/24 2128     pH, Venous 7.403 pH Units      pCO2, Venous 36.3 mm Hg      pO2, Venous 44.8 mm Hg      HCO3, Venous 22.6 mmol/L      Base Excess, Venous -1.7 mmol/L      Comment: Serial Number: [id number]:  [id number]        O2 Saturation, Venous 81.0 %      CO2 Content 23.7 mmol/L      Barometric Pressure for Blood Gas 745.8000 mmHg      Modality Room Air    Magnesium [[id number]]  (Normal) Collected: 11/28/24 1953    Specimen: Blood Updated: 11/28/24 2027     Magnesium 2.1 mg/dL     Comprehensive Metabolic Panel [[id number]]  (Abnormal) Collected: 11/28/24 1953    Specimen: Blood Updated: 11/28/24 2027     Glucose 415 mg/dL      BUN 31 mg/dL      Creatinine 1.31 mg/dL      Sodium 133 mmol/L      Potassium 4.0 mmol/L      Chloride 98 mmol/L      CO2 24.7 mmol/L      Calcium 9.2 mg/dL       Total Protein 6.9 g/dL      Albumin 4.0 g/dL      ALT (SGPT) 17 U/L      AST (SGOT) 16 U/L      Alkaline Phosphatase 80 U/L      Total Bilirubin 0.8 mg/dL      Globulin 2.9 gm/dL      A/G Ratio 1.4 g/dL      BUN/Creatinine Ratio 23.7     Anion Gap 10.3 mmol/L      eGFR 62.7 mL/min/1.73     Narrative:      GFR Normal >60  Chronic Kidney Disease <60  Kidney Failure <15      COVID-19 and FLU A/B PCR, 1 HR TAT - Swab, Nasopharynx [[id number]]  (Normal) Collected: 11/28/24 2002    Specimen: Swab from Nasopharynx Updated: 11/28/24 2027     COVID19 Not Detected     Influenza A PCR Not Detected     Influenza B PCR Not Detected    Narrative:      Fact sheet for providers: https://www.fda.gov/media/541374/download    Fact sheet for patients: https://www.fda.gov/media/574645/download    Test performed by PCR.    Lipase [[id number]]  (Normal) Collected: 11/28/24 1953    Specimen: Blood Updated: 11/28/24 2025     Lipase 25 U/L     Rapid Strep A Screen - Swab, Throat [[id number]]  (Normal) Collected: 11/28/24 2002    Specimen: Swab from Throat Updated: 11/28/24 2023     STREP A PCR Not Detected    Lactic Acid, Plasma [[id number]]  (Abnormal) Collected: 11/28/24 1953    Specimen: Blood Updated: 11/28/24 2022     Lactate 2.1 mmol/L     Urinalysis With Culture If Indicated - Urine, Clean Catch [[id number]]  (Abnormal) Collected: 11/28/24 2016    Specimen: Urine, Clean Catch Updated: 11/28/24 2021     Color, UA [place]     Appearance, UA Clear     pH, UA <=5.0     Specific Gravity, UA <=1.005     Glucose, UA >=1000 mg/dL (3+)     Ketones, UA Negative     Bilirubin, UA Negative     Blood, UA Large (3+)     Protein,  mg/dL (2+)     Leuk Esterase, UA Negative     Nitrite, UA Positive     Urobilinogen, UA 0.2 E.U./dL    Narrative:      In absence of clinical symptoms, the presence of pyuria, bacteria, and/or nitrites on the urinalysis result does not correlate with infection.    [place] Draw [[id number]] Collected: 11/28/24 1952     Specimen: Blood Updated: 11/28/24 2015    Narrative:      The following orders were created for panel order [place] Draw.  Procedure                               Abnormality         Status                     ---------                               -----------         ------                     Gold Top - SST[[id number]]                                   Final result               Light Blue Top[[id number]]                                   Final result                 Please view results for these tests on the individual orders.    [facility] - SST [[id number]] Collected: 11/28/24 1952    Specimen: Blood Updated: 11/28/24 2015     Extra Tube Hold for add-ons.     Comment: Auto resulted.       Light Blue Top [[id number]] Collected: 11/28/24 1952    Specimen: Blood Updated: 11/28/24 2015     Extra Tube Hold for add-ons.     Comment: Auto resulted       Blood Culture - Blood, Arm, Left [[id number]] Collected: 11/28/24 2003    Specimen: Blood from Arm, Left Updated: 11/28/24 2006    Blood Culture - Blood, Arm, Right [[id number]] Collected: 11/28/24 2003    Specimen: Blood from Arm, Right Updated: 11/28/24 2006    CBC & Differential [[id number]]  (Abnormal) Collected: 11/28/24 1953    Specimen: Blood Updated: 11/28/24 2001    Narrative:      The following orders were created for panel order CBC & Differential.  Procedure                               Abnormality         Status                     ---------                               -----------         ------                     CBC Auto Differential[[id number]]        Abnormal            Final result                 Please view results for these tests on the individual orders.    CBC Auto Differential [[id number]]  (Abnormal) Collected: 11/28/24 1953    Specimen: Blood Updated: 11/28/24 2001     WBC 8.40 10*3/mm3      RBC 5.08 10*6/mm3      Hemoglobin 15.2 g/dL      Hematocrit 45.0 %      MCV 88.6 fL      MCH 29.9 pg      MCHC 33.8 g/dL      RDW 12.3 %      RDW-SD 40.5 fl      " MPV 9.5 fL      Platelets 165 10*3/mm3      Neutrophil % 75.9 %      Lymphocyte % 8.3 %      Monocyte % 9.9 %      Eosinophil % 5.6 %      Basophil % 0.2 %      Immature Grans % 0.1 %      Neutrophils, Absolute 6.37 10*3/mm3      Lymphocytes, Absolute 0.70 10*3/mm3      Monocytes, Absolute 0.83 10*3/mm3      Eosinophils, Absolute 0.47 10*3/mm3      Basophils, Absolute 0.02 10*3/mm3      Immature Grans, Absolute 0.01 10*3/mm3           Data Review:  Results from last 7 days   Lab Units 12/01/24  0751 11/30/24  0659 11/29/24  0542   SODIUM mmol/L 136 139 138   POTASSIUM mmol/L 4.5 4.0 3.7   CHLORIDE mmol/L 103 102 106   CO2 mmol/L 7.8* 14.7* 21.0*   BUN mg/dL 21* 15 22*   CREATININE mg/dL 1.25 1.09 1.00   GLUCOSE mg/dL 182* 115* 135*   CALCIUM mg/dL 9.2 8.9 8.2*     Results from last 7 days   Lab Units 12/01/24  0751 11/30/24  0659 11/29/24  0542   WBC 10*3/mm3 11.53* 10.10 9.72   HEMOGLOBIN g/dL 14.6 14.9 13.3   HEMATOCRIT % 43.0 44.2 38.6   PLATELETS 10*3/mm3 208 168 166         Results from last 7 days   Lab Units 11/29/24  0542   HEMOGLOBIN A1C % 8.60*     No results found for: \"TROPONINT\"      Results from last 7 days   Lab Units 11/28/24  1953   ALK PHOS U/L 80   BILIRUBIN mg/dL 0.8   ALT (SGPT) U/L 17   AST (SGOT) U/L 16         Results from last 7 days   Lab Units 11/29/24  0542   HEMOGLOBIN A1C % 8.60*     Glucose   Date/Time Value Ref Range Status   12/01/2024 1109 214 (H) 70 - 130 mg/dL Final   12/01/2024 0604 174 (H) 70 - 130 mg/dL Final   11/30/2024 2107 136 (H) 70 - 130 mg/dL Final   11/30/2024 1628 132 (H) 70 - 130 mg/dL Final   11/30/2024 1113 152 (H) 70 - 130 mg/dL Final   11/30/2024 0608 106 70 - 130 mg/dL Final   11/29/2024 2031 124 70 - 130 mg/dL Final   11/29/2024 1606 151 (H) 70 - 130 mg/dL Final           Past Medical History:   Diagnosis Date    Acute UTI (urinary tract infection) 11/29/2024    Diabetes mellitus     Elevated PSA     Erectile dysfunction     Hormone disorder     i've been " taking testosterone supplements    Kidney stone     Kidney stone 08/23/2022       Assessment:  Active Hospital Problems    Diagnosis  POA    **Sepsis [A41.9]  Yes    Acute UTI (urinary tract infection) [N39.0]  Yes    Type 2 diabetes mellitus with hyperglycemia, without long-term current use of insulin [E11.65]  Yes    BEAR (obstructive sleep apnea) [G47.33]  Yes      Resolved Hospital Problems   No resolved problems to display.       Plan:  Continue with antibiotics for UTI and await cultures.   ID consult noted.  Continue micafungin.   urology consult noted.  Follow-up on labs and cultures.    Kraig Lao MD  12/1/2024  12:27 EST

## 2024-12-01 NOTE — PROGRESS NOTES
ID note for pyelonephritis/fungemia  Subjective: He is feeling okay.  Denies any pain.  Afebrile.  Had some restlessness yesterday unclear if related to any medication.    Physical Exam:   Vital Signs   Temp:  [97.9 °F (36.6 °C)-99 °F (37.2 °C)] 98.8 °F (37.1 °C)  Heart Rate:  [89-91] 91  Resp:  [16-18] 17  BP: (146-169)/(77-88) 165/77    GENERAL: Awake and alert, in no acute distress.   HEENT: Oropharynx is clear. Hearing is grossly normal.   EYES: . No conjunctival injection. No lid lag.   LUNGS:normal respiratory effort.   SKIN: no cutaneous eruptions in exposed areas  PSYCHIATRIC: Appropriate mood, affect, insight, and judgment.     Results Review:  Glucose 132-174  11/28 Bcx 1/2 c glabrata    A/p  1.  Sepsis secondary #2  2.  Candida glabrata fungemia and pyelonephritis  3.  Diabetes mellitus type 2, continue glycemic control efforts to prevent/control infectious complications    Continue micafungin 100 mg IV every 24 hours.  Check repeat blood cultures today along with TTE but suspect urinary source  May be able to de-escalate to oral antifungals to complete 2 weeks of therapy if sensitivities allow  Stopped Rocephin      Thank you for this consult.  We will continue to follow along and tailor antibiotics as the patient's clinical course evolves.

## 2024-12-01 NOTE — PLAN OF CARE
Goal Outcome Evaluation:  Plan of Care Reviewed With: patient        Progress: improving  Outcome Evaluation: VSS, minimal complaints of pain overnight.  PRN Percocet given.  Voiding per urinal.  Wife at bedside.  Continue Micafungin IV.  Plan is home at discharge with family when okay with all.

## 2024-12-02 LAB
ANION GAP SERPL CALCULATED.3IONS-SCNC: 22.1 MMOL/L (ref 5–15)
AORTIC DIMENSIONLESS INDEX: 0.7 (DI)
ASCENDING AORTA: 3.4 CM
BASOPHILS # BLD AUTO: 0.05 10*3/MM3 (ref 0–0.2)
BASOPHILS NFR BLD AUTO: 0.4 % (ref 0–1.5)
BH CV ECHO MEAS - ACS: 1.91 CM
BH CV ECHO MEAS - AO MAX PG: 14 MMHG
BH CV ECHO MEAS - AO MEAN PG: 8 MMHG
BH CV ECHO MEAS - AO ROOT DIAM: 3.3 CM
BH CV ECHO MEAS - AO V2 MAX: 187 CM/SEC
BH CV ECHO MEAS - AO V2 VTI: 27.7 CM
BH CV ECHO MEAS - AVA(I,D): 2.7 CM2
BH CV ECHO MEAS - EDV(CUBED): 117.6 ML
BH CV ECHO MEAS - EDV(MOD-SP2): 73 ML
BH CV ECHO MEAS - EDV(MOD-SP4): 75 ML
BH CV ECHO MEAS - EF(MOD-BP): 74.6 %
BH CV ECHO MEAS - EF(MOD-SP2): 74 %
BH CV ECHO MEAS - EF(MOD-SP4): 76 %
BH CV ECHO MEAS - ESV(CUBED): 33.6 ML
BH CV ECHO MEAS - ESV(MOD-SP2): 19 ML
BH CV ECHO MEAS - ESV(MOD-SP4): 18 ML
BH CV ECHO MEAS - FS: 34.1 %
BH CV ECHO MEAS - IVS/LVPW: 1.13 CM
BH CV ECHO MEAS - IVSD: 0.9 CM
BH CV ECHO MEAS - LAT PEAK E' VEL: 9.9 CM/SEC
BH CV ECHO MEAS - LV DIASTOLIC VOL/BSA (35-75): 36.1 CM2
BH CV ECHO MEAS - LV MASS(C)D: 141.9 GRAMS
BH CV ECHO MEAS - LV MAX PG: 6.8 MMHG
BH CV ECHO MEAS - LV MEAN PG: 4 MMHG
BH CV ECHO MEAS - LV SYSTOLIC VOL/BSA (12-30): 8.7 CM2
BH CV ECHO MEAS - LV V1 MAX: 130 CM/SEC
BH CV ECHO MEAS - LV V1 VTI: 19.7 CM
BH CV ECHO MEAS - LVIDD: 4.9 CM
BH CV ECHO MEAS - LVIDS: 3.2 CM
BH CV ECHO MEAS - LVOT AREA: 3.8 CM2
BH CV ECHO MEAS - LVOT DIAM: 2.19 CM
BH CV ECHO MEAS - LVPWD: 0.8 CM
BH CV ECHO MEAS - MED PEAK E' VEL: 7.3 CM/SEC
BH CV ECHO MEAS - MV A MAX VEL: 90 CM/SEC
BH CV ECHO MEAS - MV DEC SLOPE: 593.2 CM/SEC2
BH CV ECHO MEAS - MV DEC TIME: 0.21 SEC
BH CV ECHO MEAS - MV E MAX VEL: 76.4 CM/SEC
BH CV ECHO MEAS - MV E/A: 0.85
BH CV ECHO MEAS - MV MAX PG: 4.2 MMHG
BH CV ECHO MEAS - MV MEAN PG: 1.7 MMHG
BH CV ECHO MEAS - MV P1/2T: 45 MSEC
BH CV ECHO MEAS - MV V2 VTI: 20.4 CM
BH CV ECHO MEAS - MVA(P1/2T): 4.9 CM2
BH CV ECHO MEAS - MVA(VTI): 3.6 CM2
BH CV ECHO MEAS - PA ACC TIME: 0.08 SEC
BH CV ECHO MEAS - PA V2 MAX: 168.3 CM/SEC
BH CV ECHO MEAS - QP/QS: 1.04
BH CV ECHO MEAS - RV MAX PG: 9.1 MMHG
BH CV ECHO MEAS - RV V1 MAX: 151 CM/SEC
BH CV ECHO MEAS - RV V1 VTI: 24 CM
BH CV ECHO MEAS - RVOT DIAM: 2.02 CM
BH CV ECHO MEAS - SV(LVOT): 74.3 ML
BH CV ECHO MEAS - SV(MOD-SP2): 54 ML
BH CV ECHO MEAS - SV(MOD-SP4): 57 ML
BH CV ECHO MEAS - SV(RVOT): 77 ML
BH CV ECHO MEAS - SVI(LVOT): 35.8 ML/M2
BH CV ECHO MEAS - SVI(MOD-SP2): 26 ML/M2
BH CV ECHO MEAS - SVI(MOD-SP4): 27.5 ML/M2
BH CV ECHO MEAS - TAPSE (>1.6): 3.1 CM
BH CV ECHO MEASUREMENTS AVERAGE E/E' RATIO: 8.88
BH CV XLRA - RV BASE: 3.1 CM
BH CV XLRA - RV LENGTH: 7.4 CM
BH CV XLRA - RV MID: 2.15 CM
BH CV XLRA - TDI S': 25.3 CM/SEC
BUN SERPL-MCNC: 23 MG/DL (ref 6–20)
BUN/CREAT SERPL: 19.3 (ref 7–25)
CALCIUM SPEC-SCNC: 9.2 MG/DL (ref 8.6–10.5)
CHLORIDE SERPL-SCNC: 104 MMOL/L (ref 98–107)
CO2 SERPL-SCNC: 8.9 MMOL/L (ref 22–29)
CREAT SERPL-MCNC: 1.19 MG/DL (ref 0.76–1.27)
DEPRECATED RDW RBC AUTO: 39.7 FL (ref 37–54)
EGFRCR SERPLBLD CKD-EPI 2021: 70.4 ML/MIN/1.73
EOSINOPHIL # BLD AUTO: 0.26 10*3/MM3 (ref 0–0.4)
EOSINOPHIL NFR BLD AUTO: 2.3 % (ref 0.3–6.2)
ERYTHROCYTE [DISTWIDTH] IN BLOOD BY AUTOMATED COUNT: 12.3 % (ref 12.3–15.4)
GLUCOSE BLDC GLUCOMTR-MCNC: 147 MG/DL (ref 70–130)
GLUCOSE BLDC GLUCOMTR-MCNC: 158 MG/DL (ref 70–130)
GLUCOSE BLDC GLUCOMTR-MCNC: 179 MG/DL (ref 70–130)
GLUCOSE BLDC GLUCOMTR-MCNC: 179 MG/DL (ref 70–130)
GLUCOSE SERPL-MCNC: 191 MG/DL (ref 65–99)
HCT VFR BLD AUTO: 42.7 % (ref 37.5–51)
HGB BLD-MCNC: 14.8 G/DL (ref 13–17.7)
IMM GRANULOCYTES # BLD AUTO: 0.06 10*3/MM3 (ref 0–0.05)
IMM GRANULOCYTES NFR BLD AUTO: 0.5 % (ref 0–0.5)
LEFT ATRIUM VOLUME INDEX: 14.3 ML/M2
LYMPHOCYTES # BLD AUTO: 0.78 10*3/MM3 (ref 0.7–3.1)
LYMPHOCYTES NFR BLD AUTO: 7 % (ref 19.6–45.3)
MCH RBC QN AUTO: 30.6 PG (ref 26.6–33)
MCHC RBC AUTO-ENTMCNC: 34.7 G/DL (ref 31.5–35.7)
MCV RBC AUTO: 88.4 FL (ref 79–97)
MONOCYTES # BLD AUTO: 1.67 10*3/MM3 (ref 0.1–0.9)
MONOCYTES NFR BLD AUTO: 15 % (ref 5–12)
NEUTROPHILS NFR BLD AUTO: 74.8 % (ref 42.7–76)
NEUTROPHILS NFR BLD AUTO: 8.33 10*3/MM3 (ref 1.7–7)
NRBC BLD AUTO-RTO: 0 /100 WBC (ref 0–0.2)
PLATELET # BLD AUTO: 213 10*3/MM3 (ref 140–450)
PMV BLD AUTO: 9.3 FL (ref 6–12)
POTASSIUM SERPL-SCNC: 4.2 MMOL/L (ref 3.5–5.2)
QT INTERVAL: 328 MS
QTC INTERVAL: 393 MS
RBC # BLD AUTO: 4.83 10*6/MM3 (ref 4.14–5.8)
SINUS: 3 CM
SODIUM SERPL-SCNC: 135 MMOL/L (ref 136–145)
WBC NRBC COR # BLD AUTO: 11.15 10*3/MM3 (ref 3.4–10.8)

## 2024-12-02 PROCEDURE — 99232 SBSQ HOSP IP/OBS MODERATE 35: CPT | Performed by: INTERNAL MEDICINE

## 2024-12-02 PROCEDURE — 93010 ELECTROCARDIOGRAM REPORT: CPT | Performed by: INTERNAL MEDICINE

## 2024-12-02 PROCEDURE — 80048 BASIC METABOLIC PNL TOTAL CA: CPT | Performed by: HOSPITALIST

## 2024-12-02 PROCEDURE — 82948 REAGENT STRIP/BLOOD GLUCOSE: CPT

## 2024-12-02 PROCEDURE — 25010000002 MICAFUNGIN SODIUM 100 MG RECONSTITUTED SOLUTION 1 EACH VIAL: Performed by: HOSPITALIST

## 2024-12-02 PROCEDURE — 93005 ELECTROCARDIOGRAM TRACING: CPT | Performed by: HOSPITALIST

## 2024-12-02 PROCEDURE — 85025 COMPLETE CBC W/AUTO DIFF WBC: CPT | Performed by: HOSPITALIST

## 2024-12-02 PROCEDURE — 63710000001 INSULIN LISPRO (HUMAN) PER 5 UNITS

## 2024-12-02 RX ORDER — AMLODIPINE BESYLATE 5 MG/1
5 TABLET ORAL
Status: DISCONTINUED | OUTPATIENT
Start: 2024-12-02 | End: 2024-12-03 | Stop reason: HOSPADM

## 2024-12-02 RX ADMIN — SENNOSIDES AND DOCUSATE SODIUM 2 TABLET: 50; 8.6 TABLET ORAL at 20:36

## 2024-12-02 RX ADMIN — INSULIN LISPRO 2 UNITS: 100 INJECTION, SOLUTION INTRAVENOUS; SUBCUTANEOUS at 12:12

## 2024-12-02 RX ADMIN — TAMSULOSIN HYDROCHLORIDE 0.4 MG: 0.4 CAPSULE ORAL at 08:26

## 2024-12-02 RX ADMIN — MICAFUNGIN 100 MG: 20 INJECTION, POWDER, LYOPHILIZED, FOR SOLUTION INTRAVENOUS at 15:06

## 2024-12-02 RX ADMIN — POLYETHYLENE GLYCOL 3350 17 G: 17 POWDER, FOR SOLUTION ORAL at 10:25

## 2024-12-02 RX ADMIN — OXYCODONE AND ACETAMINOPHEN 1 TABLET: 7.5; 325 TABLET ORAL at 20:36

## 2024-12-02 RX ADMIN — INSULIN LISPRO 2 UNITS: 100 INJECTION, SOLUTION INTRAVENOUS; SUBCUTANEOUS at 08:28

## 2024-12-02 RX ADMIN — AMLODIPINE BESYLATE 5 MG: 5 TABLET ORAL at 12:12

## 2024-12-02 RX ADMIN — ROSUVASTATIN CALCIUM 20 MG: 20 TABLET, FILM COATED ORAL at 08:26

## 2024-12-02 RX ADMIN — SENNOSIDES AND DOCUSATE SODIUM 2 TABLET: 50; 8.6 TABLET ORAL at 10:25

## 2024-12-02 RX ADMIN — INSULIN LISPRO 2 UNITS: 100 INJECTION, SOLUTION INTRAVENOUS; SUBCUTANEOUS at 20:51

## 2024-12-02 RX ADMIN — FAMOTIDINE 20 MG: 10 INJECTION INTRAVENOUS at 08:25

## 2024-12-02 RX ADMIN — FAMOTIDINE 20 MG: 10 INJECTION INTRAVENOUS at 20:36

## 2024-12-02 NOTE — PAYOR COMM NOTE
"Ronak Dejesus \"Nikko\" (59 y.o. Male)        PLEASE SEE ATTACHED FOR INPT AUTH.     REF#[id number]    PLEASE CALL  OR  [phone]    THANK YOU    CORRY HARTMAN LPN CCP   Date of Birth   1965    Social Security Number       Address   [address] [place] [address]    Home Phone   [phone]    MRN   [id number]       Rastafarian   Mormonism    Marital Status                               Admission Date   11/28/24    Admission Type   Urgent    Admitting Provider   Miryam Fam DO    Attending Provider   Kraig Lao MD    Department, Room/Bed   [address], S[address]/1       Discharge Date       Discharge Disposition       Discharge Destination                                 Attending Provider: Kraig Lao MD    Allergies: No Known Allergies    Isolation: None   Infection: None   Code Status: CPR    Ht: 182 cm (71.65\")   Wt: 85 kg (187 lb 8 oz)    Admission Cmt: None   Principal Problem: Sepsis [A41.9]                   Active Insurance as of 11/28/2024       Primary Coverage       Payor Plan Insurance Group Employer/Plan Group    [employer]UE CROSS [facility] [employer] [facility] PPO [id number]       Payor Plan Address Payor Plan Phone Number Payor Plan Fax Number Effective Dates    [phone]  9/16/2021 - None Entered    [facility] [id number]         Subscriber Name Subscriber Birth Date Member ID       RONAK DEJESUS 1965 [id number]                     Emergency Contacts        (Rel.) Home Phone Work Phone Mobile Phone    Beverly (Spouse) [phone] -- --    [employer] (Son) -- -- [phone]    Rodolfo Dejesus (Son) -- -- [phone]              [place]: [facility] [id number]  Tax ID [id number]     History & Physical        Starr Dey, APRN at 11/29/24 0148              Patient Name:  Ronak Dejesus  YOB: 1965  MRN:  [id number]  Admit Date:  11/28/2024  Patient Care " Team:  Ta Estevez MD as PCP - General (Internal Medicine)  Ranjit Washington MD as Consulting Physician (Urology)      Subjective  History Present Illness     Chief Complaint   Patient presents with    Hyperglycemia     History of Present Illness    Mr. Dejesus 59-year-old male with a past medical history of type 2 diabetes, kidney stone, erectile dysfunction, and obstructive sleep apnea presents to [facility] ED with complaints of hyperglycemia.  Patient reports lithotripsy and ureteroscopy with a right sided stent placement on 11/27/2024.  Patient noted fever of 102.9 on arrival to the ED.  Patient noted to be having chills and not able to get comfortable in the bed.  Denies shortness of breath, chest pain, nausea, vomiting, diarrhea, or difficulty urinating.  He does report some right lower quadrant pain, and family member reports that it has been a significant amount of time since he has had any pain medicine.    Review of Systems   Constitutional:  Positive for chills and fever.   Respiratory:  Negative for shortness of breath.    Cardiovascular:  Negative for chest pain.   Gastrointestinal:  Positive for abdominal pain. Negative for diarrhea, nausea and vomiting.   Genitourinary:  Negative for difficulty urinating.        Personal History     Past Medical History:   Diagnosis Date    Acute UTI (urinary tract infection) 11/29/2024    Diabetes mellitus     Elevated PSA     Erectile dysfunction     Hormone disorder     i've been taking testosterone supplements    Kidney stone     Kidney stone 08/23/2022     Past Surgical History:   Procedure Laterality Date    CYSTOSCOPY W/ URETERAL STENT PLACEMENT Left 08/30/2024    Procedure: CYSTOSCOPY URETERAL CATHETER/STENT INSERTION;  Surgeon: Ayush Waldron MD;  Location: [facility];  Service: Urology;  Laterality: Left;    EXTRACORPOREAL SHOCK WAVE LITHOTRIPSY (ESWL) Left 08/30/2024    Procedure: EXTRACORPOREAL SHOCKWAVE LITHOTRIPSY;   Surgeon: Ayush Waldron MD;  [facility] OR;  Service: Urology;  Laterality: Left;    PROSTATE ULTRASOUND BIOPSY  04/2017    negative    VASECTOMY       Family History   Problem Relation Age of Onset    No Known Problems Father     No Known Problems Mother      Social History     Tobacco Use    Smoking status: Never    Smokeless tobacco: Former     Types: Chew   Substance Use Topics    Alcohol use: Not Currently     Comment: A couple glasses of bourbon /mo    Drug use: No     No current facility-administered medications on file prior to encounter.     Current Outpatient Medications on File Prior to Encounter   Medication Sig Dispense Refill    amphetamine-dextroamphetamine XR (Adderall XR) 30 MG 24 hr capsule Take 1 capsule by mouth Every Morning ADHD (Patient taking differently: Take 20 mg by mouth Every Morning ADHD) 30 capsule 0    empagliflozin (JARDIANCE) 25 MG tablet tablet Take 1 tablet by mouth Daily. 90 tablet 1    metFORMIN ER (GLUCOPHAGE-XR) 500 MG 24 hr tablet TAKE TWO TABLETS BY MOUTH TWICE DAILY 120 tablet 1    nabumetone (RELAFEN) 750 MG tablet TAKE ONE TABLET BY MOUTH TWICE DAILY 180 tablet 1    oxyCODONE-acetaminophen (PERCOCET) 7.5-325 MG per tablet Take 1 tablet by mouth Every 6 (Six) Hours As Needed for Moderate Pain.      rosuvastatin (CRESTOR) 20 MG tablet Take 1 tablet by mouth Daily. 90 tablet 1    SITagliptin (JANUVIA) 100 MG tablet Take 1 tablet by mouth Daily. 90 tablet 3    glucose blood test strip Test blood sugar once daily 100 each 12    loratadine-pseudoephedrine (Claritin-D 24 Hour)  MG per 24 hr tablet Claritin-D 24 Hour 10 mg-240 mg tablet,extended release   Take 1 tablet every day by oral route as needed for 90 days. (Patient not taking: Reported on 11/29/2024)      sildenafil (VIAGRA) 100 MG tablet Take 1 tablet by mouth As Needed for Erectile Dysfunction. 18 tablet 13     No Known Allergies    Objective   Objective     Vital Signs  Temp:  [98.2 °F (36.8  "°C)-103.1 °F (39.5 °C)] 98.2 °F (36.8 °C)  Heart Rate:  [] 90  Resp:  [19-20] 19  BP: (110-168)/(56-81) 139/71  SpO2:  [90 %-95 %] 90 %  on   ;      Body mass index is 27.59 kg/m².    Physical Exam  Vitals and nursing note reviewed.   Constitutional:       General: He is not in acute distress.  Cardiovascular:      Rate and Rhythm: Normal rate and regular rhythm.      Heart sounds: Normal heart sounds.   Pulmonary:      Effort: Pulmonary effort is normal. No respiratory distress.      Breath sounds: Normal breath sounds.   Abdominal:      General: Bowel sounds are normal. There is no distension.      Palpations: Abdomen is soft.      Tenderness: There is no abdominal tenderness.   Musculoskeletal:         General: Normal range of motion.   Skin:     General: Skin is warm and dry.   Neurological:      Mental Status: He is alert.         Results Review:  I reviewed the patient's new clinical results.  I reviewed the patient's new imaging results.  I reviewed the patient's other test results.  Discussed with ED provider.    Lab Results (last 24 hours)       Procedure Component Value Units Date/Time    Procalcitonin [[id number]]  (Abnormal) Collected: 11/28/24 1952    Specimen: Blood Updated: 11/28/24 2233     Procalcitonin 0.75 ng/mL     Narrative:      As a Marker for Sepsis (Non-Neonates):    1. <0.5 ng/mL represents a low risk of severe sepsis and/or septic shock.  2. >2 ng/mL represents a high risk of severe sepsis and/or septic shock.    As a Marker for Lower Respiratory Tract Infections that require antibiotic therapy:    PCT on Admission    Antibiotic Therapy       6-12 Hrs later    >0.5                Strongly Recommended  >0.25 - <0.5        Recommended   0.1 - 0.25          Discouraged              Remeasure/reassess PCT  <0.1                Strongly Discouraged     Remeasure/reassess PCT    As 28 day mortality risk marker: \"Change in Procalcitonin Result\" (>80% or <=80%) if Day 0 (or Day 1) and Day 4 " values are available. Refer to http://www.[facility]-pct-calculator.com    Change in PCT <=80%  A decrease of PCT levels below or equal to 80% defines a positive change in PCT test result representing a higher risk for 28-day all-cause mortality of patients diagnosed with severe sepsis for septic shock.    Change in PCT >80%  A decrease of PCT levels of more than 80% defines a negative change in PCT result representing a lower risk for 28-day all-cause mortality of patients diagnosed with severe sepsis or septic shock.       C-reactive Protein [[id number]]  (Abnormal) Collected: 11/28/24 1952    Specimen: Blood Updated: 11/28/24 2225     C-Reactive Protein 10.56 mg/dL     Ketone Bodies, Serum (Not performed at [place]) [[id number]]  (Normal) Collected: 11/28/24 1952    Specimen: Blood Updated: 11/28/24 2309    Narrative:      The following orders were created for panel order Ketone Bodies, Serum (Not performed at [place]).  Procedure                               Abnormality         Status                     ---------                               -----------         ------                     Beta Hydroxybutyrate [place]illy...[[id number]]  Normal              Final result                 Please view results for these tests on the individual orders.    Beta Hydroxybutyrate Quantitative [[id number]]  (Normal) Collected: 11/28/24 1952    Specimen: Blood Updated: 11/28/24 2309     Beta-Hydroxybutyrate Quant 0.155 mmol/L     Narrative:      In the assessment of possible diabetic ketoacidosis, the test should be interpreted along with other clinical and laboratory findings.  A level greater than 1 mmol/L should require further evaluation and levels of more than 3 mmol/L require immediate medical review.    CBC & Differential [[id number]]  (Abnormal) Collected: 11/28/24 1953    Specimen: Blood Updated: 11/28/24 2001    Narrative:      The following orders were created for panel order CBC & Differential.  Procedure                                Abnormality         Status                     ---------                               -----------         ------                     CBC Auto Differential[[id number]]        Abnormal            Final result                 Please view results for these tests on the individual orders.    Comprehensive Metabolic Panel [[id number]]  (Abnormal) Collected: 11/28/24 1953    Specimen: Blood Updated: 11/28/24 2027     Glucose 415 mg/dL      BUN 31 mg/dL      Creatinine 1.31 mg/dL      Sodium 133 mmol/L      Potassium 4.0 mmol/L      Chloride 98 mmol/L      CO2 24.7 mmol/L      Calcium 9.2 mg/dL      Total Protein 6.9 g/dL      Albumin 4.0 g/dL      ALT (SGPT) 17 U/L      AST (SGOT) 16 U/L      Alkaline Phosphatase 80 U/L      Total Bilirubin 0.8 mg/dL      Globulin 2.9 gm/dL      A/G Ratio 1.4 g/dL      BUN/Creatinine Ratio 23.7     Anion Gap 10.3 mmol/L      eGFR 62.7 mL/min/1.73     Narrative:      GFR Normal >60  Chronic Kidney Disease <60  Kidney Failure <15      Lactic Acid, Plasma [[id number]]  (Abnormal) Collected: 11/28/24 1953    Specimen: Blood Updated: 11/28/24 2022     Lactate 2.1 mmol/L     CBC Auto Differential [[id number]]  (Abnormal) Collected: 11/28/24 1953    Specimen: Blood Updated: 11/28/24 2001     WBC 8.40 10*3/mm3      RBC 5.08 10*6/mm3      Hemoglobin 15.2 g/dL      Hematocrit 45.0 %      MCV 88.6 fL      MCH 29.9 pg      MCHC 33.8 g/dL      RDW 12.3 %      RDW-SD 40.5 fl      MPV 9.5 fL      Platelets 165 10*3/mm3      Neutrophil % 75.9 %      Lymphocyte % 8.3 %      Monocyte % 9.9 %      Eosinophil % 5.6 %      Basophil % 0.2 %      Immature Grans % 0.1 %      Neutrophils, Absolute 6.37 10*3/mm3      Lymphocytes, Absolute 0.70 10*3/mm3      Monocytes, Absolute 0.83 10*3/mm3      Eosinophils, Absolute 0.47 10*3/mm3      Basophils, Absolute 0.02 10*3/mm3      Immature Grans, Absolute 0.01 10*3/mm3     Lipase [[id number]]  (Normal) Collected: 11/28/24 1953    Specimen: Blood Updated: 11/28/24  2025     Lipase 25 U/L     Magnesium [[id number]]  (Normal) Collected: 11/28/24 1953    Specimen: Blood Updated: 11/28/24 2027     Magnesium 2.1 mg/dL     Rapid Strep A Screen - Swab, Throat [[id number]]  (Normal) Collected: 11/28/24 2002    Specimen: Swab from Throat Updated: 11/28/24 2023     STREP A PCR Not Detected    COVID-19 and FLU A/B PCR, 1 HR TAT - Swab, Nasopharynx [[id number]]  (Normal) Collected: 11/28/24 2002    Specimen: Swab from Nasopharynx Updated: 11/28/24 2027     COVID19 Not Detected     Influenza A PCR Not Detected     Influenza B PCR Not Detected    Narrative:      Fact sheet for providers: https://www.fda.gov/media/098731/download    Fact sheet for patients: https://www.fda.gov/media/757310/download    Test performed by PCR.    Blood Culture - Blood, Arm, Left [[id number]] Collected: 11/28/24 2003    Specimen: Blood from Arm, Left Updated: 11/28/24 2006    Blood Culture - Blood, Arm, Right [[id number]] Collected: 11/28/24 2003    Specimen: Blood from Arm, Right Updated: 11/28/24 2006    Urinalysis With Culture If Indicated - Urine, Clean Catch [[id number]]  (Abnormal) Collected: 11/28/24 2016    Specimen: Urine, Clean Catch Updated: 11/28/24 2021     Color, UA [place]     Appearance, UA Clear     pH, UA <=5.0     Specific Gravity, UA <=1.005     Glucose, UA >=1000 mg/dL (3+)     Ketones, UA Negative     Bilirubin, UA Negative     Blood, UA Large (3+)     Protein,  mg/dL (2+)     Leuk Esterase, UA Negative     Nitrite, UA Positive     Urobilinogen, UA 0.2 E.U./dL    Narrative:      In absence of clinical symptoms, the presence of pyuria, bacteria, and/or nitrites on the urinalysis result does not correlate with infection.    Urinalysis, Microscopic Only - Urine, Clean Catch [[id number]]  (Abnormal) Collected: 11/28/24 2016    Specimen: Urine, Clean Catch Updated: 11/28/24 2159     RBC, UA Too Numerous to Count /HPF      WBC, UA 6-10 /HPF      Bacteria, UA None Seen /HPF      Squamous Epithelial  Cells, UA 0-2 /HPF      Yeast, UA Present /HPF      Hyaline Casts, UA 3-6 /LPF      Methodology Automated Microscopy    [place] Urine Culture Tube - Urine, Clean Catch [[id number]] Collected: 11/28/24 2016    Specimen: Urine, Clean Catch Updated: 11/28/24 2149     Extra Tube Hold for add-ons.     Comment: Auto resulted.       Urine Culture - Urine, Urine, Clean Catch [[id number]] Collected: 11/28/24 2016    Specimen: Urine, Clean Catch Updated: 11/28/24 2159    Blood Gas, Venous - [[id number]]  (Abnormal) Collected: 11/28/24 2126    Specimen: Venous Blood Updated: 11/28/24 2128     pH, Venous 7.403 pH Units      pCO2, Venous 36.3 mm Hg      pO2, Venous 44.8 mm Hg      HCO3, Venous 22.6 mmol/L      Base Excess, Venous -1.7 mmol/L      Comment: Serial Number: [id number]:  [id number]        O2 Saturation, Venous 81.0 %      CO2 Content 23.7 mmol/L      Barometric Pressure for Blood Gas 745.8000 mmHg      Modality Room Air    POC Glucose Once [[id number]]  (Abnormal) Collected: 11/28/24 2222    Specimen: Blood Updated: 11/28/24 2233     Glucose 177 mg/dL     STAT Lactic Acid, Reflex [[id number]]  (Normal) Collected: 11/28/24 2255    Specimen: Blood Updated: 11/28/24 2315     Lactate 1.0 mmol/L             Imaging Results (Last 24 Hours)       Procedure Component Value Units Date/Time    CT Abdomen Pelvis With Contrast [[id number]] Collected: 11/28/24 2118     Updated: 11/28/24 2136    Narrative:      CT OF THE ABDOMEN PELVIS WITH CONTRAST     HISTORY: Sepsis     COMPARISON: August 30, 2024     TECHNIQUE: Axial CT imaging was obtained through the abdomen and pelvis.  IV contrast was administered.     FINDINGS:  Images through the lung bases demonstrate dependent atelectasis. A few  tiny cysts are noted within the liver. There is a small hiatal hernia.  The duodenum, adrenal glands, and spleen are normal. There is some  minimal pancreatic atrophy. Gallbladder is normal. The patient has a  double-J ureteral stent noted on the  right. No hydronephrosis is seen.  There is some perinephric stranding which is noted on the right, as well  as heterogeneous enhancement of the right kidney, suggesting  pyelonephritis. There is a stable 5 mm nonobstructing stone within the  right kidney. A previously noted small staghorn calculus within the  right kidney is no longer identified. The patient does have a small  stone fragment within the urinary bladder. No stones are noted on the  left. There is no hydronephrosis on the left. There is air within the  urinary bladder, in keeping with recent procedure. The prostate gland is  enlarged and contains dystrophic calcifications. There is no bowel  obstruction. There is colonic diverticulosis. The appendix is normal. No  acute osseous abnormalities are seen. There are small bilateral  fat-containing inguinal hernias.       Impression:      The patient has undergone placement of a double-J ureteral stent on the  right, as well as right-sided lithotripsy. There is no hydronephrosis,  but the patient does have perinephric stranding and heterogeneous  enhancement of the right kidney, concerning for pyelonephritis.     Radiation dose reduction techniques were utilized, including automated  exposure control and exposure modulation based on body size.        This report was finalized on 11/28/2024 9:33 PM by Dr. Anel Recinos M.D on Workstation: [id number]       XR Chest 1 View [[id number]] Collected: 11/28/24 2043     Updated: 11/28/24 2046    Narrative:      SINGLE VIEW OF THE CHEST     HISTORY: Fever     COMPARISON: None available.     FINDINGS:  Heart size is within normal limits. No pneumothorax, pleural effusion,  or acute infiltrate is seen.       Impression:      No acute findings.     This report was finalized on 11/28/2024 8:43 PM by Dr. Anel Recinos M.D on Workstation: [id number]                 No orders to display     Assessment/Plan   Assessment & Plan  Active Hospital Problems     Diagnosis  POA    **Sepsis [A41.9]  Yes    Acute UTI (urinary tract infection) [N39.0]  Yes    Type 2 diabetes mellitus with hyperglycemia, without long-term current use of insulin [E11.65]  Yes    BEAR (obstructive sleep apnea) [G47.33]  Yes      Resolved Hospital Problems   No resolved problems to display.     Mr. Dejesus 59-year-old male with a past medical history of type 2 diabetes, kidney stone, erectile dysfunction, and obstructive sleep apnea presents to [facility] ED with complaints of hyperglycemia.  Patient reports lithotripsy and ureteroscopy with a right sided stent placement on 11/27/2024.       Sepsis  Acute UTI  -Presenting with temperature 102.9 to the ED  -Status post 1 day postop outpatient lithotripsy  -Urinalysis positive for nitrites, large amount of blood  -Urine culture and blood cultures pending  -Initial lactic 2.1, repeat 1.0  C-reactive protein elevated at 10.56, and procalcitonin elevated at 0.75  -Received 2 L bolus in the ED, will continue IV fluids overnight  -Received ceftriaxone in the ED, will continue pending urine and blood culture  -Bladder scan  -Supportive care for pain control  -Telemetry monitoring  -Strict I&O    Type 2 diabetes mellitus   - Most recent A1c: 8.3 from 12/16/2022, will repeat  -Glucose 415 on arrival to ED, treated with 8 units of Humalog, repeat glucose 177  -Currently takes Jardiance, metformin, and Januvia at home, will hold  -Glucose checks before meals and at bedtime  -Correctional sliding scale insulin for hyperglycemia  -Healthy heart consistent carb diet     Sleep Apnea  -Continuous pulse oximetry  -May use home CPAP if available  -Supplemental oxygen as needed      SCDs for DVT prophylaxis.  Full code.  Discussed with patient and family.      NITHIN Warner  [facility]  11/29/24  04:02 EST        Electronically signed by Starr Dey APRN at 11/29/24 0402          Emergency Department Notes     "    Alessandra Lopes RN at 11/28/24 2345          Pt ambulated to vehicle without difficulty.  Stable at time of discharge from ER.      Electronically signed by Alessandra Lopes RN at 11/29/24 0013         Alessandra Lopes RN at 11/28/24 2309          Hospitalist returned called to Dr. Fernández.       Electronically signed by Alessandra Lopes RN at 11/28/24 2310       Alessandra Lopes RN at 11/28/24 2010          Pt to Room 03 with complaint of fever starting today.  Pt is one day post urerter stent placement on the left side.  States he has been on Keflex.  Per family, patient states he is having bouts on confusion.  Warm to touch.  No further complaints other than \"Not feeling great.\"  Pt has POC Accucheck at home of 400+.  States that he only checks his sugar once a day.      Electronically signed by Alessandra Lopes RN at 11/28/24 2012       Maxwell Fernández MD at 11/28/24 2000                  EMERGENCY DEPARTMENT ENCOUNTER    Room Number:  03/03  Date seen:  11/28/2024  Time seen: 20:00 EST  PCP: Ta Estevez MD  Historian:     Discussed/obtained information from independent historians:     HPI:    The patient is a 59-year-old male.  He presents with reported hyperglycemia at home today.  Of note he underwent lithotripsy with right-sided stent placement yesterday.  He does report that he had a fever couple days ago but was afebrile yesterday prior to the procedure.  Today his wife did note some chills.  He was unaware that he had developed a fever prior to arrival here.  He was noted to be febrile to 102.9 at triage.  He denies any respiratory symptoms such as sore throat nasal congestion or cough.  He is on Pyridium so cannot really comment on the color of his urine.  No significant flank or abdominal pain at this time.  No sick contacts at home.  No shortness of breath currently      External (non-ED) record review:        Chronic or social conditions impacting " care:    ALLERGIES  Patient has no known allergies.    PAST MEDICAL HISTORY  Active Ambulatory Problems     Diagnosis Date Noted    Type 2 diabetes mellitus without complication, without long-term current use of insulin 06/09/2020    Elevated PSA 08/23/2022    Erectile dysfunction 08/23/2022    Hormone disorder 08/23/2022    Kidney stone 08/23/2022    ADD (attention deficit disorder) 08/23/2022    BEAR (obstructive sleep apnea) 04/18/2023     Resolved Ambulatory Problems     Diagnosis Date Noted    No Resolved Ambulatory Problems     Past Medical History:   Diagnosis Date    Diabetes mellitus        PAST SURGICAL HISTORY  Past Surgical History:   Procedure Laterality Date    CYSTOSCOPY W/ URETERAL STENT PLACEMENT Left 8/30/2024    Procedure: CYSTOSCOPY URETERAL CATHETER/STENT INSERTION;  Surgeon: Ayush Waldron MD;  Location: [facility];  Service: Urology;  Laterality: Left;    EXTRACORPOREAL SHOCK WAVE LITHOTRIPSY (ESWL) Left 8/30/2024    Procedure: EXTRACORPOREAL SHOCKWAVE LITHOTRIPSY;  Surgeon: Ayush Waldron MD;  Location: [facility];  Service: Urology;  Laterality: Left;    PROSTATE ULTRASOUND BIOPSY  04/2017    negative    VASECTOMY         FAMILY HISTORY  Family History   Problem Relation Age of Onset    No Known Problems Father     No Known Problems Mother        SOCIAL HISTORY  Social History     Socioeconomic History    Marital status:    Tobacco Use    Smoking status: Never    Smokeless tobacco: Former     Types: Chew   Substance and Sexual Activity    Alcohol use: Yes     Comment: A couple glasses of bourbon /mo    Drug use: No    Sexual activity: Yes     Partners: Female     Birth control/protection: Surgical       REVIEW OF SYSTEMS  Review of Systems    All systems reviewed and negative except for those discussed in HPI.       PHYSICAL EXAM    I have reviewed the triage vital signs and nursing notes.  Vitals:    11/28/24 2300   BP: 114/67   Pulse: 90   Resp:    Temp:    SpO2: 91%      Physical Exam    Vital signs: Reviewed in nurses notes    General: Patient is awake alert.  He does appear to feel poorly but is in no respiratory distress    HEENT: Normocephalic atraumatic nasopharynx clear.  Oropharynx is slightly erythematous and slightly dry.    Neck:   Supple without meningismus.    Respiratory:   Clear to auscultation bilaterally with equal breath sounds    Cardiovascular: Rate rate and rhythm no murmurs.  No pretibial or pedal edema    Abdomen: Very soft nondistended.  Very minimal right lower quadrant and right flank tenderness to deep palpation without guarding or rebound    Skin:   Warm and dry.  No rashes noted    Neurological examination: Patient is awake alert oriented x4.  Speech is normal.  No facial palsy.  No focal motor or sensory deficits.      LAB RESULTS  Recent Results (from the past 24 hours)   Procalcitonin    Collection Time: 11/28/24  7:52 PM    Specimen: Blood   Result Value Ref Range    Procalcitonin 0.75 (H) 0.00 - 0.25 ng/mL   C-reactive Protein    Collection Time: 11/28/24  7:52 PM    Specimen: Blood   Result Value Ref Range    C-Reactive Protein 10.56 (H) 0.00 - 0.50 mg/dL   Gold Top - SST    Collection Time: 11/28/24  7:52 PM   Result Value Ref Range    Extra Tube Hold for add-ons.    Light Blue Top    Collection Time: 11/28/24  7:52 PM   Result Value Ref Range    Extra Tube Hold for add-ons.    Beta Hydroxybutyrate Quantitative    Collection Time: 11/28/24  7:52 PM    Specimen: Blood   Result Value Ref Range    Beta-Hydroxybutyrate Quant 0.155 0.020 - 0.270 mmol/L   Comprehensive Metabolic Panel    Collection Time: 11/28/24  7:53 PM    Specimen: Blood   Result Value Ref Range    Glucose 415 (C) 65 - 99 mg/dL    BUN 31 (H) 6 - 20 mg/dL    Creatinine 1.31 (H) 0.76 - 1.27 mg/dL    Sodium 133 (L) 136 - 145 mmol/L    Potassium 4.0 3.5 - 5.2 mmol/L    Chloride 98 98 - 107 mmol/L    CO2 24.7 22.0 - 29.0 mmol/L    Calcium 9.2 8.6 - 10.5 mg/dL    Total Protein 6.9 6.0 -  8.5 g/dL    Albumin 4.0 3.5 - 5.2 g/dL    ALT (SGPT) 17 1 - 41 U/L    AST (SGOT) 16 1 - 40 U/L    Alkaline Phosphatase 80 39 - 117 U/L    Total Bilirubin 0.8 0.0 - 1.2 mg/dL    Globulin 2.9 gm/dL    A/G Ratio 1.4 g/dL    BUN/Creatinine Ratio 23.7 7.0 - 25.0    Anion Gap 10.3 5.0 - 15.0 mmol/L    eGFR 62.7 >60.0 mL/min/1.73   Lactic Acid, Plasma    Collection Time: 11/28/24  7:53 PM    Specimen: Blood   Result Value Ref Range    Lactate 2.1 (C) 0.5 - 2.0 mmol/L   CBC Auto Differential    Collection Time: 11/28/24  7:53 PM    Specimen: Blood   Result Value Ref Range    WBC 8.40 3.40 - 10.80 10*3/mm3    RBC 5.08 4.14 - 5.80 10*6/mm3    Hemoglobin 15.2 13.0 - 17.7 g/dL    Hematocrit 45.0 37.5 - 51.0 %    MCV 88.6 79.0 - 97.0 fL    MCH 29.9 26.6 - 33.0 pg    MCHC 33.8 31.5 - 35.7 g/dL    RDW 12.3 12.3 - 15.4 %    RDW-SD 40.5 37.0 - 54.0 fl    MPV 9.5 6.0 - 12.0 fL    Platelets 165 140 - 450 10*3/mm3    Neutrophil % 75.9 42.7 - 76.0 %    Lymphocyte % 8.3 (L) 19.6 - 45.3 %    Monocyte % 9.9 5.0 - 12.0 %    Eosinophil % 5.6 0.3 - 6.2 %    Basophil % 0.2 0.0 - 1.5 %    Immature Grans % 0.1 0.0 - 0.5 %    Neutrophils, Absolute 6.37 1.70 - 7.00 10*3/mm3    Lymphocytes, Absolute 0.70 0.70 - 3.10 10*3/mm3    Monocytes, Absolute 0.83 0.10 - 0.90 10*3/mm3    Eosinophils, Absolute 0.47 (H) 0.00 - 0.40 10*3/mm3    Basophils, Absolute 0.02 0.00 - 0.20 10*3/mm3    Immature Grans, Absolute 0.01 0.00 - 0.05 10*3/mm3   Lipase    Collection Time: 11/28/24  7:53 PM    Specimen: Blood   Result Value Ref Range    Lipase 25 13 - 60 U/L   Magnesium    Collection Time: 11/28/24  7:53 PM    Specimen: Blood   Result Value Ref Range    Magnesium 2.1 1.6 - 2.6 mg/dL   Rapid Strep A Screen - Swab, Throat    Collection Time: 11/28/24  8:02 PM    Specimen: Throat; Swab   Result Value Ref Range    STREP A PCR Not Detected Not Detected   COVID-19 and FLU A/B PCR, 1 HR TAT - Swab, Nasopharynx    Collection Time: 11/28/24  8:02 PM    Specimen:  Nasopharynx; Swab   Result Value Ref Range    COVID19 Not Detected Not Detected - Ref. Range    Influenza A PCR Not Detected Not Detected    Influenza B PCR Not Detected Not Detected   Urinalysis With Culture If Indicated - Urine, Clean Catch    Collection Time: 11/28/24  8:16 PM    Specimen: Urine, Clean Catch   Result Value Ref Range    Color, UA Orange (A) Yellow, Straw    Appearance, UA Clear Clear    pH, UA <=5.0 5.0 - 8.0    Specific Gravity, UA <=1.005 1.005 - 1.030    Glucose, UA >=1000 mg/dL (3+) (A) Negative    Ketones, UA Negative Negative    Bilirubin, UA Negative Negative    Blood, UA Large (3+) (A) Negative    Protein,  mg/dL (2+) (A) Negative    Leuk Esterase, UA Negative Negative    Nitrite, UA Positive (A) Negative    Urobilinogen, UA 0.2 E.U./dL 0.2 - 1.0 E.U./dL   Urinalysis, Microscopic Only - Urine, Clean Catch    Collection Time: 11/28/24  8:16 PM    Specimen: Urine, Clean Catch   Result Value Ref Range    RBC, UA Too Numerous to Count (A) None Seen, 0-2 /HPF    WBC, UA 6-10 (A) None Seen, 0-2 /HPF    Bacteria, UA None Seen None Seen /HPF    Squamous Epithelial Cells, UA 0-2 None Seen, 0-2 /HPF    Yeast, UA Present (A) None Seen /HPF    Hyaline Casts, UA 3-6 None Seen /LPF    Methodology Automated Microscopy    [place] Urine Culture Tube - Urine, Clean Catch    Collection Time: 11/28/24  8:16 PM    Specimen: Urine, Clean Catch   Result Value Ref Range    Extra Tube Hold for add-ons.    Blood Gas, Venous -    Collection Time: 11/28/24  9:26 PM    Specimen: Venous Blood   Result Value Ref Range    pH, Venous 7.403 7.310 - 7.410 pH Units    pCO2, Venous 36.3 (L) 41.0 - 51.0 mm Hg    pO2, Venous 44.8 (H) 35.0 - 42.0 mm Hg    HCO3, Venous 22.6 22.0 - 26.0 mmol/L    Base Excess, Venous -1.7 -2.0 - 2.0 mmol/L    O2 Saturation, Venous 81.0 (H) 45.0 - 75.0 %    CO2 Content 23.7 23 - 27 mmol/L    Barometric Pressure for Blood Gas 745.8000 mmHg    Modality Room Air    POC Glucose Once    Collection  Time: 11/28/24 10:22 PM    Specimen: Blood   Result Value Ref Range    Glucose 177 (H) 70 - 130 mg/dL       Ordered the above labs and independently interpreted results.  My findings will be discussed in the ED course or medical decision making section below      PROCEDURES:  Procedures      RADIOLOGY RESULTS  CT Abdomen Pelvis With Contrast    Result Date: 11/28/2024  CT OF THE ABDOMEN PELVIS WITH CONTRAST  HISTORY: Sepsis  COMPARISON: August 30, 2024  TECHNIQUE: Axial CT imaging was obtained through the abdomen and pelvis. IV contrast was administered.  FINDINGS: Images through the lung bases demonstrate dependent atelectasis. A few tiny cysts are noted within the liver. There is a small hiatal hernia. The duodenum, adrenal glands, and spleen are normal. There is some minimal pancreatic atrophy. Gallbladder is normal. The patient has a double-J ureteral stent noted on the right. No hydronephrosis is seen. There is some perinephric stranding which is noted on the right, as well as heterogeneous enhancement of the right kidney, suggesting pyelonephritis. There is a stable 5 mm nonobstructing stone within the right kidney. A previously noted small staghorn calculus within the right kidney is no longer identified. The patient does have a small stone fragment within the urinary bladder. No stones are noted on the left. There is no hydronephrosis on the left. There is air within the urinary bladder, in keeping with recent procedure. The prostate gland is enlarged and contains dystrophic calcifications. There is no bowel obstruction. There is colonic diverticulosis. The appendix is normal. No acute osseous abnormalities are seen. There are small bilateral fat-containing inguinal hernias.      The patient has undergone placement of a double-J ureteral stent on the right, as well as right-sided lithotripsy. There is no hydronephrosis, but the patient does have perinephric stranding and heterogeneous enhancement of the  right kidney, concerning for pyelonephritis.  Radiation dose reduction techniques were utilized, including automated exposure control and exposure modulation based on body size.   This report was finalized on 11/28/2024 9:33 PM by Dr. Anel Recinos M.D on Workstation: [id number]      XR Chest 1 View    Result Date: 11/28/2024  SINGLE VIEW OF THE CHEST  HISTORY: Fever  COMPARISON: None available.  FINDINGS: Heart size is within normal limits. No pneumothorax, pleural effusion, or acute infiltrate is seen.      No acute findings.  This report was finalized on 11/28/2024 8:43 PM by Dr. Anel Recinos M.D on Workstation: [id number]        Ordered the above noted radiological studies.  Independently interpreted by me.  My findings will be discussed in the medical decision section below.     PROGRESS, DATA ANALYSIS, CONSULTS AND MEDICAL DECISION MAKING    Please note that this section constitutes my independent interpretation of clinical data including lab results, radiology, EKG's.  This constitutes my independent professional opinion regarding differential diagnosis and management of this patient.  It may include any factors such as history from outside sources, review of external records, social determinants of health, management of medications, response to those treatments, and discussions with other providers.    ED Course as of 11/28/24 2315   Thu Nov 28, 2024   2304 A paged for admission.  [facility], has kindly accepted him for admission under care of Dr. Fam    Patient is much improved at this time.  His temperature has improved to 99.8.   Glucose repeat after hydration and insulin is 177.    Blood cultures x 2 as well as urine culture obtained.  Patient has been dosed with Rocephin 2 g IV.  CT reveals stranding on the right kidney consistent with pyelonephritis.  No other intra-abdominal abnormalities.  Stent noted to be in place.  Chest x-ray is clear.   [TC]      ED Course User Index  [TC]  Maxwell Fernández MD     Orders placed during this visit:  Orders Placed This Encounter   Procedures    Rapid Strep A Screen - Swab, Throat    Blood Culture - Blood,    Blood Culture - Blood,    COVID-19 and FLU A/B PCR, 1 HR TAT - Swab, Nasopharynx    Urine Culture - Urine,    XR Chest 1 View    CT Abdomen Pelvis With Contrast    Comprehensive Metabolic Panel    Lactic Acid, Plasma    CBC Auto Differential    Lipase    Magnesium    Urinalysis With Culture If Indicated -    Procalcitonin    C-reactive Protein    [place] Draw    Urinalysis, Microscopic Only - Urine, Clean Catch    [place] Urine Culture Tube -    STAT Lactic Acid, Reflex    Blood Gas, Venous -    Beta Hydroxybutyrate Quantitative    POC Glucose Once    Inpatient Admission    CBC & Differential    Gold Top - SST    Light Blue Top    Ketone Bodies, Serum (Not performed at [place])    ED Acknowledgement Form Needed;       Medications   acetaminophen (TYLENOL) tablet 1,000 mg (1,000 mg Oral Given 11/28/24 2006)   sodium chloride 0.9 % bolus 1,000 mL (0 mL Intravenous Stopped 11/28/24 2117)   cefTRIAXone (ROCEPHIN) 2,000 mg in sodium chloride 0.9 % 100 mL MBP (0 mg Intravenous Stopped 11/28/24 2117)   sodium chloride 0.9 % bolus 1,000 mL (0 mL Intravenous Stopped 11/28/24 2219)   insulin regular (humuLIN R,novoLIN R) injection 8 Units (8 Units Intravenous Given 11/28/24 2051)   iopamidol (ISOVUE-370) 76 % injection 100 mL (100 mL Intravenous Given 11/28/24 2114)            Medical Decision Making          DIAGNOSIS  Final diagnoses:   Sepsis, due to unspecified organism, unspecified whether acute organ dysfunction present   Pyelonephritis          Medication List      No changes were made to your prescriptions during this visit.         FOLLOW-UP  No follow-up provider specified.      Latest Documented Vital Signs:  As of 23:15 EST  BP- 114/67 HR- 90 Temp- 99.8 °F (37.7 °C) (Oral) O2 sat- 91%    Appropriate PPE utilized throughout this patient encounter  to include mask, if indicated, per current protocol. Hand hygiene was performed before donning PPE and after removal when leaving the room.    Please note that portions of this were completed with a voice recognition program.     Note Disclaimer: At [facility], we believe that sharing information builds trust and better relationships. You are receiving this note because you are receiving care at [facility] or recently visited. It is possible you will see health information before a provider has talked with you about it. This kind of information can be easy to misunderstand. To help you fully understand what it means for your health, we urge you to discuss this note with your provider.                Electronically signed by Maxwell Fernández MD at 11/28/24 1031       Oxygen Therapy (since admission)       Date/Time SpO2 Device (Oxygen Therapy) Flow (L/min) (Oxygen Therapy) Oxygen Concentration (%) ETCO2 (mmHg)    12/02/24 0805 97 -- -- -- --    12/01/24 2357 -- room air -- -- --    12/01/24 2052 -- room air -- -- --    12/01/24 2017 98 -- -- -- --    12/01/24 1305 97 -- -- -- --    12/01/24 1224 -- room air -- -- --    12/01/24 0908 -- room air -- -- --    12/01/24 0745 97 -- -- -- --    12/01/24 0010 -- room air -- -- --    11/30/24 2334 -- room air -- -- --    11/30/24 2034 -- room air -- -- --    11/30/24 2025 96 -- -- -- --    11/30/24 1450 -- room air -- -- --    11/30/24 1345 98 -- -- -- --    11/30/24 1011 -- room air -- -- --    11/30/24 0720 98 -- -- -- --    11/30/24 0020 -- room air -- -- --    11/30/24 0013 97 -- -- -- --    11/29/24 2042 -- room air -- -- --    11/29/24 2034 98 -- -- -- --    11/29/24 1420 -- room air -- -- --    11/29/24 1251 94 room air -- -- --    11/29/24 0845 -- room air -- -- --    11/29/24 0718 96 room air -- -- --    11/28/24 2336 90 -- -- -- --    11/28/24 2330 91 -- -- -- --    11/28/24 2328 92 -- -- -- --    11/28/24 2300 91 -- -- -- --    11/28/24 2130 95 -- -- --  --    11/28/24 2116 95 -- -- -- --    11/28/24 1946 93 -- -- -- --          Intake & Output (last 5 days)         11/27 0701 11/28 0700 11/28 0701 11/29 0700 11/29 0701 11/30 0700 11/30 0701  12/01 0700 12/01 0701  12/02 0700 12/02 0701 12/03 0700    P.O.   240 720 960 240    Total Intake(mL/kg)   240 (2.7) 720 (8.3) 960 (11.3) 240 (2.8)    Urine (mL/kg/hr)  500 1925 (0.9) 1300 (0.6) 2200 (1.1)     Total Output  500 1925 1300 2200     Net  -500 -1685 -580 -1240 +240              Urine Unmeasured Occurrence     1 x           Lines, Drains & Airways       Active LDAs       Name Placement date Placement time Site Days    Peripheral IV 11/28/24 1953 Right Antecubital 11/28/24 1953  Antecubital  3    Peripheral IV 11/28/24 2051 Left Antecubital 11/28/24 2051  Antecubital  3              Inactive LDAs       Name Placement date Placement time Removal date Removal time Site Days    [REMOVED] Urethral Catheter Silicone 16 Fr. 08/30/24  1805  11/29/24  1305  -- 90                Physician Progress Notes (all)        Bhaskar Guillermo MD at 12/02/24 0743          ID note for pyelonephritis/fungemia  Subjective: had some pain overnight that resolved. No vision complaints. AF.    Physical Exam:   Vital Signs   Temp:  [97.7 °F (36.5 °C)-98.8 °F (37.1 °C)] 97.7 °F (36.5 °C)  Heart Rate:  [83-96] 96  Resp:  [16-18] 18  BP: (151-195)/(74-93) 151/85    GENERAL: Awake and alert, in no acute distress.   HEENT: Oropharynx is clear. Hearing is grossly normal.   EYES: . No conjunctival injection. No lid lag.   LUNGS:normal respiratory effort.   SKIN: no cutaneous eruptions in exposed areas  PSYCHIATRIC: Appropriate mood, affect, insight, and judgment.     Results Review:  Wbc 11.1  Cr 11.19  Glc 158-214  11/28 Bcx 1/2 c glabrata  12/1 Bcx 2/2 ngtd    A/p  1.  Sepsis secondary #2  2.  Candida glabrata fungemia and pyelonephritis  3.  Diabetes mellitus type 2, continue glycemic control efforts to prevent/control infectious  "complications    Continue micafungin 100 mg IV every 24 hours. Repeat bcx and tte pending. If okay, likely dc on high dose fluconazole 800mg po q24 to complete two weeks of therapy        Thank you for this consult.  We will continue to follow along and tailor antibiotics as the patient's clinical course evolves.              Electronically signed by Bhaskar Guillermo MD at 24 0823       Kraig Lao MD at 24 1227          DAILY PROGRESS NOTE  [facility]    Patient Identification:  Name: Maurizio Dejesus  Age: 59 y.o.  Sex: male  :  1965  MRN: [id number]         Primary Care Physician: Ta Estevez MD    Subjective:  Interval History: He complains of some right-sided abdominal pain but is feeling much better today.    Objective:    Scheduled Meds:insulin lispro, 2-7 Units, Subcutaneous, 4x Daily AC & at Bedtime  micafungin (MYCAMINE) IV, 100 mg, Intravenous, Q24H  rosuvastatin, 20 mg, Oral, Daily  tamsulosin, 0.4 mg, Oral, Daily      Continuous Infusions:       Vital signs in last 24 hours:  Temp:  [97.9 °F (36.6 °C)-99 °F (37.2 °C)] 98.8 °F (37.1 °C)  Heart Rate:  [89-91] 91  Resp:  [16-18] 17  BP: (146-169)/(77-88) 165/77    Intake/Output:    Intake/Output Summary (Last 24 hours) at 2024 1227  Last data filed at 2024 1112  Gross per 24 hour   Intake 720 ml   Output 1600 ml   Net -880 ml       Exam:  /77 (BP Location: Right arm, Patient Position: Sitting)   Pulse 91   Temp 98.8 °F (37.1 °C) (Oral)   Resp 17   Ht 182.9 cm (72\")   Wt 86.3 kg (190 lb 3.2 oz)   SpO2 97%   BMI 25.80 kg/m²     General Appearance:    Alert, cooperative, no distress   Head:    Normocephalic, without obvious abnormality, atraumatic   Eyes:       Throat:   Lips, tongue, gums normal   Neck:   Supple, symmetrical, trachea midline, no JVD   Lungs:     Clear to auscultation bilaterally, respirations unlabored   Chest Wall:    No tenderness or deformity    Heart:    " Regular rate and rhythm, S1 and S2 normal, no murmur,no  rub or gallop   Abdomen:     Soft, nontender, bowel sounds active, no masses, no organomegaly    Extremities:   Extremities normal, atraumatic, no cyanosis or edema   Pulses:      Skin:   Skin is warm and dry,  no rashes or palpable lesions   Neurologic:   no focal deficits noted      Lab Results (last 72 hours)       Procedure Component Value Units Date/Time    POC Glucose Once [[id number]]  (Abnormal) Collected: 11/29/24 1115    Specimen: Blood Updated: 11/29/24 1117     Glucose 183 mg/dL     Urine Culture - Urine, Urine, Clean Catch [[id number]]  (Normal) Collected: 11/28/24 2016    Specimen: Urine, Clean Catch Updated: 11/29/24 1021     Urine Culture No growth    Basic Metabolic Panel [[id number]]  (Abnormal) Collected: 11/29/24 0542    Specimen: Blood Updated: 11/29/24 0702     Glucose 135 mg/dL      BUN 22 mg/dL      Creatinine 1.00 mg/dL      Sodium 138 mmol/L      Potassium 3.7 mmol/L      Chloride 106 mmol/L      CO2 21.0 mmol/L      Calcium 8.2 mg/dL      BUN/Creatinine Ratio 22.0     Anion Gap 11.0 mmol/L      eGFR 86.7 mL/min/1.73     Narrative:      GFR Normal >60  Chronic Kidney Disease <60  Kidney Failure <15      POC Glucose Once [[id number]]  (Normal) Collected: 11/29/24 0658    Specimen: Blood Updated: 11/29/24 0700     Glucose 116 mg/dL     Hemoglobin A1c [[id number]]  (Abnormal) Collected: 11/29/24 0542    Specimen: Blood Updated: 11/29/24 0648     Hemoglobin A1C 8.60 %     Narrative:      Hemoglobin A1C Ranges:    Increased Risk for Diabetes  5.7% to 6.4%  Diabetes                     >= 6.5%  Diabetic Goal                < 7.0%    CBC (No Diff) [[id number]]  (Abnormal) Collected: 11/29/24 0542    Specimen: Blood Updated: 11/29/24 0641     WBC 9.72 10*3/mm3      RBC 4.42 10*6/mm3      Hemoglobin 13.3 g/dL      Hematocrit 38.6 %      MCV 87.3 fL      MCH 30.1 pg      MCHC 34.5 g/dL      RDW 12.2 %      RDW-SD 38.8 fl      MPV 9.6 fL       Platelets 166 10*3/mm3     STAT Lactic Acid, Reflex [[id number]]  (Normal) Collected: 11/28/24 2255    Specimen: Blood Updated: 11/28/24 2315     Lactate 1.0 mmol/L     Ketone Bodies, Serum (Not performed at [place]) [[id number]]  (Normal) Collected: 11/28/24 1952    Specimen: Blood Updated: 11/28/24 2309    Narrative:      The following orders were created for panel order Ketone Bodies, Serum (Not performed at [place]).  Procedure                               Abnormality         Status                     ---------                               -----------         ------                     Beta Hydroxybutyrate [place]...[[id number]]  Normal              Final result                 Please view results for these tests on the individual orders.    Beta Hydroxybutyrate Quantitative [[id number]]  (Normal) Collected: 11/28/24 1952    Specimen: Blood Updated: 11/28/24 2309     Beta-Hydroxybutyrate Quant 0.155 mmol/L     Narrative:      In the assessment of possible diabetic ketoacidosis, the test should be interpreted along with other clinical and laboratory findings.  A level greater than 1 mmol/L should require further evaluation and levels of more than 3 mmol/L require immediate medical review.    POC Glucose Once [[id number]]  (Abnormal) Collected: 11/28/24 2222    Specimen: Blood Updated: 11/28/24 2233     Glucose 177 mg/dL     Procalcitonin [[id number]]  (Abnormal) Collected: 11/28/24 1952    Specimen: Blood Updated: 11/28/24 2233     Procalcitonin 0.75 ng/mL     Narrative:      As a Marker for Sepsis (Non-Neonates):    1. <0.5 ng/mL represents a low risk of severe sepsis and/or septic shock.  2. >2 ng/mL represents a high risk of severe sepsis and/or septic shock.    As a Marker for Lower Respiratory Tract Infections that require antibiotic therapy:    PCT on Admission    Antibiotic Therapy       6-12 Hrs later    >0.5                Strongly Recommended  >0.25 - <0.5        Recommended   0.1 - 0.25          Discouraged  "             Remeasure/reassess PCT  <0.1                Strongly Discouraged     Remeasure/reassess PCT    As 28 day mortality risk marker: \"Change in Procalcitonin Result\" (>80% or <=80%) if Day 0 (or Day 1) and Day 4 values are available. Refer to http://[email or web address]-pct-calculator.[email or web address]    Change in PCT <=80%  A decrease of PCT levels below or equal to 80% defines a positive change in PCT test result representing a higher risk for 28-day all-cause mortality of patients diagnosed with severe sepsis for septic shock.    Change in PCT >80%  A decrease of PCT levels of more than 80% defines a negative change in PCT result representing a lower risk for 28-day all-cause mortality of patients diagnosed with severe sepsis or septic shock.       C-reactive Protein [[id number]]  (Abnormal) Collected: 11/28/24 1952    Specimen: Blood Updated: 11/28/24 2225     C-Reactive Protein 10.56 mg/dL     Urinalysis, Microscopic Only - Urine, Clean Catch [[id number]]  (Abnormal) Collected: 11/28/24 2016    Specimen: Urine, Clean Catch Updated: 11/28/24 2159     RBC, UA Too Numerous to Count /HPF      WBC, UA 6-10 /HPF      Bacteria, UA None Seen /HPF      Squamous Epithelial Cells, UA 0-2 /HPF      Yeast, UA Present /HPF      Hyaline Casts, UA 3-6 /LPF      Methodology Automated Microscopy    [place] Urine Culture Tube - Urine, Clean Catch [[id number]] Collected: 11/28/24 2016    Specimen: Urine, Clean Catch Updated: 11/28/24 2149     Extra Tube Hold for add-ons.     Comment: Auto resulted.       Blood Gas, Venous - [[id number]]  (Abnormal) Collected: 11/28/24 2126    Specimen: Venous Blood Updated: 11/28/24 2128     pH, Venous 7.403 pH Units      pCO2, Venous 36.3 mm Hg      pO2, Venous 44.8 mm Hg      HCO3, Venous 22.6 mmol/L      Base Excess, Venous -1.7 mmol/L      Comment: Serial Number: [id number]:  [id number]        O2 Saturation, Venous 81.0 %      CO2 Content 23.7 mmol/L      Barometric Pressure for Blood Gas 745.8000 mmHg      " Modality Room Air    Magnesium [[id number]]  (Normal) Collected: 11/28/24 1953    Specimen: Blood Updated: 11/28/24 2027     Magnesium 2.1 mg/dL     Comprehensive Metabolic Panel [[id number]]  (Abnormal) Collected: 11/28/24 1953    Specimen: Blood Updated: 11/28/24 2027     Glucose 415 mg/dL      BUN 31 mg/dL      Creatinine 1.31 mg/dL      Sodium 133 mmol/L      Potassium 4.0 mmol/L      Chloride 98 mmol/L      CO2 24.7 mmol/L      Calcium 9.2 mg/dL      Total Protein 6.9 g/dL      Albumin 4.0 g/dL      ALT (SGPT) 17 U/L      AST (SGOT) 16 U/L      Alkaline Phosphatase 80 U/L      Total Bilirubin 0.8 mg/dL      Globulin 2.9 gm/dL      A/G Ratio 1.4 g/dL      BUN/Creatinine Ratio 23.7     Anion Gap 10.3 mmol/L      eGFR 62.7 mL/min/1.73     Narrative:      GFR Normal >60  Chronic Kidney Disease <60  Kidney Failure <15      COVID-19 and FLU A/B PCR, 1 HR TAT - Swab, Nasopharynx [[id number]]  (Normal) Collected: 11/28/24 2002    Specimen: Swab from Nasopharynx Updated: 11/28/24 2027     COVID19 Not Detected     Influenza A PCR Not Detected     Influenza B PCR Not Detected    Narrative:      Fact sheet for providers: https://www.fda.gov/media/788469/download    Fact sheet for patients: https://www.fda.gov/media/324918/download    Test performed by PCR.    Lipase [[id number]]  (Normal) Collected: 11/28/24 1953    Specimen: Blood Updated: 11/28/24 2025     Lipase 25 U/L     Rapid Strep A Screen - Swab, Throat [[id number]]  (Normal) Collected: 11/28/24 2002    Specimen: Swab from Throat Updated: 11/28/24 2023     STREP A PCR Not Detected    Lactic Acid, Plasma [[id number]]  (Abnormal) Collected: 11/28/24 1953    Specimen: Blood Updated: 11/28/24 2022     Lactate 2.1 mmol/L     Urinalysis With Culture If Indicated - Urine, Clean Catch [[id number]]  (Abnormal) Collected: 11/28/24 2016    Specimen: Urine, Clean Catch Updated: 11/28/24 2021     Color, UA [place]     Appearance, UA Clear     pH, UA <=5.0     Specific Gravity, UA  <=1.005     Glucose, UA >=1000 mg/dL (3+)     Ketones, UA Negative     Bilirubin, UA Negative     Blood, UA Large (3+)     Protein,  mg/dL (2+)     Leuk Esterase, UA Negative     Nitrite, UA Positive     Urobilinogen, UA 0.2 E.U./dL    Narrative:      In absence of clinical symptoms, the presence of pyuria, bacteria, and/or nitrites on the urinalysis result does not correlate with infection.    [place] Draw [[id number]] Collected: 11/28/24 1952    Specimen: Blood Updated: 11/28/24 2015    Narrative:      The following orders were created for panel order [place] Draw.  Procedure                               Abnormality         Status                     ---------                               -----------         ------                     Gold Top - [facility][[id number]]                                   Final result               Light Blue Top[[id number]]                                   Final result                 Please view results for these tests on the individual orders.    [facility] - [facility] [[id number]] Collected: 11/28/24 1952    Specimen: Blood Updated: 11/28/24 2015     Extra Tube Hold for add-ons.     Comment: Auto resulted.       Light Blue Top [[id number]] Collected: 11/28/24 1952    Specimen: Blood Updated: 11/28/24 2015     Extra Tube Hold for add-ons.     Comment: Auto resulted       Blood Culture - Blood, Arm, Left [[id number]] Collected: 11/28/24 2003    Specimen: Blood from Arm, Left Updated: 11/28/24 2006    Blood Culture - Blood, Arm, Right [[id number]] Collected: 11/28/24 2003    Specimen: Blood from Arm, Right Updated: 11/28/24 2006    CBC & Differential [[id number]]  (Abnormal) Collected: 11/28/24 1953    Specimen: Blood Updated: 11/28/24 2001    Narrative:      The following orders were created for panel order CBC & Differential.  Procedure                               Abnormality         Status                     ---------                               -----------         ------                    "  CBC Auto Differential[[id number]]        Abnormal            Final result                 Please view results for these tests on the individual orders.    CBC Auto Differential [[id number]]  (Abnormal) Collected: 11/28/24 1953    Specimen: Blood Updated: 11/28/24 2001     WBC 8.40 10*3/mm3      RBC 5.08 10*6/mm3      Hemoglobin 15.2 g/dL      Hematocrit 45.0 %      MCV 88.6 fL      MCH 29.9 pg      MCHC 33.8 g/dL      RDW 12.3 %      RDW-SD 40.5 fl      MPV 9.5 fL      Platelets 165 10*3/mm3      Neutrophil % 75.9 %      Lymphocyte % 8.3 %      Monocyte % 9.9 %      Eosinophil % 5.6 %      Basophil % 0.2 %      Immature Grans % 0.1 %      Neutrophils, Absolute 6.37 10*3/mm3      Lymphocytes, Absolute 0.70 10*3/mm3      Monocytes, Absolute 0.83 10*3/mm3      Eosinophils, Absolute 0.47 10*3/mm3      Basophils, Absolute 0.02 10*3/mm3      Immature Grans, Absolute 0.01 10*3/mm3           Data Review:  Results from last 7 days   Lab Units 12/01/24  0751 11/30/24  0659 11/29/24  0542   SODIUM mmol/L 136 139 138   POTASSIUM mmol/L 4.5 4.0 3.7   CHLORIDE mmol/L 103 102 106   CO2 mmol/L 7.8* 14.7* 21.0*   BUN mg/dL 21* 15 22*   CREATININE mg/dL 1.25 1.09 1.00   GLUCOSE mg/dL 182* 115* 135*   CALCIUM mg/dL 9.2 8.9 8.2*     Results from last 7 days   Lab Units 12/01/24  0751 11/30/24  0659 11/29/24  0542   WBC 10*3/mm3 11.53* 10.10 9.72   HEMOGLOBIN g/dL 14.6 14.9 13.3   HEMATOCRIT % 43.0 44.2 38.6   PLATELETS 10*3/mm3 208 168 166         Results from last 7 days   Lab Units 11/29/24  0542   HEMOGLOBIN A1C % 8.60*     No results found for: \"TROPONINT\"      Results from last 7 days   Lab Units 11/28/24 1953   ALK PHOS U/L 80   BILIRUBIN mg/dL 0.8   ALT (SGPT) U/L 17   AST (SGOT) U/L 16         Results from last 7 days   Lab Units 11/29/24  0542   HEMOGLOBIN A1C % 8.60*     Glucose   Date/Time Value Ref Range Status   12/01/2024 1109 214 (H) 70 - 130 mg/dL Final   12/01/2024 0604 174 (H) 70 - 130 mg/dL Final   11/30/2024 2107 " 136 (H) 70 - 130 mg/dL Final   11/30/2024 1628 132 (H) 70 - 130 mg/dL Final   11/30/2024 1113 152 (H) 70 - 130 mg/dL Final   11/30/2024 0608 106 70 - 130 mg/dL Final   11/29/2024 2031 124 70 - 130 mg/dL Final   11/29/2024 1606 151 (H) 70 - 130 mg/dL Final           Past Medical History:   Diagnosis Date    Acute UTI (urinary tract infection) 11/29/2024    Diabetes mellitus     Elevated PSA     Erectile dysfunction     Hormone disorder     i've been taking testosterone supplements    Kidney stone     Kidney stone 08/23/2022       Assessment:  Active Hospital Problems    Diagnosis  POA    **Sepsis [A41.9]  Yes    Acute UTI (urinary tract infection) [N39.0]  Yes    Type 2 diabetes mellitus with hyperglycemia, without long-term current use of insulin [E11.65]  Yes    BEAR (obstructive sleep apnea) [G47.33]  Yes      Resolved Hospital Problems   No resolved problems to display.       Plan:  Continue with antibiotics for UTI and await cultures.   ID consult noted.  Continue micafungin.   urology consult noted.  Follow-up on labs and cultures.    Kraig Lao MD  12/1/2024  12:27 EST     Electronically signed by Kraig Lao MD at 12/01/24 1425       Bhaskar Guillermo MD at 12/01/24 0806          ID note for pyelonephritis/fungemia  Subjective: He is feeling okay.  Denies any pain.  Afebrile.  Had some restlessness yesterday unclear if related to any medication.    Physical Exam:   Vital Signs   Temp:  [97.9 °F (36.6 °C)-99 °F (37.2 °C)] 98.8 °F (37.1 °C)  Heart Rate:  [89-91] 91  Resp:  [16-18] 17  BP: (146-169)/(77-88) 165/77    GENERAL: Awake and alert, in no acute distress.   HEENT: Oropharynx is clear. Hearing is grossly normal.   EYES: . No conjunctival injection. No lid lag.   LUNGS:normal respiratory effort.   SKIN: no cutaneous eruptions in exposed areas  PSYCHIATRIC: Appropriate mood, affect, insight, and judgment.     Results Review:  Glucose 132-174  11/28 Bcx 1/2 c glabrata    A/p  1.  Sepsis  "secondary #2  2.  Candida glabrata fungemia and pyelonephritis  3.  Diabetes mellitus type 2, continue glycemic control efforts to prevent/control infectious complications    Continue micafungin 100 mg IV every 24 hours.  Check repeat blood cultures today along with TTE but suspect urinary source  May be able to de-escalate to oral antifungals to complete 2 weeks of therapy if sensitivities allow  Stopped Rocephin      Thank you for this consult.  We will continue to follow along and tailor antibiotics as the patient's clinical course evolves.              Electronically signed by Bhaskar Guillermo MD at 24 0807       Kraig Lao MD at 24 1158          DAILY PROGRESS NOTE  [facility]    Patient Identification:  Name: Maurizio Dejesus  Age: 59 y.o.  Sex: male  :  1965  MRN: [id number]         Primary Care Physician: Ta Estevez MD    Subjective:  Interval History: He complains of some right-sided abdominal pain but is feeling much better today.    Objective:    Scheduled Meds:cefTRIAXone, 1,000 mg, Intravenous, Q24H  fluconazole, 200 mg, Oral, Q24H  insulin lispro, 2-7 Units, Subcutaneous, 4x Daily AC & at Bedtime  phenazopyridine, 200 mg, Oral, TID With Meals  rosuvastatin, 20 mg, Oral, Daily  tamsulosin, 0.4 mg, Oral, Daily      Continuous Infusions:       Vital signs in last 24 hours:  Temp:  [97.9 °F (36.6 °C)-99.1 °F (37.3 °C)] 99.1 °F (37.3 °C)  Heart Rate:  [] 98  Resp:  [17-18] 17  BP: (139-177)/(77-86) 177/86    Intake/Output:    Intake/Output Summary (Last 24 hours) at 2024 1158  Last data filed at 2024 0951  Gross per 24 hour   Intake 480 ml   Output 2025 ml   Net -1545 ml       Exam:  /86 (BP Location: Right arm, Patient Position: Lying)   Pulse 98   Temp 99.1 °F (37.3 °C) (Oral)   Resp 17   Ht 182.9 cm (72\")   Wt 90.3 kg (199 lb 1.6 oz)   SpO2 98%   BMI 27.00 kg/m²     General Appearance:    Alert, cooperative, no " distress   Head:    Normocephalic, without obvious abnormality, atraumatic   Eyes:       Throat:   Lips, tongue, gums normal   Neck:   Supple, symmetrical, trachea midline, no JVD   Lungs:     Clear to auscultation bilaterally, respirations unlabored   Chest Wall:    No tenderness or deformity    Heart:    Regular rate and rhythm, S1 and S2 normal, no murmur,no  rub or gallop   Abdomen:     Soft, nontender, bowel sounds active, no masses, no organomegaly    Extremities:   Extremities normal, atraumatic, no cyanosis or edema   Pulses:      Skin:   Skin is warm and dry,  no rashes or palpable lesions   Neurologic:   no focal deficits noted      Lab Results (last 72 hours)       Procedure Component Value Units Date/Time    POC Glucose Once [[id number]]  (Abnormal) Collected: 11/29/24 1115    Specimen: Blood Updated: 11/29/24 1117     Glucose 183 mg/dL     Urine Culture - Urine, Urine, Clean Catch [[id number]]  (Normal) Collected: 11/28/24 2016    Specimen: Urine, Clean Catch Updated: 11/29/24 1021     Urine Culture No growth    Basic Metabolic Panel [[id number]]  (Abnormal) Collected: 11/29/24 0542    Specimen: Blood Updated: 11/29/24 0702     Glucose 135 mg/dL      BUN 22 mg/dL      Creatinine 1.00 mg/dL      Sodium 138 mmol/L      Potassium 3.7 mmol/L      Chloride 106 mmol/L      CO2 21.0 mmol/L      Calcium 8.2 mg/dL      BUN/Creatinine Ratio 22.0     Anion Gap 11.0 mmol/L      eGFR 86.7 mL/min/1.73     Narrative:      GFR Normal >60  Chronic Kidney Disease <60  Kidney Failure <15      POC Glucose Once [[id number]]  (Normal) Collected: 11/29/24 0658    Specimen: Blood Updated: 11/29/24 0700     Glucose 116 mg/dL     Hemoglobin A1c [[id number]]  (Abnormal) Collected: 11/29/24 0542    Specimen: Blood Updated: 11/29/24 0648     Hemoglobin A1C 8.60 %     Narrative:      Hemoglobin A1C Ranges:    Increased Risk for Diabetes  5.7% to 6.4%  Diabetes                     >= 6.5%  Diabetic Goal                < 7.0%    CBC (No  Diff) [[id number]]  (Abnormal) Collected: 11/29/24 0542    Specimen: Blood Updated: 11/29/24 0641     WBC 9.72 10*3/mm3      RBC 4.42 10*6/mm3      Hemoglobin 13.3 g/dL      Hematocrit 38.6 %      MCV 87.3 fL      MCH 30.1 pg      MCHC 34.5 g/dL      RDW 12.2 %      RDW-SD 38.8 fl      MPV 9.6 fL      Platelets 166 10*3/mm3     STAT Lactic Acid, Reflex [[id number]]  (Normal) Collected: 11/28/24 2255    Specimen: Blood Updated: 11/28/24 2315     Lactate 1.0 mmol/L     Ketone Bodies, Serum (Not performed at [facility]) [[id number]]  (Normal) Collected: 11/28/24 1952    Specimen: Blood Updated: 11/28/24 2309    Narrative:      The following orders were created for panel order Ketone Bodies, Serum (Not performed at [facility]).  Procedure                               Abnormality         Status                     ---------                               -----------         ------                     Beta Hydroxybutyrate [place]...[[id number]]  Normal              Final result                 Please view results for these tests on the individual orders.    Beta Hydroxybutyrate Quantitative [[id number]]  (Normal) Collected: 11/28/24 1952    Specimen: Blood Updated: 11/28/24 2309     Beta-Hydroxybutyrate Quant 0.155 mmol/L     Narrative:      In the assessment of possible diabetic ketoacidosis, the test should be interpreted along with other clinical and laboratory findings.  A level greater than 1 mmol/L should require further evaluation and levels of more than 3 mmol/L require immediate medical review.    POC Glucose Once [[id number]]  (Abnormal) Collected: 11/28/24 2222    Specimen: Blood Updated: 11/28/24 2233     Glucose 177 mg/dL     Procalcitonin [[id number]]  (Abnormal) Collected: 11/28/24 1952    Specimen: Blood Updated: 11/28/24 2233     Procalcitonin 0.75 ng/mL     Narrative:      As a Marker for Sepsis (Non-Neonates):    1. <0.5 ng/mL represents a low risk of severe sepsis and/or septic shock.  2. >2 ng/mL represents a high  "risk of severe sepsis and/or septic shock.    As a Marker for Lower Respiratory Tract Infections that require antibiotic therapy:    PCT on Admission    Antibiotic Therapy       6-12 Hrs later    >0.5                Strongly Recommended  >0.25 - <0.5        Recommended   0.1 - 0.25          Discouraged              Remeasure/reassess PCT  <0.1                Strongly Discouraged     Remeasure/reassess PCT    As 28 day mortality risk marker: \"Change in Procalcitonin Result\" (>80% or <=80%) if Day 0 (or Day 1) and Day 4 values are available. Refer to [email or web address]    Change in PCT <=80%  A decrease of PCT levels below or equal to 80% defines a positive change in PCT test result representing a higher risk for 28-day all-cause mortality of patients diagnosed with severe sepsis for septic shock.    Change in PCT >80%  A decrease of PCT levels of more than 80% defines a negative change in PCT result representing a lower risk for 28-day all-cause mortality of patients diagnosed with severe sepsis or septic shock.       C-reactive Protein [[id number]]  (Abnormal) Collected: 11/28/24 1952    Specimen: Blood Updated: 11/28/24 2225     C-Reactive Protein 10.56 mg/dL     Urinalysis, Microscopic Only - Urine, Clean Catch [[id number]]  (Abnormal) Collected: 11/28/24 2016    Specimen: Urine, Clean Catch Updated: 11/28/24 2159     RBC, UA Too Numerous to Count /HPF      WBC, UA 6-10 /HPF      Bacteria, UA None Seen /HPF      Squamous Epithelial Cells, UA 0-2 /HPF      Yeast, UA Present /HPF      Hyaline Casts, UA 3-6 /LPF      Methodology Automated Microscopy    [place] Urine Culture Tube - Urine, Clean Catch [[id number]] Collected: 11/28/24 2016    Specimen: Urine, Clean Catch Updated: 11/28/24 2149     Extra Tube Hold for add-ons.     Comment: Auto resulted.       Blood Gas, Venous - [[id number]]  (Abnormal) Collected: 11/28/24 2126    Specimen: Venous Blood Updated: 11/28/24 2128     pH, Venous 7.403 pH Units  "     pCO2, Venous 36.3 mm Hg      pO2, Venous 44.8 mm Hg      HCO3, Venous 22.6 mmol/L      Base Excess, Venous -1.7 mmol/L      Comment: Serial Number: [id number]:  [id number]        O2 Saturation, Venous 81.0 %      CO2 Content 23.7 mmol/L      Barometric Pressure for Blood Gas 745.8000 mmHg      Modality Room Air    Magnesium [[id number]]  (Normal) Collected: 11/28/24 1953    Specimen: Blood Updated: 11/28/24 2027     Magnesium 2.1 mg/dL     Comprehensive Metabolic Panel [[id number]]  (Abnormal) Collected: 11/28/24 1953    Specimen: Blood Updated: 11/28/24 2027     Glucose 415 mg/dL      BUN 31 mg/dL      Creatinine 1.31 mg/dL      Sodium 133 mmol/L      Potassium 4.0 mmol/L      Chloride 98 mmol/L      CO2 24.7 mmol/L      Calcium 9.2 mg/dL      Total Protein 6.9 g/dL      Albumin 4.0 g/dL      ALT (SGPT) 17 U/L      AST (SGOT) 16 U/L      Alkaline Phosphatase 80 U/L      Total Bilirubin 0.8 mg/dL      Globulin 2.9 gm/dL      A/G Ratio 1.4 g/dL      BUN/Creatinine Ratio 23.7     Anion Gap 10.3 mmol/L      eGFR 62.7 mL/min/1.73     Narrative:      GFR Normal >60  Chronic Kidney Disease <60  Kidney Failure <15      COVID-19 and FLU A/B PCR, 1 HR TAT - Swab, Nasopharynx [[id number]]  (Normal) Collected: 11/28/24 2002    Specimen: Swab from Nasopharynx Updated: 11/28/24 2027     COVID19 Not Detected     Influenza A PCR Not Detected     Influenza B PCR Not Detected    Narrative:      Fact sheet for providers: https://www.fda.gov/media/075968/download    Fact sheet for patients: https://www.fda.gov/media/434533/download    Test performed by PCR.    Lipase [[id number]]  (Normal) Collected: 11/28/24 1953    Specimen: Blood Updated: 11/28/24 2025     Lipase 25 U/L     Rapid Strep A Screen - Swab, Throat [[id number]]  (Normal) Collected: 11/28/24 2002    Specimen: Swab from Throat Updated: 11/28/24 2023     STREP A PCR Not Detected    Lactic Acid, Plasma [[id number]]  (Abnormal) Collected: 11/28/24 1953    Specimen: Blood  Updated: 11/28/24 2022     Lactate 2.1 mmol/L     Urinalysis With Culture If Indicated - Urine, Clean Catch [[id number]]  (Abnormal) Collected: 11/28/24 2016    Specimen: Urine, Clean Catch Updated: 11/28/24 2021     Color, UA [place]     Appearance, UA Clear     pH, UA <=5.0     Specific Gravity, UA <=1.005     Glucose, UA >=1000 mg/dL (3+)     Ketones, UA Negative     Bilirubin, UA Negative     Blood, UA Large (3+)     Protein,  mg/dL (2+)     Leuk Esterase, UA Negative     Nitrite, UA Positive     Urobilinogen, UA 0.2 E.U./dL    Narrative:      In absence of clinical symptoms, the presence of pyuria, bacteria, and/or nitrites on the urinalysis result does not correlate with infection.    [place] Draw [[id number]] Collected: 11/28/24 1952    Specimen: Blood Updated: 11/28/24 2015    Narrative:      The following orders were created for panel order [place] Draw.  Procedure                               Abnormality         Status                     ---------                               -----------         ------                     Gold Top - SST[[id number]]                                   Final result               Light Blue Top[[id number]]                                   Final result                 Please view results for these tests on the individual orders.    Gold Top - SST [[id number]] Collected: 11/28/24 1952    Specimen: Blood Updated: 11/28/24 2015     Extra Tube Hold for add-ons.     Comment: Auto resulted.       Light Blue Top [[id number]] Collected: 11/28/24 1952    Specimen: Blood Updated: 11/28/24 2015     Extra Tube Hold for add-ons.     Comment: Auto resulted       Blood Culture - Blood, Arm, Left [[id number]] Collected: 11/28/24 2003    Specimen: Blood from Arm, Left Updated: 11/28/24 2006    Blood Culture - Blood, Arm, Right [[id number]] Collected: 11/28/24 2003    Specimen: Blood from Arm, Right Updated: 11/28/24 2006    CBC & Differential [[id number]]  (Abnormal) Collected: 11/28/24 1953     "Specimen: Blood Updated: 11/28/24 2001    Narrative:      The following orders were created for panel order CBC & Differential.  Procedure                               Abnormality         Status                     ---------                               -----------         ------                     CBC Auto Differential[[id number]]        Abnormal            Final result                 Please view results for these tests on the individual orders.    CBC Auto Differential [[id number]]  (Abnormal) Collected: 11/28/24 1953    Specimen: Blood Updated: 11/28/24 2001     WBC 8.40 10*3/mm3      RBC 5.08 10*6/mm3      Hemoglobin 15.2 g/dL      Hematocrit 45.0 %      MCV 88.6 fL      MCH 29.9 pg      MCHC 33.8 g/dL      RDW 12.3 %      RDW-SD 40.5 fl      MPV 9.5 fL      Platelets 165 10*3/mm3      Neutrophil % 75.9 %      Lymphocyte % 8.3 %      Monocyte % 9.9 %      Eosinophil % 5.6 %      Basophil % 0.2 %      Immature Grans % 0.1 %      Neutrophils, Absolute 6.37 10*3/mm3      Lymphocytes, Absolute 0.70 10*3/mm3      Monocytes, Absolute 0.83 10*3/mm3      Eosinophils, Absolute 0.47 10*3/mm3      Basophils, Absolute 0.02 10*3/mm3      Immature Grans, Absolute 0.01 10*3/mm3           Data Review:  Results from last 7 days   Lab Units 11/30/24 0659 11/29/24 0542 11/28/24 1953   SODIUM mmol/L 139 138 133*   POTASSIUM mmol/L 4.0 3.7 4.0   CHLORIDE mmol/L 102 106 98   CO2 mmol/L 14.7* 21.0* 24.7   BUN mg/dL 15 22* 31*   CREATININE mg/dL 1.09 1.00 1.31*   GLUCOSE mg/dL 115* 135* 415*   CALCIUM mg/dL 8.9 8.2* 9.2     Results from last 7 days   Lab Units 11/30/24 0659 11/29/24 0542 11/28/24 1953   WBC 10*3/mm3 10.10 9.72 8.40   HEMOGLOBIN g/dL 14.9 13.3 15.2   HEMATOCRIT % 44.2 38.6 45.0   PLATELETS 10*3/mm3 168 166 165         Results from last 7 days   Lab Units 11/29/24 0542   HEMOGLOBIN A1C % 8.60*     No results found for: \"TROPONINT\"      Results from last 7 days   Lab Units 11/28/24 1953   ALK PHOS U/L 80 "   BILIRUBIN mg/dL 0.8   ALT (SGPT) U/L 17   AST (SGOT) U/L 16         Results from last 7 days   Lab Units 11/29/24  0542   HEMOGLOBIN A1C % 8.60*     Glucose   Date/Time Value Ref Range Status   11/30/2024 1113 152 (H) 70 - 130 mg/dL Final   11/30/2024 0608 106 70 - 130 mg/dL Final   11/29/2024 2031 124 70 - 130 mg/dL Final   11/29/2024 1606 151 (H) 70 - 130 mg/dL Final   11/29/2024 1115 183 (H) 70 - 130 mg/dL Final   11/29/2024 0658 116 70 - 130 mg/dL Final   11/28/2024 2222 177 (H) 70 - 130 mg/dL Final           Past Medical History:   Diagnosis Date    Acute UTI (urinary tract infection) 11/29/2024    Diabetes mellitus     Elevated PSA     Erectile dysfunction     Hormone disorder     i've been taking testosterone supplements    Kidney stone     Kidney stone 08/23/2022       Assessment:  Active Hospital Problems    Diagnosis  POA    **Sepsis [A41.9]  Yes    Acute UTI (urinary tract infection) [N39.0]  Yes    Type 2 diabetes mellitus with hyperglycemia, without long-term current use of insulin [E11.65]  Yes    BEAR (obstructive sleep apnea) [G47.33]  Yes      Resolved Hospital Problems   No resolved problems to display.       Plan:  Continue with antibiotics for UTI and await cultures.  Candida growing in blood cultures and will ask for ID consult.  Changed to micafungin.   urology consult noted.  Follow-up on labs and cultures.    Kraig Lao MD  11/30/2024  11:58 EST     Electronically signed by Kraig Lao MD at 11/30/24 1402       Fauzia Leary APRN at 11/30/24 0847       Attestation signed by Turner Haynes MD at 11/30/24 1419    I have reviewed this documentation and agree.                  Pt sitting in bed with family at bedside     He is eager to go home     Right peylo   -S/p right URS, LL SBE and stent change on 11/27/2024 with Dr. Diaz     -Urine culture neg from 11/28  -He is on Rocpehin and Diflucan  -Had been on Keflex prior to admission     - no pain   - voiding well     AM  "labs:  WBC 10.10  Cr 1.09    Plan:   Pt and family understand stent will not be removed Monday as planned but 1-2 more week- office with contact pt   Rec. Home on antibiotics and pyrdium     Electronically signed by Turner Haynes MD at 24 1419       Kraig Lao MD at 24 1223          DAILY PROGRESS NOTE  [facility]    Patient Identification:  Name: Maurizio Dejesus  Age: 59 y.o.  Sex: male  :  1965  MRN: [id number]         Primary Care Physician: Ta Estevez MD    Subjective:  Interval History: He complains of some right-sided abdominal pain but is feeling much better today.    Objective:    Scheduled Meds:cefTRIAXone, 1,000 mg, Intravenous, Q24H  insulin lispro, 2-7 Units, Subcutaneous, 4x Daily AC & at Bedtime  rosuvastatin, 20 mg, Oral, Daily      Continuous Infusions:sodium chloride, 100 mL/hr, Last Rate: 100 mL/hr (24 0339)        Vital signs in last 24 hours:  Temp:  [97.7 °F (36.5 °C)-103.1 °F (39.5 °C)] 97.7 °F (36.5 °C)  Heart Rate:  [] 78  Resp:  [18-20] 18  BP: (110-168)/(56-81) 122/74    Intake/Output:    Intake/Output Summary (Last 24 hours) at 2024 1223  Last data filed at 2024 2342  Gross per 24 hour   Intake --   Output 500 ml   Net -500 ml       Exam:  /74 (BP Location: Left arm, Patient Position: Lying)   Pulse 78   Temp 97.7 °F (36.5 °C) (Oral)   Resp 18   Ht 182.9 cm (72\")   Wt 92.3 kg (203 lb 6.4 oz)   SpO2 96%   BMI 27.59 kg/m²     General Appearance:    Alert, cooperative, no distress   Head:    Normocephalic, without obvious abnormality, atraumatic   Eyes:       Throat:   Lips, tongue, gums normal   Neck:   Supple, symmetrical, trachea midline, no JVD   Lungs:     Clear to auscultation bilaterally, respirations unlabored   Chest Wall:    No tenderness or deformity    Heart:    Regular rate and rhythm, S1 and S2 normal, no murmur,no  rub or gallop   Abdomen:     Soft, nontender, bowel sounds active, no " masses, no organomegaly    Extremities:   Extremities normal, atraumatic, no cyanosis or edema   Pulses:      Skin:   Skin is warm and dry,  no rashes or palpable lesions   Neurologic:   no focal deficits noted      Lab Results (last 72 hours)       Procedure Component Value Units Date/Time    POC Glucose Once [[id number]]  (Abnormal) Collected: 11/29/24 1115    Specimen: Blood Updated: 11/29/24 1117     Glucose 183 mg/dL     Urine Culture - Urine, Urine, Clean Catch [[id number]]  (Normal) Collected: 11/28/24 2016    Specimen: Urine, Clean Catch Updated: 11/29/24 1021     Urine Culture No growth    Basic Metabolic Panel [[id number]]  (Abnormal) Collected: 11/29/24 0542    Specimen: Blood Updated: 11/29/24 0702     Glucose 135 mg/dL      BUN 22 mg/dL      Creatinine 1.00 mg/dL      Sodium 138 mmol/L      Potassium 3.7 mmol/L      Chloride 106 mmol/L      CO2 21.0 mmol/L      Calcium 8.2 mg/dL      BUN/Creatinine Ratio 22.0     Anion Gap 11.0 mmol/L      eGFR 86.7 mL/min/1.73     Narrative:      GFR Normal >60  Chronic Kidney Disease <60  Kidney Failure <15      POC Glucose Once [[id number]]  (Normal) Collected: 11/29/24 0658    Specimen: Blood Updated: 11/29/24 0700     Glucose 116 mg/dL     Hemoglobin A1c [[id number]]  (Abnormal) Collected: 11/29/24 0542    Specimen: Blood Updated: 11/29/24 0648     Hemoglobin A1C 8.60 %     Narrative:      Hemoglobin A1C Ranges:    Increased Risk for Diabetes  5.7% to 6.4%  Diabetes                     >= 6.5%  Diabetic Goal                < 7.0%    CBC (No Diff) [[id number]]  (Abnormal) Collected: 11/29/24 0542    Specimen: Blood Updated: 11/29/24 0641     WBC 9.72 10*3/mm3      RBC 4.42 10*6/mm3      Hemoglobin 13.3 g/dL      Hematocrit 38.6 %      MCV 87.3 fL      MCH 30.1 pg      MCHC 34.5 g/dL      RDW 12.2 %      RDW-SD 38.8 fl      MPV 9.6 fL      Platelets 166 10*3/mm3     STAT Lactic Acid, Reflex [[id number]]  (Normal) Collected: 11/28/24 2255    Specimen: Blood Updated: 11/28/24  2315     Lactate 1.0 mmol/L     Ketone Bodies, Serum (Not performed at [place]) [[id number]]  (Normal) Collected: 11/28/24 1952    Specimen: Blood Updated: 11/28/24 2309    Narrative:      The following orders were created for panel order Ketone Bodies, Serum (Not performed at [facility]).  Procedure                               Abnormality         Status                     ---------                               -----------         ------                     Beta Hydroxybutyrate [place]...[[id number]]  Normal              Final result                 Please view results for these tests on the individual orders.    Beta Hydroxybutyrate Quantitative [[id number]]  (Normal) Collected: 11/28/24 1952    Specimen: Blood Updated: 11/28/24 2309     Beta-Hydroxybutyrate Quant 0.155 mmol/L     Narrative:      In the assessment of possible diabetic ketoacidosis, the test should be interpreted along with other clinical and laboratory findings.  A level greater than 1 mmol/L should require further evaluation and levels of more than 3 mmol/L require immediate medical review.    POC Glucose Once [[id number]]  (Abnormal) Collected: 11/28/24 2222    Specimen: Blood Updated: 11/28/24 2233     Glucose 177 mg/dL     Procalcitonin [[id number]]  (Abnormal) Collected: 11/28/24 1952    Specimen: Blood Updated: 11/28/24 2233     Procalcitonin 0.75 ng/mL     Narrative:      As a Marker for Sepsis (Non-Neonates):    1. <0.5 ng/mL represents a low risk of severe sepsis and/or septic shock.  2. >2 ng/mL represents a high risk of severe sepsis and/or septic shock.    As a Marker for Lower Respiratory Tract Infections that require antibiotic therapy:    PCT on Admission    Antibiotic Therapy       6-12 Hrs later    >0.5                Strongly Recommended  >0.25 - <0.5        Recommended   0.1 - 0.25          Discouraged              Remeasure/reassess PCT  <0.1                Strongly Discouraged     Remeasure/reassess PCT    As 28 day mortality risk  "marker: \"Change in Procalcitonin Result\" (>80% or <=80%) if Day 0 (or Day 1) and Day 4 values are available. Refer to http://[email or web address]-pct-[email or web address]    Change in PCT <=80%  A decrease of PCT levels below or equal to 80% defines a positive change in PCT test result representing a higher risk for 28-day all-cause mortality of patients diagnosed with severe sepsis for septic shock.    Change in PCT >80%  A decrease of PCT levels of more than 80% defines a negative change in PCT result representing a lower risk for 28-day all-cause mortality of patients diagnosed with severe sepsis or septic shock.       C-reactive Protein [[id number]]  (Abnormal) Collected: 11/28/24 1952    Specimen: Blood Updated: 11/28/24 2225     C-Reactive Protein 10.56 mg/dL     Urinalysis, Microscopic Only - Urine, Clean Catch [[id number]]  (Abnormal) Collected: 11/28/24 2016    Specimen: Urine, Clean Catch Updated: 11/28/24 2159     RBC, UA Too Numerous to Count /HPF      WBC, UA 6-10 /HPF      Bacteria, UA None Seen /HPF      Squamous Epithelial Cells, UA 0-2 /HPF      Yeast, UA Present /HPF      Hyaline Casts, UA 3-6 /LPF      Methodology Automated Microscopy    [place] Urine Culture Tube - Urine, Clean Catch [[id number]] Collected: 11/28/24 2016    Specimen: Urine, Clean Catch Updated: 11/28/24 2149     Extra Tube Hold for add-ons.     Comment: Auto resulted.       Blood Gas, Venous - [[id number]]  (Abnormal) Collected: 11/28/24 2126    Specimen: Venous Blood Updated: 11/28/24 2128     pH, Venous 7.403 pH Units      pCO2, Venous 36.3 mm Hg      pO2, Venous 44.8 mm Hg      HCO3, Venous 22.6 mmol/L      Base Excess, Venous -1.7 mmol/L      Comment: Serial Number: [id number]:  [id number]        O2 Saturation, Venous 81.0 %      CO2 Content 23.7 mmol/L      Barometric Pressure for Blood Gas 745.8000 mmHg      Modality Room Air    Magnesium [[id number]]  (Normal) Collected: 11/28/24 1953    Specimen: Blood Updated: 11/28/24 2027     Magnesium 2.1 " mg/dL     Comprehensive Metabolic Panel [[id number]]  (Abnormal) Collected: 11/28/24 1953    Specimen: Blood Updated: 11/28/24 2027     Glucose 415 mg/dL      BUN 31 mg/dL      Creatinine 1.31 mg/dL      Sodium 133 mmol/L      Potassium 4.0 mmol/L      Chloride 98 mmol/L      CO2 24.7 mmol/L      Calcium 9.2 mg/dL      Total Protein 6.9 g/dL      Albumin 4.0 g/dL      ALT (SGPT) 17 U/L      AST (SGOT) 16 U/L      Alkaline Phosphatase 80 U/L      Total Bilirubin 0.8 mg/dL      Globulin 2.9 gm/dL      A/G Ratio 1.4 g/dL      BUN/Creatinine Ratio 23.7     Anion Gap 10.3 mmol/L      eGFR 62.7 mL/min/1.73     Narrative:      GFR Normal >60  Chronic Kidney Disease <60  Kidney Failure <15      COVID-19 and FLU A/B PCR, 1 HR TAT - Swab, Nasopharynx [[id number]]  (Normal) Collected: 11/28/24 2002    Specimen: Swab from Nasopharynx Updated: 11/28/24 2027     COVID19 Not Detected     Influenza A PCR Not Detected     Influenza B PCR Not Detected    Narrative:      Fact sheet for providers: https://www.fda.gov/media/119386/download    Fact sheet for patients: https://www.fda.gov/media/344773/download    Test performed by PCR.    Lipase [[id number]]  (Normal) Collected: 11/28/24 1953    Specimen: Blood Updated: 11/28/24 2025     Lipase 25 U/L     Rapid Strep A Screen - Swab, Throat [[id number]]  (Normal) Collected: 11/28/24 2002    Specimen: Swab from Throat Updated: 11/28/24 2023     STREP A PCR Not Detected    Lactic Acid, Plasma [[id number]]  (Abnormal) Collected: 11/28/24 1953    Specimen: Blood Updated: 11/28/24 2022     Lactate 2.1 mmol/L     Urinalysis With Culture If Indicated - Urine, Clean Catch [[id number]]  (Abnormal) Collected: 11/28/24 2016    Specimen: Urine, Clean Catch Updated: 11/28/24 2021     Color, UA [place]     Appearance, UA Clear     pH, UA <=5.0     Specific Gravity, UA <=1.005     Glucose, UA >=1000 mg/dL (3+)     Ketones, UA Negative     Bilirubin, UA Negative     Blood, UA Large (3+)     Protein,   mg/dL (2+)     Leuk Esterase, UA Negative     Nitrite, UA Positive     Urobilinogen, UA 0.2 E.U./dL    Narrative:      In absence of clinical symptoms, the presence of pyuria, bacteria, and/or nitrites on the urinalysis result does not correlate with infection.    [place] Draw [[id number]] Collected: 11/28/24 1952    Specimen: Blood Updated: 11/28/24 2015    Narrative:      The following orders were created for panel order [place] Draw.  Procedure                               Abnormality         Status                     ---------                               -----------         ------                     Gold Top - SST[[id number]]                                   Final result               Light Blue Top[[id number]]                                   Final result                 Please view results for these tests on the individual orders.    Gold Top - SST [[id number]] Collected: 11/28/24 1952    Specimen: Blood Updated: 11/28/24 2015     Extra Tube Hold for add-ons.     Comment: Auto resulted.       Light Blue Top [[id number]] Collected: 11/28/24 1952    Specimen: Blood Updated: 11/28/24 2015     Extra Tube Hold for add-ons.     Comment: Auto resulted       Blood Culture - Blood, Arm, Left [[id number]] Collected: 11/28/24 2003    Specimen: Blood from Arm, Left Updated: 11/28/24 2006    Blood Culture - Blood, Arm, Right [[id number]] Collected: 11/28/24 2003    Specimen: Blood from Arm, Right Updated: 11/28/24 2006    CBC & Differential [[id number]]  (Abnormal) Collected: 11/28/24 1953    Specimen: Blood Updated: 11/28/24 2001    Narrative:      The following orders were created for panel order CBC & Differential.  Procedure                               Abnormality         Status                     ---------                               -----------         ------                     CBC Auto Differential[[id number]]        Abnormal            Final result                 Please view results for these tests on the  "individual orders.    CBC Auto Differential [[id number]]  (Abnormal) Collected: 11/28/24 1953    Specimen: Blood Updated: 11/28/24 2001     WBC 8.40 10*3/mm3      RBC 5.08 10*6/mm3      Hemoglobin 15.2 g/dL      Hematocrit 45.0 %      MCV 88.6 fL      MCH 29.9 pg      MCHC 33.8 g/dL      RDW 12.3 %      RDW-SD 40.5 fl      MPV 9.5 fL      Platelets 165 10*3/mm3      Neutrophil % 75.9 %      Lymphocyte % 8.3 %      Monocyte % 9.9 %      Eosinophil % 5.6 %      Basophil % 0.2 %      Immature Grans % 0.1 %      Neutrophils, Absolute 6.37 10*3/mm3      Lymphocytes, Absolute 0.70 10*3/mm3      Monocytes, Absolute 0.83 10*3/mm3      Eosinophils, Absolute 0.47 10*3/mm3      Basophils, Absolute 0.02 10*3/mm3      Immature Grans, Absolute 0.01 10*3/mm3           Data Review:  Results from last 7 days   Lab Units 11/29/24 0542 11/28/24 1953   SODIUM mmol/L 138 133*   POTASSIUM mmol/L 3.7 4.0   CHLORIDE mmol/L 106 98   CO2 mmol/L 21.0* 24.7   BUN mg/dL 22* 31*   CREATININE mg/dL 1.00 1.31*   GLUCOSE mg/dL 135* 415*   CALCIUM mg/dL 8.2* 9.2     Results from last 7 days   Lab Units 11/29/24 0542 11/28/24 1953   WBC 10*3/mm3 9.72 8.40   HEMOGLOBIN g/dL 13.3 15.2   HEMATOCRIT % 38.6 45.0   PLATELETS 10*3/mm3 166 165         Results from last 7 days   Lab Units 11/29/24 0542   HEMOGLOBIN A1C % 8.60*     No results found for: \"TROPONINT\"      Results from last 7 days   Lab Units 11/28/24 1953   ALK PHOS U/L 80   BILIRUBIN mg/dL 0.8   ALT (SGPT) U/L 17   AST (SGOT) U/L 16         Results from last 7 days   Lab Units 11/29/24 0542   HEMOGLOBIN A1C % 8.60*     Glucose   Date/Time Value Ref Range Status   11/29/2024 1115 183 (H) 70 - 130 mg/dL Final   11/29/2024 0658 116 70 - 130 mg/dL Final   11/28/2024 2222 177 (H) 70 - 130 mg/dL Final           Past Medical History:   Diagnosis Date    Acute UTI (urinary tract infection) 11/29/2024    Diabetes mellitus     Elevated PSA     Erectile dysfunction     Hormone disorder     i've been " taking testosterone supplements    Kidney stone     Kidney stone 08/23/2022       Assessment:  Active Hospital Problems    Diagnosis  POA    **Sepsis [A41.9]  Yes    Acute UTI (urinary tract infection) [N39.0]  Yes    Type 2 diabetes mellitus with hyperglycemia, without long-term current use of insulin [E11.65]  Yes    BEAR (obstructive sleep apnea) [G47.33]  Yes      Resolved Hospital Problems   No resolved problems to display.       Plan:  Continue with antibiotics for UTI and await cultures.  Will ask for urology consult.  Follow-up on labs and cultures.    Kraig Lao MD  11/29/2024  12:23 EST     Electronically signed by Kraig Lao MD at 11/29/24 1224          Consult Notes (all)        Bhaskar Guillermo MD at 11/30/24 1453        Consult Orders    1. Inpatient Infectious Diseases Consult [[id number]] ordered by Kraig Lao MD at 11/30/24 1401                 Referring Provider: Ta Estevez MD      Subjective   History of present illness:.  Past medical records, patient has a history of urolithiasis previously undergoing left ureteroscopy and laser lithotripsy be in September 2024 with removal of stent and October 2024.  He underwent a right ESWL with stent placement on 11/20/2024 and ureteroscopic, laser lithotripsy, stone basket extraction and stent exchange on 11/27/2024.  The day prior to admission he started feeling ill marked by fatigue, right-sided flank pain, confusion and fever and chills.  Given progression of his symptoms he presented to the emergency department.  CT showed well-placed stent but perinephric stranding concerning for pyelonephritis.  He was initially treated with ceftriaxone but was switched to micafungin as blood cultures are now growing Candida glabrata.  Patient says he feels better.  He denies any cardiopulmonary symptoms.  No visual symptoms.  No urinary symptoms.        Physical Exam:   Vital Signs   Temp:  [97.9 °F (36.6 °C)-99.1 °F (37.3 °C)] 97.9 °F (36.6  °C)  Heart Rate:  [] 89  Resp:  [17-18] 17  BP: (165-177)/(77-86) 166/78    GENERAL: Awake and alert, in no acute distress.   HEENT: Oropharynx is clear. Hearing is grossly normal.   EYES: . No conjunctival injection. No lid lag.   LUNGS:normal respiratory effort.   SKIN: no cutaneous eruptions in exposed areas  PSYCHIATRIC: Appropriate mood, affect, insight, and judgment.     Results Review:  WBC 10.1, hgb 14.9, plt 168  Cr1.1  Glucose 106 through 183  11/28 Bcx 1/2 c glabrata  CT a/p The patient has undergone placement of a double-J ureteral stent on the right, as well as right-sided lithotripsy. There is no hydronephrosis, but the patient does have perinephric stranding and heterogeneous enhancement of the right kidney, concerning for pyelonephritis. The patient has undergone placement of a double-J ureteral stent on the right, as well as right-sided lithotripsy. There is no hydronephrosis, but the patient does have perinephric stranding and heterogeneous enhancement of the right kidney, concerning for pyelonephritis.    CXR, independently interpreted: no acute pna    A/p  1.  Sepsis secondary #2  2.  Candida glabrata fungemia and pyelonephritis  3.  Diabetes mellitus type 2, continue glycemic control efforts to prevent/control infectious complications    Continue micafungin 100 mg IV every 24 hours.  Check repeat blood cultures in a.m. along with TTE but suspect urinary source  May be able to de-escalate to oral antifungals to complete 2 weeks of therapy if sensitivities allow  Stop Rocephin after today's dose     Thank you for this consult.  We will continue to follow along and tailor antibiotics as the patient's clinical course evolves.              Electronically signed by Bhaskar Guillermo MD at 11/30/24 1519       Brenda Sepncer APRN at 11/29/24 1300        Consult Orders    1. Inpatient Urology Consult [[id number]] ordered by Kraig Lao MD at 11/29/24 1141                        FIRST  UROLOGY CONSULT      Patient Identification:  NAME:  Maurizio Dejesus  Age:  59 y.o.   Sex:  male   :  1965   MRN:  [id number]     Chief complaint: nephrolithiasis     History of present illness:      Pt is a 59 y.o. male with a history of kidney stones who presented to the ED on 2024 with c/o fever and chills. Patient underwent cystoscopy with right ureteroscopy, laser lithotripsy, stone basket extraction and stent exchange with Dr. Diaz on 2024. Patient has a history of bilateral renal stones having undergone multiple procedures for treatment. He underwent a right ESWL with stent placement on 2024 by Dr. Diaz. Previously, he underwent a left ureteroscopy with laser lithotripsy and stent by Dr. Chamberlain on 2024. His stent was removed per cystoscopy on 10/4/2024.   Patient denies nausea, vomiting or difficulty with urination. Patient reports intermittent right flank pain.   Patient was admitted for further evaluation and care. He has been initiated on antibiotics. Urology was consulted to see this gentleman for further evaluation due to UTI with recent kidney stone intervention.   Patient's family is at bedside. His son is a neurosurgery resident at [facility] and assisted with patient history.     In hospital:  -AVSS, good UOP  -WBC - 9.72 (8.40)  -Creat - 1.00 (1.31)  -UA - Glucose: >1000, Blood 3+, Nitrite +, Protein: 100, RBC: TNTC, WBC: 6-10, Yeast: Present  -UCx - pending    -CT with contrast on 2024  - The patient has undergone placement of a double-J ureteral stent on the  right, as well as right-sided lithotripsy. There is no hydronephrosis,  but the patient does have perinephric stranding and heterogeneous  enhancement of the right kidney, concerning for pyelonephritis.        Past medical history:  Past Medical History:   Diagnosis Date    Acute UTI (urinary tract infection) 2024    Diabetes mellitus     Elevated PSA     Erectile dysfunction     Hormone disorder      i've been taking testosterone supplements    Kidney stone     Kidney stone 08/23/2022       Past surgical history:  Past Surgical History:   Procedure Laterality Date    CYSTOSCOPY W/ URETERAL STENT PLACEMENT Left 08/30/2024    Procedure: CYSTOSCOPY URETERAL CATHETER/STENT INSERTION;  Surgeon: Ayush Waldron MD;  Location: [facility];  Service: Urology;  Laterality: Left;    EXTRACORPOREAL SHOCK WAVE LITHOTRIPSY (ESWL) Left 08/30/2024    Procedure: EXTRACORPOREAL SHOCKWAVE LITHOTRIPSY;  Surgeon: Ayush Waldron MD;  Location: [facility];  Service: Urology;  Laterality: Left;    PROSTATE ULTRASOUND BIOPSY  04/2017    negative    VASECTOMY         Allergies:  Patient has no known allergies.    Home medications:  Medications Prior to Admission   Medication Sig Dispense Refill Last Dose/Taking    amphetamine-dextroamphetamine XR (Adderall XR) 30 MG 24 hr capsule Take 1 capsule by mouth Every Morning ADHD (Patient taking differently: Take 20 mg by mouth Every Morning ADHD) 30 capsule 0 11/28/2024 Morning    empagliflozin (JARDIANCE) 25 MG tablet tablet Take 1 tablet by mouth Daily. 90 tablet 1 11/28/2024 Morning    metFORMIN ER (GLUCOPHAGE-XR) 500 MG 24 hr tablet TAKE TWO TABLETS BY MOUTH TWICE DAILY 120 tablet 1 11/28/2024 Evening    nabumetone (RELAFEN) 750 MG tablet TAKE ONE TABLET BY MOUTH TWICE DAILY 180 tablet 1 11/28/2024 Morning    oxyCODONE-acetaminophen (PERCOCET) 7.5-325 MG per tablet Take 1 tablet by mouth Every 6 (Six) Hours As Needed for Moderate Pain.   11/28/2024 Noon    rosuvastatin (CRESTOR) 20 MG tablet Take 1 tablet by mouth Daily. 90 tablet 1 11/28/2024 Morning    SITagliptin (JANUVIA) 100 MG tablet Take 1 tablet by mouth Daily. 90 tablet 3 11/28/2024 Morning    glucose blood test strip Test blood sugar once daily 100 each 12     loratadine-pseudoephedrine (Claritin-D 24 Hour)  MG per 24 hr tablet Claritin-D 24 Hour 10 mg-240 mg tablet,extended release   Take 1 tablet every day by  oral route as needed for 90 days. (Patient not taking: Reported on 2024)   Not Taking    sildenafil (VIAGRA) 100 MG tablet Take 1 tablet by mouth As Needed for Erectile Dysfunction. 18 tablet 13         Hospital medications:  cefTRIAXone, 1,000 mg, Intravenous, Q24H  insulin lispro, 2-7 Units, Subcutaneous, 4x Daily AC & at Bedtime  rosuvastatin, 20 mg, Oral, Daily      sodium chloride, 100 mL/hr, Last Rate: 100 mL/hr (24 0339)        acetaminophen    senna-docusate sodium **AND** polyethylene glycol **AND** bisacodyl **AND** bisacodyl    dextrose    dextrose    glucagon (human recombinant)    nitroglycerin    ondansetron ODT **OR** ondansetron    oxyCODONE-acetaminophen    Family history:  Family History   Problem Relation Age of Onset    No Known Problems Father     No Known Problems Mother        Social history:  Social History     Tobacco Use    Smoking status: Never    Smokeless tobacco: Former     Types: Chew   Substance Use Topics    Alcohol use: Not Currently     Comment: A couple glasses of bourbon /mo    Drug use: No       Review of systems:      12 point negative except as in HPI    Objective:  TMax 24 hours:   Temp (24hrs), Av.9 °F (37.7 °C), Min:97.7 °F (36.5 °C), Max:103.1 °F (39.5 °C)      Vitals Ranges:   Temp:  [97.7 °F (36.5 °C)-103.1 °F (39.5 °C)] 97.9 °F (36.6 °C)  Heart Rate:  [] 86  Resp:  [18-20] 18  BP: (110-168)/(56-81) 139/77    Intake/Output Last 3 shifts:  I/O last 3 completed shifts:  In: -   Out: 500 [Urine:500]     Physical Exam:    General Appearance:    Alert, cooperative, NAD   Back:     No CVA tenderness   Lungs:     Respirations unlabored, no wheezing   Abdomen:     Soft, NDNT, no palpable bladder distension, nontender   :    Deferred   Neuro/Psych:   Orientation intact, mood/affect pleasant       Results review:   I reviewed the patient's new clinical results.      Assessment     Right kidney stone  UTI  3.   Right flank pain      Plan:     - No urgent  urologic intervention.  - Culture with NGTD. Not surprising given patient's several week course of keflex prior to presentation. Continue antibiotics per primary service. Recommend discharge with prophylactic course of antibiotics. Consult pharmacy for diflucan dosing with UA showing yeast.   - Start Tamsulosin 0.4 mg QD  - Will add Pyridium and Robaxin for stent colic.   - Regular diet  - Advised copious fluid intake  - Return to ER emergently if fever or signs of infection develop.  - Will arrange to reschedule his cystoscopy with stent removal with Dr. Diaz and have our office contact the patient.    - Call for any questions, concerns, or changes in patient's condition     NITHIN Jenkins  11/29/24  13:01 EST    Plan reviewed and discussed with Dr. Chamberlain.    Electronically signed by Brenda Spencer APRN at 11/29/24 7121

## 2024-12-02 NOTE — PLAN OF CARE
Goal Outcome Evaluation:  Plan of Care Reviewed With: patient        Progress: no change  Outcome Evaluation: VSS, some complaints of pain at the beginning of shift.  PRN percocet given.  Continue Micafungin IV.  Pt had some complaints of heart burn and nausea and vomiting.  Zofran given, IV pepsid ordered.  Plan is for pt to be discharged home today if okay with all.

## 2024-12-02 NOTE — PROGRESS NOTES
ID note for pyelonephritis/fungemia  Subjective: had some pain overnight that resolved. No vision complaints. AF.    Physical Exam:   Vital Signs   Temp:  [97.7 °F (36.5 °C)-98.8 °F (37.1 °C)] 97.7 °F (36.5 °C)  Heart Rate:  [83-96] 96  Resp:  [16-18] 18  BP: (151-195)/(74-93) 151/85    GENERAL: Awake and alert, in no acute distress.   HEENT: Oropharynx is clear. Hearing is grossly normal.   EYES: . No conjunctival injection. No lid lag.   LUNGS:normal respiratory effort.   SKIN: no cutaneous eruptions in exposed areas  PSYCHIATRIC: Appropriate mood, affect, insight, and judgment.     Results Review:  Wbc 11.1  Cr 11.19  Glc 158-214  11/28 Bcx 1/2 c glabrata  12/1 Bcx 2/2 ngtd    A/p  1.  Sepsis secondary #2  2.  Candida glabrata fungemia and pyelonephritis  3.  Diabetes mellitus type 2, continue glycemic control efforts to prevent/control infectious complications    Continue micafungin 100 mg IV every 24 hours. Repeat bcx and tte pending. If okay, likely dc on high dose fluconazole 800mg po q24 to complete two weeks of therapy        Thank you for this consult.  We will continue to follow along and tailor antibiotics as the patient's clinical course evolves.             [facility]se[facility]

## 2024-12-02 NOTE — PROGRESS NOTES
"DAILY PROGRESS NOTE  [facility]    Patient Identification:  Name: Maurizio Dejesus  Age: 59 y.o.  Sex: male  :  1965  MRN: [id number]         Primary Care Physician: Ta Estevez MD    Subjective:  Interval History: He complains of some right-sided abdominal pain but is feeling much better today.    Objective:    Scheduled Meds:amLODIPine, 5 mg, Oral, Q24H  famotidine, 20 mg, Intravenous, Q12H  insulin lispro, 2-7 Units, Subcutaneous, 4x Daily AC & at Bedtime  micafungin (MYCAMINE) IV, 100 mg, Intravenous, Q24H  rosuvastatin, 20 mg, Oral, Daily  tamsulosin, 0.4 mg, Oral, Daily      Continuous Infusions:       Vital signs in last 24 hours:  Temp:  [97.7 °F (36.5 °C)-98.1 °F (36.7 °C)] 98.1 °F (36.7 °C)  Heart Rate:  [83-96] 91  Resp:  [16-18] 17  BP: (151-195)/(74-93) 164/89    Intake/Output:    Intake/Output Summary (Last 24 hours) at 2024 1156  Last data filed at 2024 0900  Gross per 24 hour   Intake 960 ml   Output 1600 ml   Net -640 ml       Exam:  /89 (BP Location: Right arm, Patient Position: Sitting)   Pulse 91   Temp 98.1 °F (36.7 °C) (Oral)   Resp 17   Ht 182 cm (71.65\")   Wt 85 kg (187 lb 8 oz)   SpO2 97%   BMI 25.68 kg/m²     General Appearance:    Alert, cooperative, no distress   Head:    Normocephalic, without obvious abnormality, atraumatic   Eyes:       Throat:   Lips, tongue, gums normal   Neck:   Supple, symmetrical, trachea midline, no JVD   Lungs:     Clear to auscultation bilaterally, respirations unlabored   Chest Wall:    No tenderness or deformity    Heart:    Regular rate and rhythm, S1 and S2 normal, no murmur,no  rub or gallop   Abdomen:     Soft, nontender, bowel sounds active, no masses, no organomegaly    Extremities:   Extremities normal, atraumatic, no cyanosis or edema   Pulses:      Skin:   Skin is warm and dry,  no rashes or palpable lesions   Neurologic:   no focal deficits noted      Lab Results (last 72 hours)       Procedure " Component Value Units Date/Time    POC Glucose Once [[id number]]  (Abnormal) Collected: 11/29/24 1115    Specimen: Blood Updated: 11/29/24 1117     Glucose 183 mg/dL     Urine Culture - Urine, Urine, Clean Catch [[id number]]  (Normal) Collected: 11/28/24 2016    Specimen: Urine, Clean Catch Updated: 11/29/24 1021     Urine Culture No growth    Basic Metabolic Panel [[id number]]  (Abnormal) Collected: 11/29/24 0542    Specimen: Blood Updated: 11/29/24 0702     Glucose 135 mg/dL      BUN 22 mg/dL      Creatinine 1.00 mg/dL      Sodium 138 mmol/L      Potassium 3.7 mmol/L      Chloride 106 mmol/L      CO2 21.0 mmol/L      Calcium 8.2 mg/dL      BUN/Creatinine Ratio 22.0     Anion Gap 11.0 mmol/L      eGFR 86.7 mL/min/1.73     Narrative:      GFR Normal >60  Chronic Kidney Disease <60  Kidney Failure <15      POC Glucose Once [[id number]]  (Normal) Collected: 11/29/24 0658    Specimen: Blood Updated: 11/29/24 0700     Glucose 116 mg/dL     Hemoglobin A1c [[id number]]  (Abnormal) Collected: 11/29/24 0542    Specimen: Blood Updated: 11/29/24 0648     Hemoglobin A1C 8.60 %     Narrative:      Hemoglobin A1C Ranges:    Increased Risk for Diabetes  5.7% to 6.4%  Diabetes                     >= 6.5%  Diabetic Goal                < 7.0%    CBC (No Diff) [[id number]]  (Abnormal) Collected: 11/29/24 0542    Specimen: Blood Updated: 11/29/24 0641     WBC 9.72 10*3/mm3      RBC 4.42 10*6/mm3      Hemoglobin 13.3 g/dL      Hematocrit 38.6 %      MCV 87.3 fL      MCH 30.1 pg      MCHC 34.5 g/dL      RDW 12.2 %      RDW-SD 38.8 fl      MPV 9.6 fL      Platelets 166 10*3/mm3     STAT Lactic Acid, Reflex [[id number]]  (Normal) Collected: 11/28/24 2255    Specimen: Blood Updated: 11/28/24 2315     Lactate 1.0 mmol/L     Ketone Bodies, Serum (Not performed at [place]) [[id number]]  (Normal) Collected: 11/28/24 1952    Specimen: Blood Updated: 11/28/24 0850    Narrative:      The following orders were created for panel order Ketone Bodies, Serum  "(Not performed at [place]).  Procedure                               Abnormality         Status                     ---------                               -----------         ------                     Beta Hydroxybutyrate [place]...[[id number]]  Normal              Final result                 Please view results for these tests on the individual orders.    Beta Hydroxybutyrate Quantitative [[id number]]  (Normal) Collected: 11/28/24 1952    Specimen: Blood Updated: 11/28/24 2309     Beta-Hydroxybutyrate Quant 0.155 mmol/L     Narrative:      In the assessment of possible diabetic ketoacidosis, the test should be interpreted along with other clinical and laboratory findings.  A level greater than 1 mmol/L should require further evaluation and levels of more than 3 mmol/L require immediate medical review.    POC Glucose Once [[id number]]  (Abnormal) Collected: 11/28/24 2222    Specimen: Blood Updated: 11/28/24 2233     Glucose 177 mg/dL     Procalcitonin [[id number]]  (Abnormal) Collected: 11/28/24 1952    Specimen: Blood Updated: 11/28/24 2233     Procalcitonin 0.75 ng/mL     Narrative:      As a Marker for Sepsis (Non-Neonates):    1. <0.5 ng/mL represents a low risk of severe sepsis and/or septic shock.  2. >2 ng/mL represents a high risk of severe sepsis and/or septic shock.    As a Marker for Lower Respiratory Tract Infections that require antibiotic therapy:    PCT on Admission    Antibiotic Therapy       6-12 Hrs later    >0.5                Strongly Recommended  >0.25 - <0.5        Recommended   0.1 - 0.25          Discouraged              Remeasure/reassess PCT  <0.1                Strongly Discouraged     Remeasure/reassess PCT    As 28 day mortality risk marker: \"Change in Procalcitonin Result\" (>80% or <=80%) if Day 0 (or Day 1) and Day 4 values are available. Refer to http://www.[employer]-pct-calculator.com    Change in PCT <=80%  A decrease of PCT levels below or equal to 80% defines a positive change in PCT " test result representing a higher risk for 28-day all-cause mortality of patients diagnosed with severe sepsis for septic shock.    Change in PCT >80%  A decrease of PCT levels of more than 80% defines a negative change in PCT result representing a lower risk for 28-day all-cause mortality of patients diagnosed with severe sepsis or septic shock.       C-reactive Protein [[id number]]  (Abnormal) Collected: 11/28/24 1952    Specimen: Blood Updated: 11/28/24 2225     C-Reactive Protein 10.56 mg/dL     Urinalysis, Microscopic Only - Urine, Clean Catch [[id number]]  (Abnormal) Collected: 11/28/24 2016    Specimen: Urine, Clean Catch Updated: 11/28/24 2159     RBC, UA Too Numerous to Count /HPF      WBC, UA 6-10 /HPF      Bacteria, UA None Seen /HPF      Squamous Epithelial Cells, UA 0-2 /HPF      Yeast, UA Present /HPF      Hyaline Casts, UA 3-6 /LPF      Methodology Automated Microscopy    [place] Urine Culture Tube - Urine, Clean Catch [[id number]] Collected: 11/28/24 2016    Specimen: Urine, Clean Catch Updated: 11/28/24 2149     Extra Tube Hold for add-ons.     Comment: Auto resulted.       Blood Gas, Venous - [[id number]]  (Abnormal) Collected: 11/28/24 2126    Specimen: Venous Blood Updated: 11/28/24 2128     pH, Venous 7.403 pH Units      pCO2, Venous 36.3 mm Hg      pO2, Venous 44.8 mm Hg      HCO3, Venous 22.6 mmol/L      Base Excess, Venous -1.7 mmol/L      Comment: Serial Number: [id number]  [id number]        O2 Saturation, Venous 81.0 %      CO2 Content 23.7 mmol/L      Barometric Pressure for Blood Gas 745.8000 mmHg      Modality Room Air    Magnesium [[id number]]  (Normal) Collected: 11/28/24 1953    Specimen: Blood Updated: 11/28/24 2027     Magnesium 2.1 mg/dL     Comprehensive Metabolic Panel [[id number]]  (Abnormal) Collected: 11/28/24 1953    Specimen: Blood Updated: 11/28/24 2027     Glucose 415 mg/dL      BUN 31 mg/dL      Creatinine 1.31 mg/dL      Sodium 133 mmol/L      Potassium 4.0 mmol/L       Chloride 98 mmol/L      CO2 24.7 mmol/L      Calcium 9.2 mg/dL      Total Protein 6.9 g/dL      Albumin 4.0 g/dL      ALT (SGPT) 17 U/L      AST (SGOT) 16 U/L      Alkaline Phosphatase 80 U/L      Total Bilirubin 0.8 mg/dL      Globulin 2.9 gm/dL      A/G Ratio 1.4 g/dL      BUN/Creatinine Ratio 23.7     Anion Gap 10.3 mmol/L      eGFR 62.7 mL/min/1.73     Narrative:      GFR Normal >60  Chronic Kidney Disease <60  Kidney Failure <15      COVID-19 and FLU A/B PCR, 1 HR TAT - Swab, Nasopharynx [[id number]]  (Normal) Collected: 11/28/24 2002    Specimen: Swab from Nasopharynx Updated: 11/28/24 2027     COVID19 Not Detected     Influenza A PCR Not Detected     Influenza B PCR Not Detected    Narrative:      Fact sheet for providers: https://www.fda.gov/media/840572/download    Fact sheet for patients: https://www.fda.gov/media/566230/download    Test performed by PCR.    Lipase [[id number]]  (Normal) Collected: 11/28/24 1953    Specimen: Blood Updated: 11/28/24 2025     Lipase 25 U/L     Rapid Strep A Screen - Swab, Throat [[id number]]  (Normal) Collected: 11/28/24 2002    Specimen: Swab from Throat Updated: 11/28/24 2023     STREP A PCR Not Detected    Lactic Acid, Plasma [[id number]]  (Abnormal) Collected: 11/28/24 1953    Specimen: Blood Updated: 11/28/24 2022     Lactate 2.1 mmol/L     Urinalysis With Culture If Indicated - Urine, Clean Catch [[id number]]  (Abnormal) Collected: 11/28/24 2016    Specimen: Urine, Clean Catch Updated: 11/28/24 2021     Color, UA [place]     Appearance, UA Clear     pH, UA <=5.0     Specific Gravity, UA <=1.005     Glucose, UA >=1000 mg/dL (3+)     Ketones, UA Negative     Bilirubin, UA Negative     Blood, UA Large (3+)     Protein,  mg/dL (2+)     Leuk Esterase, UA Negative     Nitrite, UA Positive     Urobilinogen, UA 0.2 E.U./dL    Narrative:      In absence of clinical symptoms, the presence of pyuria, bacteria, and/or nitrites on the urinalysis result does not correlate with  infection.    [place] Draw [[id number]] Collected: 11/28/24 1952    Specimen: Blood Updated: 11/28/24 2015    Narrative:      The following orders were created for panel order [place] Draw.  Procedure                               Abnormality         Status                     ---------                               -----------         ------                     Gold Top - SST[[id number]]                                   Final result               Light Blue Top[[id number]]                                   Final result                 Please view results for these tests on the individual orders.    [facility] - SST [[id number]] Collected: 11/28/24 1952    Specimen: Blood Updated: 11/28/24 2015     Extra Tube Hold for add-ons.     Comment: Auto resulted.       Light Blue Top [[id number]] Collected: 11/28/24 1952    Specimen: Blood Updated: 11/28/24 2015     Extra Tube Hold for add-ons.     Comment: Auto resulted       Blood Culture - Blood, Arm, Left [[id number]] Collected: 11/28/24 2003    Specimen: Blood from Arm, Left Updated: 11/28/24 2006    Blood Culture - Blood, Arm, Right [[id number]] Collected: 11/28/24 2003    Specimen: Blood from Arm, Right Updated: 11/28/24 2006    CBC & Differential [[id number]]  (Abnormal) Collected: 11/28/24 1953    Specimen: Blood Updated: 11/28/24 2001    Narrative:      The following orders were created for panel order CBC & Differential.  Procedure                               Abnormality         Status                     ---------                               -----------         ------                     CBC Auto Differential[[id number]]        Abnormal            Final result                 Please view results for these tests on the individual orders.    CBC Auto Differential [[id number]]  (Abnormal) Collected: 11/28/24 1953    Specimen: Blood Updated: 11/28/24 2001     WBC 8.40 10*3/mm3      RBC 5.08 10*6/mm3      Hemoglobin 15.2 g/dL      Hematocrit 45.0 %      MCV 88.6 fL      MCH  "29.9 pg      MCHC 33.8 g/dL      RDW 12.3 %      RDW-SD 40.5 fl      MPV 9.5 fL      Platelets 165 10*3/mm3      Neutrophil % 75.9 %      Lymphocyte % 8.3 %      Monocyte % 9.9 %      Eosinophil % 5.6 %      Basophil % 0.2 %      Immature Grans % 0.1 %      Neutrophils, Absolute 6.37 10*3/mm3      Lymphocytes, Absolute 0.70 10*3/mm3      Monocytes, Absolute 0.83 10*3/mm3      Eosinophils, Absolute 0.47 10*3/mm3      Basophils, Absolute 0.02 10*3/mm3      Immature Grans, Absolute 0.01 10*3/mm3           Data Review:  Results from last 7 days   Lab Units 12/02/24  0552 12/01/24  0751 11/30/24  0659   SODIUM mmol/L 135* 136 139   POTASSIUM mmol/L 4.2 4.5 4.0   CHLORIDE mmol/L 104 103 102   CO2 mmol/L 8.9* 7.8* 14.7*   BUN mg/dL 23* 21* 15   CREATININE mg/dL 1.19 1.25 1.09   GLUCOSE mg/dL 191* 182* 115*   CALCIUM mg/dL 9.2 9.2 8.9     Results from last 7 days   Lab Units 12/02/24  0552 12/01/24  0751 11/30/24  0659   WBC 10*3/mm3 11.15* 11.53* 10.10   HEMOGLOBIN g/dL 14.8 14.6 14.9   HEMATOCRIT % 42.7 43.0 44.2   PLATELETS 10*3/mm3 213 208 168         Results from last 7 days   Lab Units 11/29/24  0542   HEMOGLOBIN A1C % 8.60*     No results found for: \"TROPONINT\"      Results from last 7 days   Lab Units 11/28/24 1953   ALK PHOS U/L 80   BILIRUBIN mg/dL 0.8   ALT (SGPT) U/L 17   AST (SGOT) U/L 16         Results from last 7 days   Lab Units 11/29/24  0542   HEMOGLOBIN A1C % 8.60*     Glucose   Date/Time Value Ref Range Status   12/02/2024 1130 179 (H) 70 - 130 mg/dL Final   12/02/2024 0614 158 (H) 70 - 130 mg/dL Final   12/01/2024 2023 200 (H) 70 - 130 mg/dL Final   12/01/2024 1616 171 (H) 70 - 130 mg/dL Final   12/01/2024 1109 214 (H) 70 - 130 mg/dL Final   12/01/2024 0604 174 (H) 70 - 130 mg/dL Final   11/30/2024 2107 136 (H) 70 - 130 mg/dL Final   11/30/2024 1628 132 (H) 70 - 130 mg/dL Final           Past Medical History:   Diagnosis Date    Acute UTI (urinary tract infection) 11/29/2024    Diabetes mellitus     " Elevated PSA     Erectile dysfunction     Hormone disorder     i've been taking testosterone supplements    Kidney stone     Kidney stone 08/23/2022       Assessment:  Active Hospital Problems    Diagnosis  POA    **Sepsis [A41.9]  Yes    Acute UTI (urinary tract infection) [N39.0]  Yes    Type 2 diabetes mellitus with hyperglycemia, without long-term current use of insulin [E11.65]  Yes    BEAR (obstructive sleep apnea) [G47.33]  Yes      Resolved Hospital Problems   No resolved problems to display.       Plan:  Continue with antibiotics for UTI and await cultures.   ID consult noted.  Continue micafungin.   urology consult noted.  Follow-up on labs and cultures.  Follow-up on repeat blood cultures.  Echocardiogram report noted.  May be home tomorrow on oral Diflucan high-dose.    Kraig Lao MD  12/2/2024  11:56 EST

## 2024-12-03 ENCOUNTER — READMISSION MANAGEMENT (OUTPATIENT)
Dept: CALL CENTER | Facility: HOSPITAL | Age: 59
End: 2024-12-03
Payer: COMMERCIAL

## 2024-12-03 VITALS
TEMPERATURE: 97.5 F | RESPIRATION RATE: 18 BRPM | HEART RATE: 85 BPM | OXYGEN SATURATION: 95 % | DIASTOLIC BLOOD PRESSURE: 81 MMHG | HEIGHT: 72 IN | BODY MASS INDEX: 24.96 KG/M2 | SYSTOLIC BLOOD PRESSURE: 150 MMHG | WEIGHT: 184.3 LBS

## 2024-12-03 PROBLEM — N10 ACUTE PYELONEPHRITIS: Status: ACTIVE | Noted: 2024-12-03

## 2024-12-03 PROBLEM — B37.9 CANDIDA GLABRATA INFECTION: Status: ACTIVE | Noted: 2024-12-03

## 2024-12-03 PROBLEM — B49 FUNGEMIA: Status: ACTIVE | Noted: 2024-12-03

## 2024-12-03 LAB
COLOR STONE: NORMAL
COM MFR STONE: 100 %
COMPN STONE: NORMAL
GLUCOSE BLDC GLUCOMTR-MCNC: 157 MG/DL (ref 70–130)
GLUCOSE BLDC GLUCOMTR-MCNC: 237 MG/DL (ref 70–130)
LABORATORY COMMENT REPORT: NORMAL
Lab: NORMAL
Lab: NORMAL
PHOTO: NORMAL
SIZE STONE: NORMAL MM
SPEC SOURCE SUBJ: NORMAL
WT STONE: 57 MG

## 2024-12-03 PROCEDURE — 99232 SBSQ HOSP IP/OBS MODERATE 35: CPT | Performed by: INTERNAL MEDICINE

## 2024-12-03 PROCEDURE — 82948 REAGENT STRIP/BLOOD GLUCOSE: CPT

## 2024-12-03 PROCEDURE — 63710000001 INSULIN LISPRO (HUMAN) PER 5 UNITS

## 2024-12-03 RX ORDER — TAMSULOSIN HYDROCHLORIDE 0.4 MG/1
0.4 CAPSULE ORAL DAILY
Qty: 30 CAPSULE | Refills: 0 | Status: SHIPPED | OUTPATIENT
Start: 2024-12-04 | End: 2025-01-03

## 2024-12-03 RX ORDER — METHOCARBAMOL 750 MG/1
750 TABLET, FILM COATED ORAL 3 TIMES DAILY PRN
Qty: 30 TABLET | Refills: 0 | Status: SHIPPED | OUTPATIENT
Start: 2024-12-03 | End: 2024-12-13

## 2024-12-03 RX ORDER — FLUCONAZOLE 200 MG/1
800 TABLET ORAL EVERY 24 HOURS
Status: DISCONTINUED | OUTPATIENT
Start: 2024-12-03 | End: 2024-12-03 | Stop reason: HOSPADM

## 2024-12-03 RX ORDER — AMLODIPINE BESYLATE 5 MG/1
5 TABLET ORAL
Qty: 30 TABLET | Refills: 0 | Status: SHIPPED | OUTPATIENT
Start: 2024-12-04 | End: 2025-01-03

## 2024-12-03 RX ORDER — FLUCONAZOLE 200 MG/1
800 TABLET ORAL EVERY 24 HOURS
Qty: 44 TABLET | Refills: 0 | Status: SHIPPED | OUTPATIENT
Start: 2024-12-04 | End: 2024-12-15

## 2024-12-03 RX ADMIN — FAMOTIDINE 20 MG: 10 INJECTION INTRAVENOUS at 08:38

## 2024-12-03 RX ADMIN — FLUCONAZOLE 800 MG: 200 TABLET ORAL at 12:02

## 2024-12-03 RX ADMIN — ROSUVASTATIN CALCIUM 20 MG: 20 TABLET, FILM COATED ORAL at 08:38

## 2024-12-03 RX ADMIN — BISACODYL 5 MG: 5 TABLET, COATED ORAL at 08:43

## 2024-12-03 RX ADMIN — INSULIN LISPRO 2 UNITS: 100 INJECTION, SOLUTION INTRAVENOUS; SUBCUTANEOUS at 08:39

## 2024-12-03 RX ADMIN — TAMSULOSIN HYDROCHLORIDE 0.4 MG: 0.4 CAPSULE ORAL at 08:38

## 2024-12-03 RX ADMIN — INSULIN LISPRO 3 UNITS: 100 INJECTION, SOLUTION INTRAVENOUS; SUBCUTANEOUS at 12:19

## 2024-12-03 RX ADMIN — AMLODIPINE BESYLATE 5 MG: 5 TABLET ORAL at 08:38

## 2024-12-03 NOTE — PROGRESS NOTES
ID note for pyelonephritis/fungemia  Subjective: no pain. No vision complaints. AF.    Physical Exam:   Vital Signs   Temp:  [97.5 °F (36.4 °C)-98.2 °F (36.8 °C)] 97.5 °F (36.4 °C)  Heart Rate:  [] 85  Resp:  [17-20] 18  BP: (150-171)/(79-84) 150/81    GENERAL: Awake and alert, in no acute distress.   HEENT: Oropharynx is clear. Hearing is grossly normal.   EYES: . No conjunctival injection. No lid lag.   LUNGS:normal respiratory effort.   SKIN: no cutaneous eruptions in exposed areas  PSYCHIATRIC: Appropriate mood, affect, insight, and judgment.     Results Review:  Glc 147-179    11/28 Bcx 1/2 c glabrata  12/1 Bcx 2/2 ngtd    A/p  1.  Sepsis secondary #2  2.  Candida glabrata fungemia and pyelonephritis  3.  Diabetes mellitus type 2, continue glycemic control efforts to prevent/control infectious complications    Repeat bcx ngtd and tte neg. DC micafungin. Start high dose fluconazole 800mg po q24 to complete two weeks of therapy   Home when okay with others

## 2024-12-03 NOTE — DISCHARGE SUMMARY
PHYSICIAN DISCHARGE SUMMARY                                                                        [facility]    Patient Identification:  Name: Maurizio Dejesus  Age: 59 y.o.  Sex: male  :  1965  MRN: [id number]  Primary Care Physician: Ta Estevez MD    Admit date: 2024  Discharge date and time:12/3/2024  Discharged Condition: good    Discharge Diagnoses:  Active Hospital Problems    Diagnosis  POA    **Sepsis [A41.9]  Yes    Candida glabrata infection [B37.9]  Unknown    Fungemia [B49]  Unknown    Acute pyelonephritis [N10]  Unknown    Acute UTI (urinary tract infection) [N39.0]  Yes    Type 2 diabetes mellitus with hyperglycemia, without long-term current use of insulin [E11.65]  Yes    BEAR (obstructive sleep apnea) [G47.33]  Yes      Resolved Hospital Problems   No resolved problems to display.   UTI related to right ureteral stent      PMHX:   Past Medical History:   Diagnosis Date    Acute pyelonephritis 12/3/2024    Acute UTI (urinary tract infection) 2024    Diabetes mellitus     Elevated PSA     Erectile dysfunction     Hormone disorder     i've been taking testosterone supplements    Kidney stone     Kidney stone 2022     PSHX:   Past Surgical History:   Procedure Laterality Date    CYSTOSCOPY W/ URETERAL STENT PLACEMENT Left 2024    Procedure: CYSTOSCOPY URETERAL CATHETER/STENT INSERTION;  Surgeon: Ayush Waldron MD;  Location: [facility];  Service: Urology;  Laterality: Left;    EXTRACORPOREAL SHOCK WAVE LITHOTRIPSY (ESWL) Left 2024    Procedure: EXTRACORPOREAL SHOCKWAVE LITHOTRIPSY;  Surgeon: Ayush Waldron MD;  Location: [facility];  Service: Urology;  Laterality: Left;    PROSTATE ULTRASOUND BIOPSY  2017    negative    VASECTOMY         Hospital Course: Maurizio Dejesus  is a  59-year-old male with a past medical history of type 2 diabetes, kidney stone, erectile  dysfunction, and obstructive sleep apnea presents to [facility] ED with complaints of hyperglycemia.  Patient reports lithotripsy and ureteroscopy with a right sided stent placement on 11/27/2024.  Patient noted fever of 102.9 on arrival to the ED.  Patient noted to be having chills and not able to get comfortable in the bed.  Denies shortness of breath, chest pain, nausea, vomiting, diarrhea, or difficulty urinating.  He does report some right lower quadrant pain, and family member reports that it has been a significant amount of time since he has had any pain medicine.  The patient was admitted to the hospital and seen by urology and infectious disease.  Blood cultures were growing Candida glabrata.  Patient was on micafungin for few days and looked well enough to go home.  He will finish a course of Diflucan and follow-up with his primary care in 1 week for ongoing care and also follow-up with urology.  Pharmacy had recommended that he hold the Jardiance while he is taking high dose of Diflucan due to drug interaction which may cause QT prolongation.    Consults:     Consults       Date and Time Order Name Status Description    11/30/2024  2:01 PM Inpatient Infectious Diseases Consult Completed     11/29/2024 11:41 AM Inpatient Urology Consult Completed           Results from last 7 days   Lab Units 12/02/24  0552   WBC 10*3/mm3 11.15*   HEMOGLOBIN g/dL 14.8   HEMATOCRIT % 42.7   PLATELETS 10*3/mm3 213     Results from last 7 days   Lab Units 12/02/24  0552   SODIUM mmol/L 135*   POTASSIUM mmol/L 4.2   CHLORIDE mmol/L 104   CO2 mmol/L 8.9*   BUN mg/dL 23*   CREATININE mg/dL 1.19   GLUCOSE mg/dL 191*   CALCIUM mg/dL 9.2     Significant Diagnostic Studies:   WBC   Date Value Ref Range Status   12/02/2024 11.15 (H) 3.40 - 10.80 10*3/mm3 Final     Hemoglobin   Date Value Ref Range Status   12/02/2024 14.8 13.0 - 17.7 g/dL Final     Hematocrit   Date Value Ref Range Status   12/02/2024 42.7 37.5 -  "51.0 % Final     Platelets   Date Value Ref Range Status   12/02/2024 213 140 - 450 10*3/mm3 Final     Sodium   Date Value Ref Range Status   12/02/2024 135 (L) 136 - 145 mmol/L Final     Potassium   Date Value Ref Range Status   12/02/2024 4.2 3.5 - 5.2 mmol/L Final     Chloride   Date Value Ref Range Status   12/02/2024 104 98 - 107 mmol/L Final     CO2   Date Value Ref Range Status   12/02/2024 8.9 (C) 22.0 - 29.0 mmol/L Final     BUN   Date Value Ref Range Status   12/02/2024 23 (H) 6 - 20 mg/dL Final     Creatinine   Date Value Ref Range Status   12/02/2024 1.19 0.76 - 1.27 mg/dL Final     Glucose   Date Value Ref Range Status   12/02/2024 191 (H) 65 - 99 mg/dL Final     Calcium   Date Value Ref Range Status   12/02/2024 9.2 8.6 - 10.5 mg/dL Final     No results found for: \"AST\", \"ALT\", \"ALKPHOS\"  No results found for: \"APTT\", \"INR\"  No results found for: \"COLORU\", \"CLARITYU\", \"SPECGRAV\", \"PHUR\", \"PROTEINUR\", \"GLUCOSEU\", \"KETONESU\", \"BLOODU\", \"NITRITE\", \"LEUKOCYTESUR\", \"BILIRUBINUR\", \"UROBILINOGEN\", \"RBCUA\", \"WBCUA\", \"BACTERIA\", \"UACOMMENT\"  No results found for: \"TROPONINT\", \"TROPONINI\", \"BNP\"  No components found for: \"HGBA1C;2\"  No components found for: \"TSH;2\"  Imaging Results (All)       Procedure Component Value Units Date/Time    CT Abdomen Pelvis With Contrast [[id number]] Collected: 11/28/24 2118     Updated: 11/28/24 2136    Narrative:      CT OF THE ABDOMEN PELVIS WITH CONTRAST     HISTORY: Sepsis     COMPARISON: August 30, 2024     TECHNIQUE: Axial CT imaging was obtained through the abdomen and pelvis.  IV contrast was administered.     FINDINGS:  Images through the lung bases demonstrate dependent atelectasis. A few  tiny cysts are noted within the liver. There is a small hiatal hernia.  The duodenum, adrenal glands, and spleen are normal. There is some  minimal pancreatic atrophy. Gallbladder is normal. The patient has a  double-J ureteral stent noted on the right. No hydronephrosis is " seen.  There is some perinephric stranding which is noted on the right, as well  as heterogeneous enhancement of the right kidney, suggesting  pyelonephritis. There is a stable 5 mm nonobstructing stone within the  right kidney. A previously noted small staghorn calculus within the  right kidney is no longer identified. The patient does have a small  stone fragment within the urinary bladder. No stones are noted on the  left. There is no hydronephrosis on the left. There is air within the  urinary bladder, in keeping with recent procedure. The prostate gland is  enlarged and contains dystrophic calcifications. There is no bowel  obstruction. There is colonic diverticulosis. The appendix is normal. No  acute osseous abnormalities are seen. There are small bilateral  fat-containing inguinal hernias.       Impression:      The patient has undergone placement of a double-J ureteral stent on the  right, as well as right-sided lithotripsy. There is no hydronephrosis,  but the patient does have perinephric stranding and heterogeneous  enhancement of the right kidney, concerning for pyelonephritis.     Radiation dose reduction techniques were utilized, including automated  exposure control and exposure modulation based on body size.        This report was finalized on 11/28/2024 9:33 PM by Dr. Anel Recinos M.D on Workstation: [email or web address]       XR Chest 1 View [[id number]] Collected: 11/28/24 2043     Updated: 11/28/24 2046    Narrative:      SINGLE VIEW OF THE CHEST     HISTORY: Fever     COMPARISON: None available.     FINDINGS:  Heart size is within normal limits. No pneumothorax, pleural effusion,  or acute infiltrate is seen.       Impression:      No acute findings.     This report was finalized on 11/28/2024 8:43 PM by Dr. Anel Recinos M.D on Workstation: [id number]             Lab Results (last 7 days)       Procedure Component Value Units Date/Time    POC Glucose Once [[id number]]  (Abnormal) Collected:  12/03/24 1105    Specimen: Blood Updated: 12/03/24 1106     Glucose 237 mg/dL     Blood Culture - Blood, Arm, Right [[id number]]  (Normal) Collected: 12/01/24 0742    Specimen: Blood from Arm, Right Updated: 12/03/24 0845     Blood Culture No growth at 2 days    Blood Culture - Blood, Arm, Left [[id number]]  (Normal) Collected: 12/01/24 0751    Specimen: Blood from Arm, Left Updated: 12/03/24 0845     Blood Culture No growth at 2 days    Blood Culture - Blood, Arm, Right [[id number]]  (Abnormal) Collected: 11/28/24 2003    Specimen: Blood from Arm, Right Updated: 12/03/24 0640     Blood Culture No growth at 4 days      Candida glabrata     Comment:   Infectious disease consultation is highly recommended to rule out distant foci of infection.        Isolated from Aerobic Bottle     Gram Stain Aerobic Bottle Yeast      Anaerobic Bottle Yeast    Narrative:      Refer to previous blood culture collected on 11/28/2024 2003 for MICs    POC Glucose Once [[id number]]  (Abnormal) Collected: 12/03/24 0637    Specimen: Blood Updated: 12/03/24 0638     Glucose 157 mg/dL     Blood Culture - Blood, Arm, Left [[id number]]  (Abnormal)  (Susceptibility) Collected: 11/28/24 2003    Specimen: Blood from Arm, Left Updated: 12/02/24 2352     Blood Culture No growth at 4 days      Candida glabrata     Comment:   Infectious disease consultation is highly recommended to rule out distant foci of infection.        Isolated from Anaerobic Bottle     Gram Stain Anaerobic Bottle Yeast      Aerobic Bottle Yeast     Comment: Bottle Reloaded       Susceptibility        Candida glabrata      PATRICK Not Specified      Fluconazole  Susceptible dose dependent  [1]       Micafungin Susceptible                      [1]  Fluconazole is susceptible with the use of higher dose fluconazole (12 mg/kg daily or 800 mg daily).                    POC Glucose Once [[id number]]  (Abnormal) Collected: 12/02/24 2036    Specimen: Blood Updated: 12/02/24 2038     Glucose 179  mg/dL     POC Glucose Once [[id number]]  (Abnormal) Collected: 12/02/24 1607    Specimen: Blood Updated: 12/02/24 1618     Glucose 147 mg/dL     POC Glucose Once [[id number]]  (Abnormal) Collected: 12/02/24 1130    Specimen: Blood Updated: 12/02/24 1133     Glucose 179 mg/dL     Basic Metabolic Panel [[id number]]  (Abnormal) Collected: 12/02/24 0552    Specimen: Blood Updated: 12/02/24 0650     Glucose 191 mg/dL      BUN 23 mg/dL      Creatinine 1.19 mg/dL      Sodium 135 mmol/L      Potassium 4.2 mmol/L      Chloride 104 mmol/L      CO2 8.9 mmol/L      Calcium 9.2 mg/dL      BUN/Creatinine Ratio 19.3     Anion Gap 22.1 mmol/L      eGFR 70.4 mL/min/1.73     Narrative:      GFR Normal >60  Chronic Kidney Disease <60  Kidney Failure <15      CBC & Differential [[id number]]  (Abnormal) Collected: 12/02/24 0552    Specimen: Blood Updated: 12/02/24 0621    Narrative:      The following orders were created for panel order CBC & Differential.  Procedure                               Abnormality         Status                     ---------                               -----------         ------                     CBC Auto Differential[[id number]]        Abnormal            Final result                 Please view results for these tests on the individual orders.    CBC Auto Differential [[id number]]  (Abnormal) Collected: 12/02/24 0552    Specimen: Blood Updated: 12/02/24 0621     WBC 11.15 10*3/mm3      RBC 4.83 10*6/mm3      Hemoglobin 14.8 g/dL      Hematocrit 42.7 %      MCV 88.4 fL      MCH 30.6 pg      MCHC 34.7 g/dL      RDW 12.3 %      RDW-SD 39.7 fl      MPV 9.3 fL      Platelets 213 10*3/mm3      Neutrophil % 74.8 %      Lymphocyte % 7.0 %      Monocyte % 15.0 %      Eosinophil % 2.3 %      Basophil % 0.4 %      Immature Grans % 0.5 %      Neutrophils, Absolute 8.33 10*3/mm3      Lymphocytes, Absolute 0.78 10*3/mm3      Monocytes, Absolute 1.67 10*3/mm3      Eosinophils, Absolute 0.26 10*3/mm3      Basophils, Absolute  0.05 10*3/mm3      Immature Grans, Absolute 0.06 10*3/mm3      nRBC 0.0 /100 WBC     POC Glucose Once [[id number]]  (Abnormal) Collected: 12/02/24 0614    Specimen: Blood Updated: 12/02/24 0616     Glucose 158 mg/dL     POC Glucose Once [[id number]]  (Abnormal) Collected: 12/01/24 2023    Specimen: Blood Updated: 12/01/24 2024     Glucose 200 mg/dL     POC Glucose Once [[id number]]  (Abnormal) Collected: 12/01/24 1616    Specimen: Blood Updated: 12/01/24 1617     Glucose 171 mg/dL     POC Glucose Once [[id number]]  (Abnormal) Collected: 12/01/24 1109    Specimen: Blood Updated: 12/01/24 1112     Glucose 214 mg/dL     Basic Metabolic Panel [[id number]]  (Abnormal) Collected: 12/01/24 0751    Specimen: Blood Updated: 12/01/24 0940     Glucose 182 mg/dL      BUN 21 mg/dL      Creatinine 1.25 mg/dL      Sodium 136 mmol/L      Potassium 4.5 mmol/L      Chloride 103 mmol/L      CO2 7.8 mmol/L      Calcium 9.2 mg/dL      BUN/Creatinine Ratio 16.8     Anion Gap 25.2 mmol/L      eGFR 66.3 mL/min/1.73     Narrative:      GFR Normal >60  Chronic Kidney Disease <60  Kidney Failure <15      CBC & Differential [[id number]]  (Abnormal) Collected: 12/01/24 0751    Specimen: Blood Updated: 12/01/24 0845    Narrative:      The following orders were created for panel order CBC & Differential.  Procedure                               Abnormality         Status                     ---------                               -----------         ------                     CBC Auto Differential[[id number]]        Abnormal            Final result                 Please view results for these tests on the individual orders.    CBC Auto Differential [[id number]]  (Abnormal) Collected: 12/01/24 0751    Specimen: Blood Updated: 12/01/24 0845     WBC 11.53 10*3/mm3      RBC 4.79 10*6/mm3      Hemoglobin 14.6 g/dL      Hematocrit 43.0 %      MCV 89.8 fL      MCH 30.5 pg      MCHC 34.0 g/dL      RDW 12.4 %      RDW-SD 40.4 fl      MPV 9.4 fL      Platelets  208 10*3/mm3      Neutrophil % 77.7 %      Lymphocyte % 5.3 %      Monocyte % 15.6 %      Eosinophil % 0.7 %      Basophil % 0.3 %      Immature Grans % 0.4 %      Neutrophils, Absolute 8.95 10*3/mm3      Lymphocytes, Absolute 0.61 10*3/mm3      Monocytes, Absolute 1.80 10*3/mm3      Eosinophils, Absolute 0.08 10*3/mm3      Basophils, Absolute 0.04 10*3/mm3      Immature Grans, Absolute 0.05 10*3/mm3      nRBC 0.0 /100 WBC     POC Glucose Once [[id number]]  (Abnormal) Collected: 12/01/24 0604    Specimen: Blood Updated: 12/01/24 0605     Glucose 174 mg/dL     POC Glucose Once [[id number]]  (Abnormal) Collected: 11/30/24 2107    Specimen: Blood Updated: 11/30/24 2108     Glucose 136 mg/dL     POC Glucose Once [[id number]]  (Abnormal) Collected: 11/30/24 1628    Specimen: Blood Updated: 11/30/24 1630     Glucose 132 mg/dL     Blood Culture ID, PCR - Blood, Arm, Left [[id number]]  (Abnormal) Collected: 11/28/24 2003    Specimen: Blood from Arm, Left Updated: 11/30/24 1339     BCID, PCR Candida glabrata. Identification by BCID2 PCR.     BOTTLE TYPE Anaerobic Bottle    Narrative:      Infectious disease consultation is highly recommended to rule out distant foci of infection.    POC Glucose Once [[id number]]  (Abnormal) Collected: 11/30/24 1113    Specimen: Blood Updated: 11/30/24 1116     Glucose 152 mg/dL     Urine Culture - Urine, Urine, Clean Catch [[id number]]  (Normal) Collected: 11/28/24 2016    Specimen: Urine, Clean Catch Updated: 11/30/24 1021     Urine Culture No growth    Basic Metabolic Panel [[id number]]  (Abnormal) Collected: 11/30/24 0659    Specimen: Blood Updated: 11/30/24 0750     Glucose 115 mg/dL      BUN 15 mg/dL      Creatinine 1.09 mg/dL      Sodium 139 mmol/L      Potassium 4.0 mmol/L      Chloride 102 mmol/L      CO2 14.7 mmol/L      Calcium 8.9 mg/dL      BUN/Creatinine Ratio 13.8     Anion Gap 22.3 mmol/L      eGFR 78.2 mL/min/1.73     Narrative:      GFR Normal >60  Chronic Kidney Disease  <60  Kidney Failure <15      CBC & Differential [[id number]]  (Abnormal) Collected: 11/30/24 0659    Specimen: Blood Updated: 11/30/24 0731    Narrative:      The following orders were created for panel order CBC & Differential.  Procedure                               Abnormality         Status                     ---------                               -----------         ------                     CBC Auto Differential[[id number]]        Abnormal            Final result                 Please view results for these tests on the individual orders.    CBC Auto Differential [[id number]]  (Abnormal) Collected: 11/30/24 0659    Specimen: Blood Updated: 11/30/24 0731     WBC 10.10 10*3/mm3      RBC 5.01 10*6/mm3      Hemoglobin 14.9 g/dL      Hematocrit 44.2 %      MCV 88.2 fL      MCH 29.7 pg      MCHC 33.7 g/dL      RDW 12.0 %      RDW-SD 38.8 fl      MPV 9.2 fL      Platelets 168 10*3/mm3      Neutrophil % 72.6 %      Lymphocyte % 9.2 %      Monocyte % 15.1 %      Eosinophil % 2.2 %      Basophil % 0.4 %      Immature Grans % 0.5 %      Neutrophils, Absolute 7.33 10*3/mm3      Lymphocytes, Absolute 0.93 10*3/mm3      Monocytes, Absolute 1.53 10*3/mm3      Eosinophils, Absolute 0.22 10*3/mm3      Basophils, Absolute 0.04 10*3/mm3      Immature Grans, Absolute 0.05 10*3/mm3      nRBC 0.0 /100 WBC     POC Glucose Once [[id number]]  (Normal) Collected: 11/30/24 0608    Specimen: Blood Updated: 11/30/24 0610     Glucose 106 mg/dL     POC Glucose Once [[id number]]  (Normal) Collected: 11/29/24 2031    Specimen: Blood Updated: 11/29/24 2033     Glucose 124 mg/dL     POC Glucose Once [[id number]]  (Abnormal) Collected: 11/29/24 1606    Specimen: Blood Updated: 11/29/24 1607     Glucose 151 mg/dL     POC Glucose Once [[id number]]  (Abnormal) Collected: 11/29/24 1115    Specimen: Blood Updated: 11/29/24 1117     Glucose 183 mg/dL     Basic Metabolic Panel [[id number]]  (Abnormal) Collected: 11/29/24 0542    Specimen: Blood Updated:  11/29/24 0702     Glucose 135 mg/dL      BUN 22 mg/dL      Creatinine 1.00 mg/dL      Sodium 138 mmol/L      Potassium 3.7 mmol/L      Chloride 106 mmol/L      CO2 21.0 mmol/L      Calcium 8.2 mg/dL      BUN/Creatinine Ratio 22.0     Anion Gap 11.0 mmol/L      eGFR 86.7 mL/min/1.73     Narrative:      GFR Normal >60  Chronic Kidney Disease <60  Kidney Failure <15      POC Glucose Once [[id number]]  (Normal) Collected: 11/29/24 0658    Specimen: Blood Updated: 11/29/24 0700     Glucose 116 mg/dL     Hemoglobin A1c [[id number]]  (Abnormal) Collected: 11/29/24 0542    Specimen: Blood Updated: 11/29/24 0648     Hemoglobin A1C 8.60 %     Narrative:      Hemoglobin A1C Ranges:    Increased Risk for Diabetes  5.7% to 6.4%  Diabetes                     >= 6.5%  Diabetic Goal                < 7.0%    CBC (No Diff) [[id number]]  (Abnormal) Collected: 11/29/24 0542    Specimen: Blood Updated: 11/29/24 0641     WBC 9.72 10*3/mm3      RBC 4.42 10*6/mm3      Hemoglobin 13.3 g/dL      Hematocrit 38.6 %      MCV 87.3 fL      MCH 30.1 pg      MCHC 34.5 g/dL      RDW 12.2 %      RDW-SD 38.8 fl      MPV 9.6 fL      Platelets 166 10*3/mm3     STAT Lactic Acid, Reflex [[id number]]  (Normal) Collected: 11/28/24 2255    Specimen: Blood Updated: 11/28/24 2315     Lactate 1.0 mmol/L     Ketone Bodies, Serum (Not performed at [place]) [[id number]]  (Normal) Collected: 11/28/24 1952    Specimen: Blood Updated: 11/28/24 2309    Narrative:      The following orders were created for panel order Ketone Bodies, Serum (Not performed at [place]).  Procedure                               Abnormality         Status                     ---------                               -----------         ------                     Beta Hydroxybutyrate Reilly...[[id number]]  Normal              Final result                 Please view results for these tests on the individual orders.    Beta Hydroxybutyrate Quantitative [[id number]]  (Normal) Collected: 11/28/24 1952     "Specimen: Blood Updated: 11/28/24 2309     Beta-Hydroxybutyrate Quant 0.155 mmol/L     Narrative:      In the assessment of possible diabetic ketoacidosis, the test should be interpreted along with other clinical and laboratory findings.  A level greater than 1 mmol/L should require further evaluation and levels of more than 3 mmol/L require immediate medical review.    POC Glucose Once [[id number]]  (Abnormal) Collected: 11/28/24 2222    Specimen: Blood Updated: 11/28/24 2233     Glucose 177 mg/dL     Procalcitonin [[id number]]  (Abnormal) Collected: 11/28/24 1952    Specimen: Blood Updated: 11/28/24 2233     Procalcitonin 0.75 ng/mL     Narrative:      As a Marker for Sepsis (Non-Neonates):    1. <0.5 ng/mL represents a low risk of severe sepsis and/or septic shock.  2. >2 ng/mL represents a high risk of severe sepsis and/or septic shock.    As a Marker for Lower Respiratory Tract Infections that require antibiotic therapy:    PCT on Admission    Antibiotic Therapy       6-12 Hrs later    >0.5                Strongly Recommended  >0.25 - <0.5        Recommended   0.1 - 0.25          Discouraged              Remeasure/reassess PCT  <0.1                Strongly Discouraged     Remeasure/reassess PCT    As 28 day mortality risk marker: \"Change in Procalcitonin Result\" (>80% or <=80%) if Day 0 (or Day 1) and Day 4 values are available. Refer to http://www.[facility]-pct-calculator.com    Change in PCT <=80%  A decrease of PCT levels below or equal to 80% defines a positive change in PCT test result representing a higher risk for 28-day all-cause mortality of patients diagnosed with severe sepsis for septic shock.    Change in PCT >80%  A decrease of PCT levels of more than 80% defines a negative change in PCT result representing a lower risk for 28-day all-cause mortality of patients diagnosed with severe sepsis or septic shock.       C-reactive Protein [[id number]]  (Abnormal) Collected: 11/28/24 1952    Specimen: Blood " Updated: 11/28/24 2225     C-Reactive Protein 10.56 mg/dL     Urinalysis, Microscopic Only - Urine, Clean Catch [[id number]]  (Abnormal) Collected: 11/28/24 2016    Specimen: Urine, Clean Catch Updated: 11/28/24 2159     RBC, UA Too Numerous to Count /HPF      WBC, UA 6-10 /HPF      Bacteria, UA None Seen /HPF      Squamous Epithelial Cells, UA 0-2 /HPF      Yeast, UA Present /HPF      Hyaline Casts, UA 3-6 /LPF      Methodology Automated Microscopy    Tell Urine Culture Tube - Urine, Clean Catch [[id number]] Collected: 11/28/24 2016    Specimen: Urine, Clean Catch Updated: 11/28/24 2149     Extra Tube Hold for add-ons.     Comment: Auto resulted.       Blood Gas, Venous - [[id number]]  (Abnormal) Collected: 11/28/24 2126    Specimen: Venous Blood Updated: 11/28/24 2128     pH, Venous 7.403 pH Units      pCO2, Venous 36.3 mm Hg      pO2, Venous 44.8 mm Hg      HCO3, Venous 22.6 mmol/L      Base Excess, Venous -1.7 mmol/L      Comment: Serial Number: [id number]  [id number]        O2 Saturation, Venous 81.0 %      CO2 Content 23.7 mmol/L      Barometric Pressure for Blood Gas 745.8000 mmHg      Modality Room Air    Magnesium [[id number]]  (Normal) Collected: 11/28/24 1953    Specimen: Blood Updated: 11/28/24 2027     Magnesium 2.1 mg/dL     Comprehensive Metabolic Panel [[id number]]  (Abnormal) Collected: 11/28/24 1953    Specimen: Blood Updated: 11/28/24 2027     Glucose 415 mg/dL      BUN 31 mg/dL      Creatinine 1.31 mg/dL      Sodium 133 mmol/L      Potassium 4.0 mmol/L      Chloride 98 mmol/L      CO2 24.7 mmol/L      Calcium 9.2 mg/dL      Total Protein 6.9 g/dL      Albumin 4.0 g/dL      ALT (SGPT) 17 U/L      AST (SGOT) 16 U/L      Alkaline Phosphatase 80 U/L      Total Bilirubin 0.8 mg/dL      Globulin 2.9 gm/dL      A/G Ratio 1.4 g/dL      BUN/Creatinine Ratio 23.7     Anion Gap 10.3 mmol/L      eGFR 62.7 mL/min/1.73     Narrative:      GFR Normal >60  Chronic Kidney Disease <60  Kidney Failure <15       COVID-19 and FLU A/B PCR, 1 HR TAT - Swab, Nasopharynx [[id number]]  (Normal) Collected: 11/28/24 2002    Specimen: Swab from Nasopharynx Updated: 11/28/24 2027     COVID19 Not Detected     Influenza A PCR Not Detected     Influenza B PCR Not Detected    Narrative:      Fact sheet for providers: https://www.fda.gov/media/057504/download    Fact sheet for patients: https://www.fda.gov/media/161204/download    Test performed by PCR.    Lipase [[id number]]  (Normal) Collected: 11/28/24 1953    Specimen: Blood Updated: 11/28/24 2025     Lipase 25 U/L     Rapid Strep A Screen - Swab, Throat [[id number]]  (Normal) Collected: 11/28/24 2002    Specimen: Swab from Throat Updated: 11/28/24 2023     STREP A PCR Not Detected    Lactic Acid, Plasma [[id number]]  (Abnormal) Collected: 11/28/24 1953    Specimen: Blood Updated: 11/28/24 2022     Lactate 2.1 mmol/L     Urinalysis With Culture If Indicated - Urine, Clean Catch [[id number]]  (Abnormal) Collected: 11/28/24 2016    Specimen: Urine, Clean Catch Updated: 11/28/24 2021     Color, UA [place]     Appearance, UA Clear     pH, UA <=5.0     Specific Gravity, UA <=1.005     Glucose, UA >=1000 mg/dL (3+)     Ketones, UA Negative     Bilirubin, UA Negative     Blood, UA Large (3+)     Protein,  mg/dL (2+)     Leuk Esterase, UA Negative     Nitrite, UA Positive     Urobilinogen, UA 0.2 E.U./dL    Narrative:      In absence of clinical symptoms, the presence of pyuria, bacteria, and/or nitrites on the urinalysis result does not correlate with infection.    [facility] [[id number]] Collected: 11/28/24 1952    Specimen: Blood Updated: 11/28/24 2015    Narrative:      The following orders were created for panel order [facility].  Procedure                               Abnormality         Status                     ---------                               -----------         ------                     Gold Top - [facility][[id number]]                                   Final result              "  Light Blue Top[[id number]]                                   Final result                 Please view results for these tests on the individual orders.    Gold Top - [facility] [[id number]] Collected: 11/28/24 1952    Specimen: Blood Updated: 11/28/24 2015     Extra Tube Hold for add-ons.     Comment: Auto resulted.       Light Blue Top [[id number]] Collected: 11/28/24 1952    Specimen: Blood Updated: 11/28/24 2015     Extra Tube Hold for add-ons.     Comment: Auto resulted       CBC & Differential [[id number]]  (Abnormal) Collected: 11/28/24 1953    Specimen: Blood Updated: 11/28/24 2001    Narrative:      The following orders were created for panel order CBC & Differential.  Procedure                               Abnormality         Status                     ---------                               -----------         ------                     CBC Auto Differential[[id number]]        Abnormal            Final result                 Please view results for these tests on the individual orders.    CBC Auto Differential [[id number]]  (Abnormal) Collected: 11/28/24 1953    Specimen: Blood Updated: 11/28/24 2001     WBC 8.40 10*3/mm3      RBC 5.08 10*6/mm3      Hemoglobin 15.2 g/dL      Hematocrit 45.0 %      MCV 88.6 fL      MCH 29.9 pg      MCHC 33.8 g/dL      RDW 12.3 %      RDW-SD 40.5 fl      MPV 9.5 fL      Platelets 165 10*3/mm3      Neutrophil % 75.9 %      Lymphocyte % 8.3 %      Monocyte % 9.9 %      Eosinophil % 5.6 %      Basophil % 0.2 %      Immature Grans % 0.1 %      Neutrophils, Absolute 6.37 10*3/mm3      Lymphocytes, Absolute 0.70 10*3/mm3      Monocytes, Absolute 0.83 10*3/mm3      Eosinophils, Absolute 0.47 10*3/mm3      Basophils, Absolute 0.02 10*3/mm3      Immature Grans, Absolute 0.01 10*3/mm3           /81 (BP Location: Left arm, Patient Position: Lying)   Pulse 85   Temp 97.5 °F (36.4 °C) (Oral)   Resp 18   Ht 182 cm (71.65\")   Wt 83.6 kg (184 lb 4.8 oz)   SpO2 95%   BMI 25.24 kg/m² "     Discharge Exam:  General Appearance:    Alert, cooperative, no distress                          Head:    Normocephalic, without obvious abnormality, atraumatic                          Eyes:                            Throat:   Lips, tongue, gums normal                          Neck:   Supple, symmetrical, trachea midline, no JVD                        Lungs:     Clear to auscultation bilaterally, respirations unlabored                Chest Wall:    No tenderness or deformity                        Heart:    Regular rate and rhythm, S1 and S2 normal, no murmur,no  Rub  or gallop                  Abdomen:     Soft, non-tender, bowel sounds active, no masses, no organomegaly                  Extremities:   Extremities normal, atraumatic, no cyanosis or edema                             Skin:   Skin is warm and dry,  no rashes or palpable lesions                  Neurologic:   no focal deficits noted     Disposition:  Home    Activity as tolerated    Diet as tolerated  Diet Order   Procedures    Diet: Cardiac, Diabetic; Healthy Heart (2-3 Na+); Consistent Carbohydrate; Fluid Consistency: Thin (IDDSI 0)       Patient Instructions:      Discharge Medications        New Medications        Instructions Start Date   amLODIPine 5 MG tablet  Commonly known as: NORVASC   5 mg, Oral, Every 24 Hours Scheduled   Start Date: December 4, 2024     fluconazole 200 MG tablet  Commonly known as: DIFLUCAN   800 mg, Oral, Every 24 Hours   Start Date: December 4, 2024     methocarbamol 750 MG tablet  Commonly known as: ROBAXIN   750 mg, Oral, 3 Times Daily PRN      tamsulosin 0.4 MG capsule 24 hr capsule  Commonly known as: FLOMAX   0.4 mg, Oral, Daily   Start Date: December 4, 2024            Continue These Medications        Instructions Start Date   amphetamine-dextroamphetamine XR 30 MG 24 hr capsule  Commonly known as: Adderall XR   30 mg, Oral, Every Morning, ADHD      glucose blood test strip   Test blood sugar once daily       metFORMIN  MG 24 hr tablet  Commonly known as: GLUCOPHAGE-XR   TAKE TWO TABLETS BY MOUTH TWICE DAILY      nabumetone 750 MG tablet  Commonly known as: RELAFEN   TAKE ONE TABLET BY MOUTH TWICE DAILY      oxyCODONE-acetaminophen 7.5-325 MG per tablet  Commonly known as: PERCOCET   1 tablet, Every 6 Hours PRN      rosuvastatin 20 MG tablet  Commonly known as: CRESTOR   20 mg, Oral, Daily      sildenafil 100 MG tablet  Commonly known as: VIAGRA   100 mg, Oral, As Needed      SITagliptin 100 MG tablet  Commonly known as: JANUVIA   100 mg, Oral, Daily             Stop These Medications      Claritin-D 24 Hour  MG per 24 hr tablet  Generic drug: loratadine-pseudoephedrine     empagliflozin 25 MG tablet tablet  Commonly known as: JARDIANCE            No future appointments.   Follow-up Information       Ta Estevez MD Follow up in 1 week(s).    Specialty: Internal Medicine  Contact information:  [facility] [id number] [phone]                           Discharge Order (From admission, onward)       Start     Ordered    12/03/24 1237  Discharge patient  Once        Expected Discharge Date: 12/03/24   Discharge Disposition: Home or Self Care   Physician of Record for Attribution - Please select from Treatment Team: KRAIG LAO [[id number]]   Review needed by CMO to determine Physician of Record: No      Question Answer Comment   Physician of Record for Attribution - Please select from Treatment Team KRAIG LAO    Review needed by CMO to determine Physician of Record No        12/03/24 1241                    Total time spent discharging patient including evaluation,post hospitalization follow up,  medication and post hospitalization instructions and education total time exceeds 30 minutes.    Signed:  Kraig Lao MD  12/3/2024  12:45 EST

## 2024-12-03 NOTE — PROGRESS NOTES
Case Management Discharge Note      Final Note: DC home no needs         Selected Continued Care - Discharged on 12/3/2024 Admission date: 11/28/2024 - Discharge disposition: Home or Self Care      Destination    No services have been selected for the patient.                Durable Medical Equipment    No services have been selected for the patient.                Dialysis/Infusion    No services have been selected for the patient.                Home Medical Care    No services have been selected for the patient.                Therapy    No services have been selected for the patient.                Community Resources    No services have been selected for the patient.                Community & DME    No services have been selected for the patient.                         Final Discharge Disposition Code: 01 - home or self-care

## 2024-12-03 NOTE — PLAN OF CARE
Goal Outcome Evaluation:  Plan of Care Reviewed With: patient        Progress: improving  Outcome Evaluation: VSS. No complaints of pain. Given dulcolax PO with good resulys. Appetitegood. Antibiotics changed to PO. Discharge instructions and education sheets provided. Pt discharged to home with family.

## 2024-12-04 LAB
BACTERIA SPEC AEROBE CULT: ABNORMAL
BACTERIA SPEC AEROBE CULT: ABNORMAL
GRAM STN SPEC: ABNORMAL
ISOLATED FROM: ABNORMAL
ISOLATED FROM: ABNORMAL

## 2024-12-04 NOTE — OUTREACH NOTE
Prep Survey      Flowsheet Row Responses   [facility] facility patient discharged from? [facility]   Is LACE score < 7 ? No   Eligibility Readm Mgmt   Discharge diagnosis sepsis, Acute UTI   Does the patient have one of the following disease processes/diagnoses(primary or secondary)? Sepsis   Does the patient have Home health ordered? No   Is there a DME ordered? No   Prep survey completed? Yes            Lillian ANTOINE - Registered Nurse

## 2024-12-05 NOTE — PAYOR COMM NOTE
"Ronak Dejesus \"Nikko\" (59 y.o. Male)        PLEASE SEE ATTACHED DC SUMMARY    REF#[id number]    THANK YOU    CORRY HARTMAN        Date of Birth   1965    Social Security Number       Address   [address] [place] [address]    Home Phone   [phone]    MRN   [id number]       [facility]    Marital Status                               Admission Date   11/28/24    Admission Type   Urgent    Admitting Provider   Miryam Fam DO    Attending Provider       Department, Room/Bed   [address], S610/1       Discharge Date   12/3/2024    Discharge Disposition   Home or Self Care    Discharge Destination                                 Attending Provider: (none)   Allergies: No Known Allergies    Isolation: None   Infection: None   Code Status: Prior    Ht: 182 cm (71.65\")   Wt: 83.6 kg (184 lb 4.8 oz)    Admission Cmt: None   Principal Problem: Sepsis [A41.9]                   Active Insurance as of 11/28/2024       Primary Coverage       Payor Plan Insurance Group Employer/Plan Group    [facility] [employer] [facility] PPO [id number]       Payor Plan Address Payor Plan Phone Number Payor Plan Fax Number Effective Dates    [phone]  9/16/2021 - None Entered    [facility] [id number]         Subscriber Name Subscriber Birth Date Member ID       RONAK DEJESUS 1965 [id number]                     Emergency Contacts        (Rel.) Home Phone Work Phone Mobile Phone    EnglishMalkamirza (Spouse) [phone] -- --    Heath Dejesus (Son) -- -- [phone]    Rodolfo Dejesus (Son) -- -- [phone]                 Discharge Summary        Kraig Lao MD at 12/03/24 1241                                                                             PHYSICIAN DISCHARGE SUMMARY                                                                        [facility]    Patient Identification:  Name: Ronak OAKES "   Age: 59 y.o.  Sex: male  :  1965  MRN: [id number]  Primary Care Physician: Ta Estevez MD    Admit date: 2024  Discharge date and time:12/3/2024  Discharged Condition: good    Discharge Diagnoses:  Active Hospital Problems    Diagnosis  POA    **Sepsis [A41.9]  Yes    Candida glabrata infection [B37.9]  Unknown    Fungemia [B49]  Unknown    Acute pyelonephritis [N10]  Unknown    Acute UTI (urinary tract infection) [N39.0]  Yes    Type 2 diabetes mellitus with hyperglycemia, without long-term current use of insulin [E11.65]  Yes    BEAR (obstructive sleep apnea) [G47.33]  Yes      Resolved Hospital Problems   No resolved problems to display.          PMHX:   Past Medical History:   Diagnosis Date    Acute pyelonephritis 12/3/2024    Acute UTI (urinary tract infection) 2024    Diabetes mellitus     Elevated PSA     Erectile dysfunction     Hormone disorder     i've been taking testosterone supplements    Kidney stone     Kidney stone 2022     PSHX:   Past Surgical History:   Procedure Laterality Date    CYSTOSCOPY W/ URETERAL STENT PLACEMENT Left 2024    Procedure: CYSTOSCOPY URETERAL CATHETER/STENT INSERTION;  Surgeon: Ayush Waldron MD;  Location: [facility];  Service: Urology;  Laterality: Left;    EXTRACORPOREAL SHOCK WAVE LITHOTRIPSY (ESWL) Left 2024    Procedure: EXTRACORPOREAL SHOCKWAVE LITHOTRIPSY;  Surgeon: Ayush Waldron MD;  Location: [facility];  Service: Urology;  Laterality: Left;    PROSTATE ULTRASOUND BIOPSY  2017    negative    VASECTOMY         Hospital Course: Maurizio Dejesus  is a  59-year-old male with a past medical history of type 2 diabetes, kidney stone, erectile dysfunction, and obstructive sleep apnea presents to [facility] ED with complaints of hyperglycemia.  Patient reports lithotripsy and ureteroscopy with a right sided stent placement on 2024.  Patient noted fever of 102.9 on arrival to the ED.   Patient noted to be having chills and not able to get comfortable in the bed.  Denies shortness of breath, chest pain, nausea, vomiting, diarrhea, or difficulty urinating.  He does report some right lower quadrant pain, and family member reports that it has been a significant amount of time since he has had any pain medicine.  The patient was admitted to the hospital and seen by urology and infectious disease.  Blood cultures were growing Candida glabrata.  Patient was on micafungin for few days and looked well enough to go home.  He will finish a course of Diflucan and follow-up with his primary care in 1 week for ongoing care and also follow-up with urology.  Pharmacy had recommended that he hold the Jardiance while he is taking high dose of Diflucan due to drug interaction which may cause QT prolongation.    Consults:     Consults       Date and Time Order Name Status Description    11/30/2024  2:01 PM Inpatient Infectious Diseases Consult Completed     11/29/2024 11:41 AM Inpatient Urology Consult Completed           Results from last 7 days   Lab Units 12/02/24  0552   WBC 10*3/mm3 11.15*   HEMOGLOBIN g/dL 14.8   HEMATOCRIT % 42.7   PLATELETS 10*3/mm3 213     Results from last 7 days   Lab Units 12/02/24  0552   SODIUM mmol/L 135*   POTASSIUM mmol/L 4.2   CHLORIDE mmol/L 104   CO2 mmol/L 8.9*   BUN mg/dL 23*   CREATININE mg/dL 1.19   GLUCOSE mg/dL 191*   CALCIUM mg/dL 9.2     Significant Diagnostic Studies:   WBC   Date Value Ref Range Status   12/02/2024 11.15 (H) 3.40 - 10.80 10*3/mm3 Final     Hemoglobin   Date Value Ref Range Status   12/02/2024 14.8 13.0 - 17.7 g/dL Final     Hematocrit   Date Value Ref Range Status   12/02/2024 42.7 37.5 - 51.0 % Final     Platelets   Date Value Ref Range Status   12/02/2024 213 140 - 450 10*3/mm3 Final     Sodium   Date Value Ref Range Status   12/02/2024 135 (L) 136 - 145 mmol/L Final     Potassium   Date Value Ref Range Status   12/02/2024 4.2 3.5 - 5.2 mmol/L Final  "    Chloride   Date Value Ref Range Status   12/02/2024 104 98 - 107 mmol/L Final     CO2   Date Value Ref Range Status   12/02/2024 8.9 (C) 22.0 - 29.0 mmol/L Final     BUN   Date Value Ref Range Status   12/02/2024 23 (H) 6 - 20 mg/dL Final     Creatinine   Date Value Ref Range Status   12/02/2024 1.19 0.76 - 1.27 mg/dL Final     Glucose   Date Value Ref Range Status   12/02/2024 191 (H) 65 - 99 mg/dL Final     Calcium   Date Value Ref Range Status   12/02/2024 9.2 8.6 - 10.5 mg/dL Final     No results found for: \"AST\", \"ALT\", \"ALKPHOS\"  No results found for: \"APTT\", \"INR\"  No results found for: \"COLORU\", \"CLARITYU\", \"SPECGRAV\", \"PHUR\", \"PROTEINUR\", \"GLUCOSEU\", \"KETONESU\", \"BLOODU\", \"NITRITE\", \"LEUKOCYTESUR\", \"BILIRUBINUR\", \"UROBILINOGEN\", \"RBCUA\", \"WBCUA\", \"BACTERIA\", \"UACOMMENT\"  No results found for: \"TROPONINT\", \"TROPONINI\", \"BNP\"  No components found for: \"HGBA1C;2\"  No components found for: \"TSH;2\"  Imaging Results (All)       Procedure Component Value Units Date/Time    CT Abdomen Pelvis With Contrast [[id number]] Collected: 11/28/24 2118     Updated: 11/28/24 2136    Narrative:      CT OF THE ABDOMEN PELVIS WITH CONTRAST     HISTORY: Sepsis     COMPARISON: August 30, 2024     TECHNIQUE: Axial CT imaging was obtained through the abdomen and pelvis.  IV contrast was administered.     FINDINGS:  Images through the lung bases demonstrate dependent atelectasis. A few  tiny cysts are noted within the liver. There is a small hiatal hernia.  The duodenum, adrenal glands, and spleen are normal. There is some  minimal pancreatic atrophy. Gallbladder is normal. The patient has a  double-J ureteral stent noted on the right. No hydronephrosis is seen.  There is some perinephric stranding which is noted on the right, as well  as heterogeneous enhancement of the right kidney, suggesting  pyelonephritis. There is a stable 5 mm nonobstructing stone within the  right kidney. A previously noted small staghorn calculus within " the  right kidney is no longer identified. The patient does have a small  stone fragment within the urinary bladder. No stones are noted on the  left. There is no hydronephrosis on the left. There is air within the  urinary bladder, in keeping with recent procedure. The prostate gland is  enlarged and contains dystrophic calcifications. There is no bowel  obstruction. There is colonic diverticulosis. The appendix is normal. No  acute osseous abnormalities are seen. There are small bilateral  fat-containing inguinal hernias.       Impression:      The patient has undergone placement of a double-J ureteral stent on the  right, as well as right-sided lithotripsy. There is no hydronephrosis,  but the patient does have perinephric stranding and heterogeneous  enhancement of the right kidney, concerning for pyelonephritis.     Radiation dose reduction techniques were utilized, including automated  exposure control and exposure modulation based on body size.        This report was finalized on 11/28/2024 9:33 PM by Dr. Anel Recinos M.D on Workstation: [email or web address] [id number]       XR Chest 1 View [[id number]] Collected: 11/28/24 2043     Updated: 11/28/24 2046    Narrative:      SINGLE VIEW OF THE CHEST     HISTORY: Fever     COMPARISON: None available.     FINDINGS:  Heart size is within normal limits. No pneumothorax, pleural effusion,  or acute infiltrate is seen.       Impression:      No acute findings.     This report was finalized on 11/28/2024 8:43 PM by Dr. Anel Recinos M.D on Workstation: [id number]             Lab Results (last 7 days)       Procedure Component Value Units Date/Time    POC Glucose Once [[id number]]  (Abnormal) Collected: 12/03/24 1105    Specimen: Blood Updated: 12/03/24 1106     Glucose 237 mg/dL     Blood Culture - Blood, Arm, Right [[id number]]  (Normal) Collected: 12/01/24 0742    Specimen: Blood from Arm, Right Updated: 12/03/24 0845     Blood Culture No growth at 2 days    Blood Culture -  Blood, Arm, Left [[id number]]  (Normal) Collected: 12/01/24 0751    Specimen: Blood from Arm, Left Updated: 12/03/24 0845     Blood Culture No growth at 2 days    Blood Culture - Blood, Arm, Right [[id number]]  (Abnormal) Collected: 11/28/24 2003    Specimen: Blood from Arm, Right Updated: 12/03/24 0640     Blood Culture No growth at 4 days      Candida glabrata     Comment:   Infectious disease consultation is highly recommended to rule out distant foci of infection.        Isolated from Aerobic Bottle     Gram Stain Aerobic Bottle Yeast      Anaerobic Bottle Yeast    Narrative:      Refer to previous blood culture collected on 11/28/2024 2003 for MICs    POC Glucose Once [[id number]]  (Abnormal) Collected: 12/03/24 0637    Specimen: Blood Updated: 12/03/24 0638     Glucose 157 mg/dL     Blood Culture - Blood, Arm, Left [[id number]]  (Abnormal)  (Susceptibility) Collected: 11/28/24 2003    Specimen: Blood from Arm, Left Updated: 12/02/24 2352     Blood Culture No growth at 4 days      Candida glabrata     Comment:   Infectious disease consultation is highly recommended to rule out distant foci of infection.        Isolated from Anaerobic Bottle     Gram Stain Anaerobic Bottle Yeast      Aerobic Bottle Yeast     Comment: Bottle Reloaded       Susceptibility        Candida glabrata      PATRICK Not Specified      Fluconazole  Susceptible dose dependent  [1]       Micafungin Susceptible                      [1]  Fluconazole is susceptible with the use of higher dose fluconazole (12 mg/kg daily or 800 mg daily).                    POC Glucose Once [[id number]]  (Abnormal) Collected: 12/02/24 2036    Specimen: Blood Updated: 12/02/24 2038     Glucose 179 mg/dL     POC Glucose Once [[id number]]  (Abnormal) Collected: 12/02/24 1607    Specimen: Blood Updated: 12/02/24 1618     Glucose 147 mg/dL     POC Glucose Once [[id number]]  (Abnormal) Collected: 12/02/24 1130    Specimen: Blood Updated: 12/02/24 1133     Glucose 179 mg/dL      Basic Metabolic Panel [[id number]]  (Abnormal) Collected: 12/02/24 0552    Specimen: Blood Updated: 12/02/24 0650     Glucose 191 mg/dL      BUN 23 mg/dL      Creatinine 1.19 mg/dL      Sodium 135 mmol/L      Potassium 4.2 mmol/L      Chloride 104 mmol/L      CO2 8.9 mmol/L      Calcium 9.2 mg/dL      BUN/Creatinine Ratio 19.3     Anion Gap 22.1 mmol/L      eGFR 70.4 mL/min/1.73     Narrative:      GFR Normal >60  Chronic Kidney Disease <60  Kidney Failure <15      CBC & Differential [[id number]]  (Abnormal) Collected: 12/02/24 0552    Specimen: Blood Updated: 12/02/24 0621    Narrative:      The following orders were created for panel order CBC & Differential.  Procedure                               Abnormality         Status                     ---------                               -----------         ------                     CBC Auto Differential[[id number]]        Abnormal            Final result                 Please view results for these tests on the individual orders.    CBC Auto Differential [[id number]]  (Abnormal) Collected: 12/02/24 0552    Specimen: Blood Updated: 12/02/24 0621     WBC 11.15 10*3/mm3      RBC 4.83 10*6/mm3      Hemoglobin 14.8 g/dL      Hematocrit 42.7 %      MCV 88.4 fL      MCH 30.6 pg      MCHC 34.7 g/dL      RDW 12.3 %      RDW-SD 39.7 fl      MPV 9.3 fL      Platelets 213 10*3/mm3      Neutrophil % 74.8 %      Lymphocyte % 7.0 %      Monocyte % 15.0 %      Eosinophil % 2.3 %      Basophil % 0.4 %      Immature Grans % 0.5 %      Neutrophils, Absolute 8.33 10*3/mm3      Lymphocytes, Absolute 0.78 10*3/mm3      Monocytes, Absolute 1.67 10*3/mm3      Eosinophils, Absolute 0.26 10*3/mm3      Basophils, Absolute 0.05 10*3/mm3      Immature Grans, Absolute 0.06 10*3/mm3      nRBC 0.0 /100 WBC     POC Glucose Once [[id number]]  (Abnormal) Collected: 12/02/24 0614    Specimen: Blood Updated: 12/02/24 0616     Glucose 158 mg/dL     POC Glucose Once [[id number]]  (Abnormal) Collected:  12/01/24 2023    Specimen: Blood Updated: 12/01/24 2024     Glucose 200 mg/dL     POC Glucose Once [[id number]]  (Abnormal) Collected: 12/01/24 1616    Specimen: Blood Updated: 12/01/24 1617     Glucose 171 mg/dL     POC Glucose Once [[id number]]  (Abnormal) Collected: 12/01/24 1109    Specimen: Blood Updated: 12/01/24 1112     Glucose 214 mg/dL     Basic Metabolic Panel [[id number]]  (Abnormal) Collected: 12/01/24 0751    Specimen: Blood Updated: 12/01/24 0940     Glucose 182 mg/dL      BUN 21 mg/dL      Creatinine 1.25 mg/dL      Sodium 136 mmol/L      Potassium 4.5 mmol/L      Chloride 103 mmol/L      CO2 7.8 mmol/L      Calcium 9.2 mg/dL      BUN/Creatinine Ratio 16.8     Anion Gap 25.2 mmol/L      eGFR 66.3 mL/min/1.73     Narrative:      GFR Normal >60  Chronic Kidney Disease <60  Kidney Failure <15      CBC & Differential [[id number]]  (Abnormal) Collected: 12/01/24 0751    Specimen: Blood Updated: 12/01/24 0845    Narrative:      The following orders were created for panel order CBC & Differential.  Procedure                               Abnormality         Status                     ---------                               -----------         ------                     CBC Auto Differential[[id number]]        Abnormal            Final result                 Please view results for these tests on the individual orders.    CBC Auto Differential [[id number]]  (Abnormal) Collected: 12/01/24 0751    Specimen: Blood Updated: 12/01/24 0845     WBC 11.53 10*3/mm3      RBC 4.79 10*6/mm3      Hemoglobin 14.6 g/dL      Hematocrit 43.0 %      MCV 89.8 fL      MCH 30.5 pg      MCHC 34.0 g/dL      RDW 12.4 %      RDW-SD 40.4 fl      MPV 9.4 fL      Platelets 208 10*3/mm3      Neutrophil % 77.7 %      Lymphocyte % 5.3 %      Monocyte % 15.6 %      Eosinophil % 0.7 %      Basophil % 0.3 %      Immature Grans % 0.4 %      Neutrophils, Absolute 8.95 10*3/mm3      Lymphocytes, Absolute 0.61 10*3/mm3      Monocytes, Absolute 1.80  10*3/mm3      Eosinophils, Absolute 0.08 10*3/mm3      Basophils, Absolute 0.04 10*3/mm3      Immature Grans, Absolute 0.05 10*3/mm3      nRBC 0.0 /100 WBC     POC Glucose Once [[id number]]  (Abnormal) Collected: 12/01/24 0604    Specimen: Blood Updated: 12/01/24 0605     Glucose 174 mg/dL     POC Glucose Once [[id number]]  (Abnormal) Collected: 11/30/24 2107    Specimen: Blood Updated: 11/30/24 2108     Glucose 136 mg/dL     POC Glucose Once [[id number]]  (Abnormal) Collected: 11/30/24 1628    Specimen: Blood Updated: 11/30/24 1630     Glucose 132 mg/dL     Blood Culture ID, PCR - Blood, Arm, Left [[id number]]  (Abnormal) Collected: 11/28/24 2003    Specimen: Blood from Arm, Left Updated: 11/30/24 1339     BCID, PCR Candida glabrata. Identification by BCID2 PCR.     BOTTLE TYPE Anaerobic Bottle    Narrative:      Infectious disease consultation is highly recommended to rule out distant foci of infection.    POC Glucose Once [[id number]]  (Abnormal) Collected: 11/30/24 1113    Specimen: Blood Updated: 11/30/24 1116     Glucose 152 mg/dL     Urine Culture - Urine, Urine, Clean Catch [[id number]]  (Normal) Collected: 11/28/24 2016    Specimen: Urine, Clean Catch Updated: 11/30/24 1021     Urine Culture No growth    Basic Metabolic Panel [[id number]]  (Abnormal) Collected: 11/30/24 0659    Specimen: Blood Updated: 11/30/24 0750     Glucose 115 mg/dL      BUN 15 mg/dL      Creatinine 1.09 mg/dL      Sodium 139 mmol/L      Potassium 4.0 mmol/L      Chloride 102 mmol/L      CO2 14.7 mmol/L      Calcium 8.9 mg/dL      BUN/Creatinine Ratio 13.8     Anion Gap 22.3 mmol/L      eGFR 78.2 mL/min/1.73     Narrative:      GFR Normal >60  Chronic Kidney Disease <60  Kidney Failure <15      CBC & Differential [[id number]]  (Abnormal) Collected: 11/30/24 0659    Specimen: Blood Updated: 11/30/24 0731    Narrative:      The following orders were created for panel order CBC & Differential.  Procedure                               Abnormality          Status                     ---------                               -----------         ------                     CBC Auto Differential[[id number]]        Abnormal            Final result                 Please view results for these tests on the individual orders.    CBC Auto Differential [[id number]]  (Abnormal) Collected: 11/30/24 0659    Specimen: Blood Updated: 11/30/24 0731     WBC 10.10 10*3/mm3      RBC 5.01 10*6/mm3      Hemoglobin 14.9 g/dL      Hematocrit 44.2 %      MCV 88.2 fL      MCH 29.7 pg      MCHC 33.7 g/dL      RDW 12.0 %      RDW-SD 38.8 fl      MPV 9.2 fL      Platelets 168 10*3/mm3      Neutrophil % 72.6 %      Lymphocyte % 9.2 %      Monocyte % 15.1 %      Eosinophil % 2.2 %      Basophil % 0.4 %      Immature Grans % 0.5 %      Neutrophils, Absolute 7.33 10*3/mm3      Lymphocytes, Absolute 0.93 10*3/mm3      Monocytes, Absolute 1.53 10*3/mm3      Eosinophils, Absolute 0.22 10*3/mm3      Basophils, Absolute 0.04 10*3/mm3      Immature Grans, Absolute 0.05 10*3/mm3      nRBC 0.0 /100 WBC     POC Glucose Once [[id number]]  (Normal) Collected: 11/30/24 0608    Specimen: Blood Updated: 11/30/24 0610     Glucose 106 mg/dL     POC Glucose Once [[id number]]  (Normal) Collected: 11/29/24 2031    Specimen: Blood Updated: 11/29/24 2033     Glucose 124 mg/dL     POC Glucose Once [[id number]]  (Abnormal) Collected: 11/29/24 1606    Specimen: Blood Updated: 11/29/24 1607     Glucose 151 mg/dL     POC Glucose Once [[id number]]  (Abnormal) Collected: 11/29/24 1115    Specimen: Blood Updated: 11/29/24 1117     Glucose 183 mg/dL     Basic Metabolic Panel [[id number]]  (Abnormal) Collected: 11/29/24 0542    Specimen: Blood Updated: 11/29/24 0702     Glucose 135 mg/dL      BUN 22 mg/dL      Creatinine 1.00 mg/dL      Sodium 138 mmol/L      Potassium 3.7 mmol/L      Chloride 106 mmol/L      CO2 21.0 mmol/L      Calcium 8.2 mg/dL      BUN/Creatinine Ratio 22.0     Anion Gap 11.0 mmol/L      eGFR 86.7 mL/min/1.73      Narrative:      GFR Normal >60  Chronic Kidney Disease <60  Kidney Failure <15      POC Glucose Once [[id number]]  (Normal) Collected: 11/29/24 0658    Specimen: Blood Updated: 11/29/24 0700     Glucose 116 mg/dL     Hemoglobin A1c [[id number]]  (Abnormal) Collected: 11/29/24 0542    Specimen: Blood Updated: 11/29/24 0648     Hemoglobin A1C 8.60 %     Narrative:      Hemoglobin A1C Ranges:    Increased Risk for Diabetes  5.7% to 6.4%  Diabetes                     >= 6.5%  Diabetic Goal                < 7.0%    CBC (No Diff) [[id number]]  (Abnormal) Collected: 11/29/24 0542    Specimen: Blood Updated: 11/29/24 0641     WBC 9.72 10*3/mm3      RBC 4.42 10*6/mm3      Hemoglobin 13.3 g/dL      Hematocrit 38.6 %      MCV 87.3 fL      MCH 30.1 pg      MCHC 34.5 g/dL      RDW 12.2 %      RDW-SD 38.8 fl      MPV 9.6 fL      Platelets 166 10*3/mm3     STAT Lactic Acid, Reflex [[id number]]  (Normal) Collected: 11/28/24 2255    Specimen: Blood Updated: 11/28/24 2315     Lactate 1.0 mmol/L     Ketone Bodies, Serum (Not performed at [facility]) [[id number]]  (Normal) Collected: 11/28/24 1952    Specimen: Blood Updated: 11/28/24 2309    Narrative:      The following orders were created for panel order Ketone Bodies, Serum (Not performed at [facility]).  Procedure                               Abnormality         Status                     ---------                               -----------         ------                     Beta Hydroxybutyrate Reilly...[[id number]]  Normal              Final result                 Please view results for these tests on the individual orders.    Beta Hydroxybutyrate Quantitative [[id number]]  (Normal) Collected: 11/28/24 1952    Specimen: Blood Updated: 11/28/24 2309     Beta-Hydroxybutyrate Quant 0.155 mmol/L     Narrative:      In the assessment of possible diabetic ketoacidosis, the test should be interpreted along with other clinical and laboratory findings.  A level greater than 1 mmol/L should require  "further evaluation and levels of more than 3 mmol/L require immediate medical review.    POC Glucose Once [[id number]]  (Abnormal) Collected: 11/28/24 2222    Specimen: Blood Updated: 11/28/24 2233     Glucose 177 mg/dL     Procalcitonin [[id number]]  (Abnormal) Collected: 11/28/24 1952    Specimen: Blood Updated: 11/28/24 2233     Procalcitonin 0.75 ng/mL     Narrative:      As a Marker for Sepsis (Non-Neonates):    1. <0.5 ng/mL represents a low risk of severe sepsis and/or septic shock.  2. >2 ng/mL represents a high risk of severe sepsis and/or septic shock.    As a Marker for Lower Respiratory Tract Infections that require antibiotic therapy:    PCT on Admission    Antibiotic Therapy       6-12 Hrs later    >0.5                Strongly Recommended  >0.25 - <0.5        Recommended   0.1 - 0.25          Discouraged              Remeasure/reassess PCT  <0.1                Strongly Discouraged     Remeasure/reassess PCT    As 28 day mortality risk marker: \"Change in Procalcitonin Result\" (>80% or <=80%) if Day 0 (or Day 1) and Day 4 values are available. Refer to http://www.[facility]-pct-calculator.com    Change in PCT <=80%  A decrease of PCT levels below or equal to 80% defines a positive change in PCT test result representing a higher risk for 28-day all-cause mortality of patients diagnosed with severe sepsis for septic shock.    Change in PCT >80%  A decrease of PCT levels of more than 80% defines a negative change in PCT result representing a lower risk for 28-day all-cause mortality of patients diagnosed with severe sepsis or septic shock.       C-reactive Protein [[id number]]  (Abnormal) Collected: 11/28/24 1952    Specimen: Blood Updated: 11/28/24 2225     C-Reactive Protein 10.56 mg/dL     Urinalysis, Microscopic Only - Urine, Clean Catch [[id number]]  (Abnormal) Collected: 11/28/24 2016    Specimen: Urine, Clean Catch Updated: 11/28/24 2159     RBC, UA Too Numerous to Count /HPF      WBC, UA 6-10 /HPF      " Bacteria, UA None Seen /HPF      Squamous Epithelial Cells, UA 0-2 /HPF      Yeast, UA Present /HPF      Hyaline Casts, UA 3-6 /LPF      Methodology Automated Microscopy    Nitro Urine Culture Tube - Urine, Clean Catch [[id number]] Collected: 11/28/24 2016    Specimen: Urine, Clean Catch Updated: 11/28/24 2149     Extra Tube Hold for add-ons.     Comment: Auto resulted.       Blood Gas, Venous - [[id number]]  (Abnormal) Collected: 11/28/24 2126    Specimen: Venous Blood Updated: 11/28/24 2128     pH, Venous 7.403 pH Units      pCO2, Venous 36.3 mm Hg      pO2, Venous 44.8 mm Hg      HCO3, Venous 22.6 mmol/L      Base Excess, Venous -1.7 mmol/L      Comment: Serial Number: [id number]:  [id number]        O2 Saturation, Venous 81.0 %      CO2 Content 23.7 mmol/L      Barometric Pressure for Blood Gas 745.8000 mmHg      Modality Room Air    Magnesium [[id number]]  (Normal) Collected: 11/28/24 1953    Specimen: Blood Updated: 11/28/24 2027     Magnesium 2.1 mg/dL     Comprehensive Metabolic Panel [[id number]]  (Abnormal) Collected: 11/28/24 1953    Specimen: Blood Updated: 11/28/24 2027     Glucose 415 mg/dL      BUN 31 mg/dL      Creatinine 1.31 mg/dL      Sodium 133 mmol/L      Potassium 4.0 mmol/L      Chloride 98 mmol/L      CO2 24.7 mmol/L      Calcium 9.2 mg/dL      Total Protein 6.9 g/dL      Albumin 4.0 g/dL      ALT (SGPT) 17 U/L      AST (SGOT) 16 U/L      Alkaline Phosphatase 80 U/L      Total Bilirubin 0.8 mg/dL      Globulin 2.9 gm/dL      A/G Ratio 1.4 g/dL      BUN/Creatinine Ratio 23.7     Anion Gap 10.3 mmol/L      eGFR 62.7 mL/min/1.73     Narrative:      GFR Normal >60  Chronic Kidney Disease <60  Kidney Failure <15      COVID-19 and FLU A/B PCR, 1 HR TAT - Swab, Nasopharynx [[id number]]  (Normal) Collected: 11/28/24 2002    Specimen: Swab from Nasopharynx Updated: 11/28/24 2027     COVID19 Not Detected     Influenza A PCR Not Detected     Influenza B PCR Not Detected    Narrative:      Fact sheet for  providers: https://www.fda.gov/media/402876/download    Fact sheet for patients: https://www.fda.gov/media/223200/download    Test performed by PCR.    Lipase [[id number]]  (Normal) Collected: 11/28/24 1953    Specimen: Blood Updated: 11/28/24 2025     Lipase 25 U/L     Rapid Strep A Screen - Swab, Throat [[id number]]  (Normal) Collected: 11/28/24 2002    Specimen: Swab from Throat Updated: 11/28/24 2023     STREP A PCR Not Detected    Lactic Acid, Plasma [[id number]]  (Abnormal) Collected: 11/28/24 1953    Specimen: Blood Updated: 11/28/24 2022     Lactate 2.1 mmol/L     Urinalysis With Culture If Indicated - Urine, Clean Catch [[id number]]  (Abnormal) Collected: 11/28/24 2016    Specimen: Urine, Clean Catch Updated: 11/28/24 2021     Color, UA [place]     Appearance, UA Clear     pH, UA <=5.0     Specific Gravity, UA <=1.005     Glucose, UA >=1000 mg/dL (3+)     Ketones, UA Negative     Bilirubin, UA Negative     Blood, UA Large (3+)     Protein,  mg/dL (2+)     Leuk Esterase, UA Negative     Nitrite, UA Positive     Urobilinogen, UA 0.2 E.U./dL    Narrative:      In absence of clinical symptoms, the presence of pyuria, bacteria, and/or nitrites on the urinalysis result does not correlate with infection.    [place] Draw [[id number]] Collected: 11/28/24 1952    Specimen: Blood Updated: 11/28/24 2015    Narrative:      The following orders were created for panel order [place] Draw.  Procedure                               Abnormality         Status                     ---------                               -----------         ------                     Gold Top - SST[[id number]]                                   Final result               Light Blue Top[[id number]]                                   Final result                 Please view results for these tests on the individual orders.    Gold Top - SST [[id number]] Collected: 11/28/24 1952    Specimen: Blood Updated: 11/28/24 2015     Extra Tube Hold for add-ons.  "    Comment: Auto resulted.       Light Blue Top [[id number]] Collected: 11/28/24 1952    Specimen: Blood Updated: 11/28/24 2015     Extra Tube Hold for add-ons.     Comment: Auto resulted       CBC & Differential [[id number]]  (Abnormal) Collected: 11/28/24 1953    Specimen: Blood Updated: 11/28/24 2001    Narrative:      The following orders were created for panel order CBC & Differential.  Procedure                               Abnormality         Status                     ---------                               -----------         ------                     CBC Auto Differential[[id number]]        Abnormal            Final result                 Please view results for these tests on the individual orders.    CBC Auto Differential [[id number]]  (Abnormal) Collected: 11/28/24 1953    Specimen: Blood Updated: 11/28/24 2001     WBC 8.40 10*3/mm3      RBC 5.08 10*6/mm3      Hemoglobin 15.2 g/dL      Hematocrit 45.0 %      MCV 88.6 fL      MCH 29.9 pg      MCHC 33.8 g/dL      RDW 12.3 %      RDW-SD 40.5 fl      MPV 9.5 fL      Platelets 165 10*3/mm3      Neutrophil % 75.9 %      Lymphocyte % 8.3 %      Monocyte % 9.9 %      Eosinophil % 5.6 %      Basophil % 0.2 %      Immature Grans % 0.1 %      Neutrophils, Absolute 6.37 10*3/mm3      Lymphocytes, Absolute 0.70 10*3/mm3      Monocytes, Absolute 0.83 10*3/mm3      Eosinophils, Absolute 0.47 10*3/mm3      Basophils, Absolute 0.02 10*3/mm3      Immature Grans, Absolute 0.01 10*3/mm3           /81 (BP Location: Left arm, Patient Position: Lying)   Pulse 85   Temp 97.5 °F (36.4 °C) (Oral)   Resp 18   Ht 182 cm (71.65\")   Wt 83.6 kg (184 lb 4.8 oz)   SpO2 95%   BMI 25.24 kg/m²     Discharge Exam:  General Appearance:    Alert, cooperative, no distress                          Head:    Normocephalic, without obvious abnormality, atraumatic                          Eyes:                            Throat:   Lips, tongue, gums normal                          " Neck:   Supple, symmetrical, trachea midline, no JVD                        Lungs:     Clear to auscultation bilaterally, respirations unlabored                Chest Wall:    No tenderness or deformity                        Heart:    Regular rate and rhythm, S1 and S2 normal, no murmur,no  Rub  or gallop                  Abdomen:     Soft, non-tender, bowel sounds active, no masses, no organomegaly                  Extremities:   Extremities normal, atraumatic, no cyanosis or edema                             Skin:   Skin is warm and dry,  no rashes or palpable lesions                  Neurologic:   no focal deficits noted     Disposition:  Home    Activity as tolerated    Diet as tolerated  Diet Order   Procedures    Diet: Cardiac, Diabetic; Healthy Heart (2-3 Na+); Consistent Carbohydrate; Fluid Consistency: Thin (IDDSI 0)       Patient Instructions:      Discharge Medications        New Medications        Instructions Start Date   amLODIPine 5 MG tablet  Commonly known as: NORVASC   5 mg, Oral, Every 24 Hours Scheduled   Start Date: December 4, 2024     fluconazole 200 MG tablet  Commonly known as: DIFLUCAN   800 mg, Oral, Every 24 Hours   Start Date: December 4, 2024     methocarbamol 750 MG tablet  Commonly known as: ROBAXIN   750 mg, Oral, 3 Times Daily PRN      tamsulosin 0.4 MG capsule 24 hr capsule  Commonly known as: FLOMAX   0.4 mg, Oral, Daily   Start Date: December 4, 2024            Continue These Medications        Instructions Start Date   amphetamine-dextroamphetamine XR 30 MG 24 hr capsule  Commonly known as: Adderall XR   30 mg, Oral, Every Morning, ADHD      glucose blood test strip   Test blood sugar once daily      metFORMIN  MG 24 hr tablet  Commonly known as: GLUCOPHAGE-XR   TAKE TWO TABLETS BY MOUTH TWICE DAILY      nabumetone 750 MG tablet  Commonly known as: RELAFEN   TAKE ONE TABLET BY MOUTH TWICE DAILY      oxyCODONE-acetaminophen 7.5-325 MG per tablet  Commonly known as:  PERCOCET   1 tablet, Every 6 Hours PRN      rosuvastatin 20 MG tablet  Commonly known as: CRESTOR   20 mg, Oral, Daily      sildenafil 100 MG tablet  Commonly known as: VIAGRA   100 mg, Oral, As Needed      SITagliptin 100 MG tablet  Commonly known as: JANUVIA   100 mg, Oral, Daily             Stop These Medications      Claritin-D 24 Hour  MG per 24 hr tablet  Generic drug: loratadine-pseudoephedrine     empagliflozin 25 MG tablet tablet  Commonly known as: JARDIANCE            No future appointments.   Follow-up Information       Ta Estevez MD Follow up in 1 week(s).    Specialty: Internal Medicine  Contact information:  [facility] [id number] [phone]                           Discharge Order (From admission, onward)       Start     Ordered    12/03/24 1237  Discharge patient  Once        Expected Discharge Date: 12/03/24   Discharge Disposition: Home or Self Care   Physician of Record for Attribution - Please select from Treatment Team: KRAIG LAO [[id number]]   Review needed by CMO to determine Physician of Record: No      Question Answer Comment   Physician of Record for Attribution - Please select from Treatment Team KRAIG LAO    Review needed by CMO to determine Physician of Record No        12/03/24 1241                    Total time spent discharging patient including evaluation,post hospitalization follow up,  medication and post hospitalization instructions and education total time exceeds 30 minutes.    Signed:  Kraig Lao MD  12/3/2024  12:45 EST     Electronically signed by Kraig Lao MD at 12/03/24 5692

## 2024-12-06 LAB
BACTERIA SPEC AEROBE CULT: NORMAL
BACTERIA SPEC AEROBE CULT: NORMAL

## 2024-12-09 ENCOUNTER — LAB (OUTPATIENT)
Dept: LAB | Facility: HOSPITAL | Age: 59
End: 2024-12-09
Payer: COMMERCIAL

## 2024-12-09 ENCOUNTER — OFFICE VISIT (OUTPATIENT)
Dept: SPORTS MEDICINE | Facility: CLINIC | Age: 59
End: 2024-12-09
Payer: COMMERCIAL

## 2024-12-09 VITALS
HEART RATE: 89 BPM | HEIGHT: 73 IN | OXYGEN SATURATION: 98 % | SYSTOLIC BLOOD PRESSURE: 126 MMHG | BODY MASS INDEX: 23.99 KG/M2 | WEIGHT: 181 LBS | DIASTOLIC BLOOD PRESSURE: 74 MMHG | TEMPERATURE: 98 F | RESPIRATION RATE: 16 BRPM

## 2024-12-09 DIAGNOSIS — N39.0 ACUTE UTI (URINARY TRACT INFECTION): Primary | ICD-10-CM

## 2024-12-09 DIAGNOSIS — E11.65 TYPE 2 DIABETES MELLITUS WITH HYPERGLYCEMIA, WITHOUT LONG-TERM CURRENT USE OF INSULIN: ICD-10-CM

## 2024-12-09 DIAGNOSIS — N10 ACUTE PYELONEPHRITIS: ICD-10-CM

## 2024-12-09 DIAGNOSIS — B49 FUNGEMIA: ICD-10-CM

## 2024-12-09 LAB
ALBUMIN SERPL-MCNC: 3.3 G/DL (ref 3.5–5.2)
ALBUMIN/GLOB SERPL: 0.7 G/DL
ALP SERPL-CCNC: 112 U/L (ref 39–117)
ALT SERPL W P-5'-P-CCNC: 23 U/L (ref 1–41)
ANION GAP SERPL CALCULATED.3IONS-SCNC: 18.3 MMOL/L (ref 5–15)
AST SERPL-CCNC: 24 U/L (ref 1–40)
BILIRUB SERPL-MCNC: 0.5 MG/DL (ref 0–1.2)
BUN SERPL-MCNC: 27 MG/DL (ref 6–20)
BUN/CREAT SERPL: 25.5 (ref 7–25)
CALCIUM SPEC-SCNC: 9.9 MG/DL (ref 8.6–10.5)
CHLORIDE SERPL-SCNC: 95 MMOL/L (ref 98–107)
CO2 SERPL-SCNC: 20.7 MMOL/L (ref 22–29)
CREAT SERPL-MCNC: 1.06 MG/DL (ref 0.76–1.27)
EGFRCR SERPLBLD CKD-EPI 2021: 80.8 ML/MIN/1.73
GLOBULIN UR ELPH-MCNC: 4.7 GM/DL
GLUCOSE SERPL-MCNC: 157 MG/DL (ref 65–99)
POTASSIUM SERPL-SCNC: 4.1 MMOL/L (ref 3.5–5.2)
PROT SERPL-MCNC: 8 G/DL (ref 6–8.5)
SODIUM SERPL-SCNC: 134 MMOL/L (ref 136–145)

## 2024-12-09 PROCEDURE — 80053 COMPREHEN METABOLIC PANEL: CPT | Performed by: FAMILY MEDICINE

## 2024-12-09 PROCEDURE — 36415 COLL VENOUS BLD VENIPUNCTURE: CPT | Performed by: FAMILY MEDICINE

## 2024-12-09 RX ORDER — GLIPIZIDE 5 MG/1
5 TABLET, FILM COATED, EXTENDED RELEASE ORAL
Qty: 30 TABLET | Refills: 1 | Status: SHIPPED | OUTPATIENT
Start: 2024-12-09

## 2024-12-09 RX ORDER — ACYCLOVIR 400 MG/1
1 TABLET ORAL
Qty: 3 EACH | Refills: 2 | Status: SHIPPED | OUTPATIENT
Start: 2024-12-09

## 2024-12-09 NOTE — PROGRESS NOTES
"Maurizio is a 59 y.o. year old male     Chief Complaint   Patient presents with    Hospital Follow Up Visit     Pt would like to discuss established care.        History of Present Illness  Patient is here today to care and follow-up on recent hospitalization.  He has a past history of type 2 diabetes, hyperlipidemia, hypertension, BPH, and frequent kidney stones.  He recently had a complicated kidney stone requiring stent placement with pyelonephritis later found to have fungemia.  He was discharged from the hospital with 2 weeks of fluconazole to treat that after having repeat negative follow-up cultures in the hospital.  Overall he is doing well since his discharge home from the hospital.  He is struggling with rather significant dry mouth presumed from the fluconazole, also generalized fatigue.  He has notably lost at least 20 pounds during his hospitalization.  He was diagnosed with diabetes about 10 years ago.  He admits to inconsistent control.  Over the past several days since his discharge home his diet has been much more aggressively controlled.  Home checks are mostly in the 150s, fasting this morning 117.  He was on Jardiance before but that was held in the hospital because of fluconazole interaction.  Here today with his son who is a physician.    I have reviewed the patient's medical, family, and social history in detail and updated the computerized patient record.    Review of Systems    /74 (BP Location: Right arm, Patient Position: Sitting, Cuff Size: Adult)   Pulse 89   Temp 98 °F (36.7 °C)   Resp 16   Ht 184.5 cm (72.65\")   Wt 82.1 kg (181 lb)   SpO2 98%   BMI 24.11 kg/m²      Physical Exam  Vitals reviewed.   Constitutional:       General: He is not in acute distress.     Appearance: He is not ill-appearing or toxic-appearing.   HENT:      Mouth/Throat:      Mouth: Mucous membranes are dry.   Eyes:      Extraocular Movements: Extraocular movements intact.      Pupils: Pupils are equal, " round, and reactive to light.   Cardiovascular:      Rate and Rhythm: Normal rate and regular rhythm.      Heart sounds: Normal heart sounds.   Pulmonary:      Effort: Pulmonary effort is normal.      Breath sounds: Normal breath sounds.   Neurological:      General: No focal deficit present.      Mental Status: He is alert. Mental status is at baseline.   Psychiatric:         Mood and Affect: Mood normal.         Behavior: Behavior normal.         Thought Content: Thought content normal.         Judgment: Judgment normal.       I reviewed his records extensively including multiple physician notes, consults, labs, cultures, etc.    Procedures     Diagnoses and all orders for this visit:    Acute UTI (urinary tract infection)    Fungemia  -     Cancel: CBC & Differential  -     Comprehensive Metabolic Panel    Type 2 diabetes mellitus with hyperglycemia, without long-term current use of insulin  -     glipizide (GLUCOTROL XL) 5 MG ER tablet; Take 1 tablet by mouth Daily With Breakfast.  -     Continuous Glucose Sensor (Dexcom G7 Sensor) misc; Use 1 Application Every 10 (Ten) Days.    Acute pyelonephritis  -     Cancel: CBC & Differential  -     Comprehensive Metabolic Panel      Thankfully appears to be recovering well regarding his acute pyelonephritis.  Regarding his fungemia, I will reach out to infectious disease regarding any necessary follow-up testing.  He will continue fluconazole as prescribed.  I suspect his dry mouth is a consequence of that currently.    Regarding his diabetes, we discussed the importance of long-term tight control.  Agree with holding Jardiance right now because of interaction with fluconazole.  Ultimately I am curious if the glycosuria from Jardiance is a good idea in his setting of frequent kidney stones and risk of recurrent infections there.  Once he stabilizes longer-term, I think it might be helpful to consider a GLP-1 inhibitor instead of Jardiance to optimize his diabetes  control.  Also recommend utilization of a continuous glucometer for optimizing his glycemic control given his recent notable hyperglycemia while in the hospital and uncontrolled diabetes.    Regarding his hypertension, his blood pressure is well-controlled with amlodipine currently.  After he is stabilized for a longer period of time I think it would be prudent to change that to an ACE inhibitor or ARB for long-term diabetes risk reduction.    Recheck CBC and CMP.    Plan to follow-up in 2 weeks to reassess.    Total time: 65 minutes. This includes time spent with the patient, but also time spent before the visit reviewing the chart and time after the visit documenting the visit, reviewing labs, imaging studies, etc. This is in addition to/separate from any documented procedures performed.      EMR Dragon/Transcription disclaimer:    Much of this encounter note is an electronic transcription/translation of spoken language to printed text.  The electronic translation of spoken language may permit erroneous, or at times, nonsensical words or phrases to be inadvertently transcribed.  Although I have reviewed the note for such errors some may still exist.

## 2024-12-10 ENCOUNTER — READMISSION MANAGEMENT (OUTPATIENT)
Dept: CALL CENTER | Facility: HOSPITAL | Age: 59
End: 2024-12-10
Payer: COMMERCIAL

## 2024-12-10 NOTE — OUTREACH NOTE
Sepsis Week 1 Survey      Flowsheet Row Responses   [facility]   Does the patient have one of the following disease processes/diagnoses(primary or secondary)? Sepsis   Week 1 attempt successful? Yes   Call start time 0822   Call end time 0825   Meds reviewed with patient/caregiver? Yes   Is the patient having any side effects they believe may be caused by any medication additions or changes? No   Does the patient have all medications related to this admission filled (includes all antibiotics, inhalers, nebulizers,steroids,etc.) Yes   Is the patient taking all medications as directed (includes completed medication regime)? Yes   Does the patient have a primary care provider?  Yes   Comments regarding PCP 12/9/24   Does the patient have an appointment with their PCP within 7 days of discharge? Yes   Has the patient kept scheduled appointments due by today? Yes   Psychosocial issues? No   Did the patient receive a copy of their discharge instructions? Yes   Nursing interventions Reviewed instructions with patient   What is the patient's perception of their health status since discharge? Improving   Nursing interventions Nurse provided patient education   Is the patient/caregiver able to teach back TIME? E xtremely Ill - severe pain, discomfort, shortness of breath, M ental Decline - confused, sleepy, difficult to arouse, T emperature - higher or lower than normal, I nfection - may have signs and symptoms of an infection   Nursing interventions Nurse provided reassurance to patient   Is patient/caregiver able to teach back steps to recovery at home? Rest and regain strength, Eat a balanced diet, Learn about sepsis(sepsis.org)   Is the patient/caregiver able to teach back signs and symptoms of worsening condition: Fever, Altered mental status(confusion/coma), Shortness of breath/rapid respiratory rate   If the patient is a current smoker, are they able to teach back resources for  cessation? Not a smoker   Is the patient/caregiver able to teach back the hierarchy of who to call/visit for symptoms/problems? PCP, Specialist, Home health nurse, Urgent Care, ED, 911 Yes   Week 1 call completed? Yes   Graduated Yes   Is the patient interested in additional calls from an ambulatory ? No   Would this patient benefit from a Referral to [facility] Social Work? No   Wrap up additional comments Pt states he is doing better, and denies fever/chills. Reviewed warning s/s of sepsis with pt, and when to call 911. Pt directed to website sepsis.org for more info. Patient had PCP fu appt.   Call end time 0825            Chloe ANTOINE - Registered Nurse

## 2024-12-11 ENCOUNTER — LAB (OUTPATIENT)
Dept: LAB | Facility: HOSPITAL | Age: 59
End: 2024-12-11
Payer: COMMERCIAL

## 2024-12-11 ENCOUNTER — TRANSCRIBE ORDERS (OUTPATIENT)
Dept: ADMINISTRATIVE | Facility: HOSPITAL | Age: 59
End: 2024-12-11
Payer: COMMERCIAL

## 2024-12-11 ENCOUNTER — TELEPHONE (OUTPATIENT)
Dept: SPORTS MEDICINE | Facility: CLINIC | Age: 59
End: 2024-12-11
Payer: COMMERCIAL

## 2024-12-11 DIAGNOSIS — N10 PYELONEPHRITIS, ACUTE: ICD-10-CM

## 2024-12-11 DIAGNOSIS — B49 FUNGEMIA: Primary | ICD-10-CM

## 2024-12-11 DIAGNOSIS — B49 FUNGEMIA: ICD-10-CM

## 2024-12-11 LAB
BASOPHILS # BLD AUTO: 0.06 10*3/MM3 (ref 0–0.2)
BASOPHILS NFR BLD AUTO: 0.5 % (ref 0–1.5)
DEPRECATED RDW RBC AUTO: 38.9 FL (ref 37–54)
EOSINOPHIL # BLD AUTO: 0.12 10*3/MM3 (ref 0–0.4)
EOSINOPHIL NFR BLD AUTO: 1 % (ref 0.3–6.2)
ERYTHROCYTE [DISTWIDTH] IN BLOOD BY AUTOMATED COUNT: 12.5 % (ref 12.3–15.4)
HCT VFR BLD AUTO: 42.2 % (ref 37.5–51)
HGB BLD-MCNC: 14.5 G/DL (ref 13–17.7)
IMM GRANULOCYTES # BLD AUTO: 0.08 10*3/MM3 (ref 0–0.05)
IMM GRANULOCYTES NFR BLD AUTO: 0.7 % (ref 0–0.5)
LYMPHOCYTES # BLD AUTO: 0.71 10*3/MM3 (ref 0.7–3.1)
LYMPHOCYTES NFR BLD AUTO: 6 % (ref 19.6–45.3)
MCH RBC QN AUTO: 29.4 PG (ref 26.6–33)
MCHC RBC AUTO-ENTMCNC: 34.4 G/DL (ref 31.5–35.7)
MCV RBC AUTO: 85.6 FL (ref 79–97)
MONOCYTES # BLD AUTO: 0.87 10*3/MM3 (ref 0.1–0.9)
MONOCYTES NFR BLD AUTO: 7.3 % (ref 5–12)
NEUTROPHILS NFR BLD AUTO: 10.09 10*3/MM3 (ref 1.7–7)
NEUTROPHILS NFR BLD AUTO: 84.5 % (ref 42.7–76)
NRBC BLD AUTO-RTO: 0 /100 WBC (ref 0–0.2)
PLATELET # BLD AUTO: 226 10*3/MM3 (ref 140–450)
PMV BLD AUTO: 9.5 FL (ref 6–12)
RBC # BLD AUTO: 4.93 10*6/MM3 (ref 4.14–5.8)
WBC NRBC COR # BLD AUTO: 11.93 10*3/MM3 (ref 3.4–10.8)

## 2024-12-11 PROCEDURE — 85025 COMPLETE CBC W/AUTO DIFF WBC: CPT

## 2024-12-11 PROCEDURE — 36415 COLL VENOUS BLD VENIPUNCTURE: CPT

## 2024-12-11 NOTE — TELEPHONE ENCOUNTER
"Relay     \"The infectious disease specialist does not feel we need any additional follow-up testing unless his symptoms change(fever/chills last night sweats) \"                "

## 2024-12-11 NOTE — TELEPHONE ENCOUNTER
Patient was in the building, stopped by office to see about call. Relayed message to patient, patient verbalized understanding.

## 2024-12-11 NOTE — TELEPHONE ENCOUNTER
----- Message from Samuel Huerta sent at 12/10/2024  5:25 PM EST -----  Will you please call the patient and let him know that the infectious disease specialist does not feel we need any additional follow-up testing unless his symptoms change.  ----- Message -----  From: Bhaskar Guillermo MD  Sent: 12/10/2024   1:13 PM EST  To: Samuel Huerta MD    As long as he does not develop any recurrent symptoms of infection such as fever/chills last night sweats, then I would not recommend getting additional blood cultures or follow-up.  I be happy to see if there are concerns for recurrent infection.  Thank you for asking, BTK  ----- Message -----  From: Samuel Huerta MD  Sent: 12/10/2024   1:07 PM EST  To: Bhaskar Guillermo MD    You saw this patient in the hospital for his positive blood cultures for Candida.  I cannot find any documentation regarding a plan for follow-up with you.  Would you like to see him in the office at some point, or just repeat blood cultures after he finishes fluconazole?  Thank you!

## 2024-12-13 ENCOUNTER — TELEPHONE (OUTPATIENT)
Dept: SPORTS MEDICINE | Facility: CLINIC | Age: 59
End: 2024-12-13
Payer: COMMERCIAL

## 2024-12-13 NOTE — TELEPHONE ENCOUNTER
Patient called stating that his insurance required PA for Dexcom. Informed him PA will be submitted to his insurance.

## 2024-12-30 ENCOUNTER — OFFICE VISIT (OUTPATIENT)
Dept: SPORTS MEDICINE | Facility: CLINIC | Age: 59
End: 2024-12-30
Payer: COMMERCIAL

## 2024-12-30 VITALS
HEART RATE: 66 BPM | BODY MASS INDEX: 23.99 KG/M2 | SYSTOLIC BLOOD PRESSURE: 110 MMHG | OXYGEN SATURATION: 99 % | HEIGHT: 73 IN | DIASTOLIC BLOOD PRESSURE: 62 MMHG | TEMPERATURE: 97.2 F | WEIGHT: 181 LBS

## 2024-12-30 DIAGNOSIS — E11.65 TYPE 2 DIABETES MELLITUS WITH HYPERGLYCEMIA, WITHOUT LONG-TERM CURRENT USE OF INSULIN: Primary | ICD-10-CM

## 2024-12-30 DIAGNOSIS — I10 PRIMARY HYPERTENSION: ICD-10-CM

## 2024-12-30 DIAGNOSIS — N40.1 BENIGN PROSTATIC HYPERPLASIA WITH URINARY FREQUENCY: ICD-10-CM

## 2024-12-30 DIAGNOSIS — Z12.11 SCREEN FOR COLON CANCER: ICD-10-CM

## 2024-12-30 DIAGNOSIS — E78.2 MIXED HYPERLIPIDEMIA: ICD-10-CM

## 2024-12-30 DIAGNOSIS — N10 ACUTE PYELONEPHRITIS: ICD-10-CM

## 2024-12-30 DIAGNOSIS — F90.0 ATTENTION DEFICIT HYPERACTIVITY DISORDER (ADHD), PREDOMINANTLY INATTENTIVE TYPE: ICD-10-CM

## 2024-12-30 DIAGNOSIS — R35.0 BENIGN PROSTATIC HYPERPLASIA WITH URINARY FREQUENCY: ICD-10-CM

## 2024-12-30 DIAGNOSIS — B49 FUNGEMIA: ICD-10-CM

## 2024-12-30 PROCEDURE — 99214 OFFICE O/P EST MOD 30 MIN: CPT | Performed by: FAMILY MEDICINE

## 2024-12-30 RX ORDER — METFORMIN HYDROCHLORIDE 500 MG/1
1000 TABLET, EXTENDED RELEASE ORAL 2 TIMES DAILY
Qty: 360 TABLET | Refills: 1 | Status: SHIPPED | OUTPATIENT
Start: 2024-12-30

## 2024-12-30 RX ORDER — TAMSULOSIN HYDROCHLORIDE 0.4 MG/1
0.4 CAPSULE ORAL DAILY
Qty: 90 CAPSULE | Refills: 3 | Status: SHIPPED | OUTPATIENT
Start: 2024-12-30

## 2024-12-30 RX ORDER — GLIPIZIDE 5 MG/1
5 TABLET, FILM COATED, EXTENDED RELEASE ORAL
Qty: 30 TABLET | Refills: 1 | Status: SHIPPED | OUTPATIENT
Start: 2024-12-30

## 2024-12-30 RX ORDER — ROSUVASTATIN CALCIUM 20 MG/1
20 TABLET, COATED ORAL DAILY
Qty: 90 TABLET | Refills: 3 | Status: SHIPPED | OUTPATIENT
Start: 2024-12-30

## 2024-12-30 RX ORDER — LISINOPRIL 10 MG/1
10 TABLET ORAL DAILY
Qty: 90 TABLET | Refills: 3 | Status: SHIPPED | OUTPATIENT
Start: 2024-12-30

## 2024-12-30 NOTE — PROGRESS NOTES
"Maurizio is a 59 y.o. year old male    Chief Complaint   Patient presents with    Follow-up     Patient is here to follow up on multiple issues.        History of Present Illness   HPI   Here to follow-up on multiple medical conditions.  -Regarding his fungemia, he completed fluconazole and is feeling well.  I confirmed with infectious disease that follow-up testing is unnecessary.  He is overall feeling more energy, better cognition, better speech particularly without the dry mouth of fluconazole.  - Diabetes has been under good control.  His diet has been under good control at home and he feels this is very sustainable.  Dexcom for the past 2 weeks shows average blood sugar in the 110s, average baseline around 100, typical high around 150.  Tolerating metformin, Januvia, glipizide without side effects or low episodes.  - Blood pressure well-controlled with amlodipine without side effects.  - Urinary symptoms are doing well with tamsulosin.  He states his chronic BPH symptoms are responding well to this also.  No new stone problems.  - Chronic ADHD is stable with Adderall.  He had a refill recently from his previous primary care provider.  He has been treated with this for years without any difficulty and with good results.    Review of Systems    /62 (BP Location: Right arm, Patient Position: Sitting, Cuff Size: Adult)   Pulse 66   Temp 97.2 °F (36.2 °C) (Temporal)   Ht 184.5 cm (72.65\")   Wt 82.1 kg (181 lb)   SpO2 99%   BMI 24.11 kg/m²          Physical Exam  Vitals and nursing note reviewed.   Constitutional:       General: He is not in acute distress.     Appearance: He is well-developed.   HENT:      Head: Normocephalic and atraumatic.   Eyes:      Conjunctiva/sclera: Conjunctivae normal.      Pupils: Pupils are equal, round, and reactive to light.   Cardiovascular:      Rate and Rhythm: Normal rate and regular rhythm.      Heart sounds: Normal heart sounds.   Pulmonary:      Effort: Pulmonary effort " is normal.      Breath sounds: Normal breath sounds.   Musculoskeletal:      Cervical back: Normal range of motion.   Skin:     General: Skin is warm and dry.   Neurological:      General: No focal deficit present.      Mental Status: He is alert and oriented to person, place, and time.   Psychiatric:         Mood and Affect: Mood normal.         Behavior: Behavior normal.         Thought Content: Thought content normal.         Judgment: Judgment normal.           Current Outpatient Medications:     amphetamine-dextroamphetamine XR (Adderall XR) 30 MG 24 hr capsule, Take 1 capsule by mouth Every Morning ADHD (Patient taking differently: Take 20 mg by mouth Every Morning ADHD), Disp: 30 capsule, Rfl: 0    Continuous Glucose Sensor (Dexcom G7 Sensor) misc, Use 1 Application Every 10 (Ten) Days., Disp: 3 each, Rfl: 2    glipizide (GLUCOTROL XL) 5 MG ER tablet, Take 1 tablet by mouth Daily With Breakfast., Disp: 30 tablet, Rfl: 1    glucose blood test strip, Test blood sugar once daily, Disp: 100 each, Rfl: 12    metFORMIN ER (GLUCOPHAGE-XR) 500 MG 24 hr tablet, Take 2 tablets by mouth 2 (Two) Times a Day., Disp: 360 tablet, Rfl: 1    nabumetone (RELAFEN) 750 MG tablet, TAKE ONE TABLET BY MOUTH TWICE DAILY, Disp: 180 tablet, Rfl: 1    rosuvastatin (CRESTOR) 20 MG tablet, Take 1 tablet by mouth Daily., Disp: 90 tablet, Rfl: 3    SITagliptin (JANUVIA) 100 MG tablet, Take 1 tablet by mouth Daily., Disp: 30 tablet, Rfl: 3    tamsulosin (FLOMAX) 0.4 MG capsule 24 hr capsule, Take 1 capsule by mouth Daily., Disp: 90 capsule, Rfl: 3    lisinopril (PRINIVIL,ZESTRIL) 10 MG tablet, Take 1 tablet by mouth Daily., Disp: 90 tablet, Rfl: 3    sildenafil (VIAGRA) 100 MG tablet, Take 1 tablet by mouth As Needed for Erectile Dysfunction., Disp: 18 tablet, Rfl: 13     Diagnoses and all orders for this visit:    Type 2 diabetes mellitus with hyperglycemia, without long-term current use of insulin  -     glipizide (GLUCOTROL XL) 5 MG ER  tablet; Take 1 tablet by mouth Daily With Breakfast.    Fungemia    Acute pyelonephritis    Primary hypertension    Benign prostatic hyperplasia with urinary frequency    Mixed hyperlipidemia    Attention deficit hyperactivity disorder (ADHD), predominantly inattentive type    Screen for colon cancer  -     Ambulatory Referral For Screening Colonoscopy    Other orders  -     tamsulosin (FLOMAX) 0.4 MG capsule 24 hr capsule; Take 1 capsule by mouth Daily.  -     lisinopril (PRINIVIL,ZESTRIL) 10 MG tablet; Take 1 tablet by mouth Daily.  -     metFORMIN ER (GLUCOPHAGE-XR) 500 MG 24 hr tablet; Take 2 tablets by mouth 2 (Two) Times a Day.  -     SITagliptin (JANUVIA) 100 MG tablet; Take 1 tablet by mouth Daily.  -     rosuvastatin (CRESTOR) 20 MG tablet; Take 1 tablet by mouth Daily.       He overall appears better in general as he is recovering from his acute illness.  He is still not back to full energy and function, hopefully about 2 more weeks out of work will get him ready to return to work.  He will look into the options for part-time return.  Continue current diabetes management plan.  Will follow-up in about 6 weeks and consider transitioning to GLP for longer-term stability and control  Regarding hypertension I think it is a good time for us to go ahead and change from amlodipine to lisinopril for long-term benefits.  Regarding his ADHD I agreed to assume prescribing of his Adderall.  Discussed appropriate use, etc.  He agrees to this.  PDMP reviewed and agreement completed.  Coincidentally also due for routine colonoscopy    EMR Dragon/Transcription disclaimer:    Much of this encounter note is an electronic transcription/translation of spoken language to printed text.  The electronic translation of spoken language may permit erroneous, or at times, nonsensical words or phrases to be inadvertently transcribed.  Although I have reviewed the note for such errors some may still exist.

## 2025-02-10 ENCOUNTER — LAB (OUTPATIENT)
Dept: LAB | Facility: HOSPITAL | Age: 60
End: 2025-02-10
Payer: COMMERCIAL

## 2025-02-10 ENCOUNTER — OFFICE VISIT (OUTPATIENT)
Dept: SPORTS MEDICINE | Facility: CLINIC | Age: 60
End: 2025-02-10
Payer: COMMERCIAL

## 2025-02-10 VITALS
HEART RATE: 87 BPM | HEIGHT: 73 IN | BODY MASS INDEX: 23.99 KG/M2 | OXYGEN SATURATION: 98 % | SYSTOLIC BLOOD PRESSURE: 132 MMHG | TEMPERATURE: 97.1 F | DIASTOLIC BLOOD PRESSURE: 80 MMHG | WEIGHT: 181 LBS

## 2025-02-10 DIAGNOSIS — I10 PRIMARY HYPERTENSION: ICD-10-CM

## 2025-02-10 DIAGNOSIS — E11.65 TYPE 2 DIABETES MELLITUS WITH HYPERGLYCEMIA, WITHOUT LONG-TERM CURRENT USE OF INSULIN: Primary | ICD-10-CM

## 2025-02-10 LAB
ALBUMIN SERPL-MCNC: 4 G/DL (ref 3.5–5.2)
ALBUMIN/GLOB SERPL: 1.2 G/DL
ALP SERPL-CCNC: 89 U/L (ref 39–117)
ALT SERPL W P-5'-P-CCNC: 19 U/L (ref 1–41)
ANION GAP SERPL CALCULATED.3IONS-SCNC: 8.3 MMOL/L (ref 5–15)
AST SERPL-CCNC: 18 U/L (ref 1–40)
BILIRUB SERPL-MCNC: 0.5 MG/DL (ref 0–1.2)
BUN SERPL-MCNC: 23 MG/DL (ref 6–20)
BUN/CREAT SERPL: 25.8 (ref 7–25)
CALCIUM SPEC-SCNC: 9.7 MG/DL (ref 8.6–10.5)
CHLORIDE SERPL-SCNC: 105 MMOL/L (ref 98–107)
CO2 SERPL-SCNC: 28.7 MMOL/L (ref 22–29)
CREAT SERPL-MCNC: 0.89 MG/DL (ref 0.76–1.27)
EGFRCR SERPLBLD CKD-EPI 2021: 98.7 ML/MIN/1.73
GLOBULIN UR ELPH-MCNC: 3.3 GM/DL
GLUCOSE SERPL-MCNC: 140 MG/DL (ref 65–99)
HBA1C MFR BLD: 6.3 % (ref 4.8–5.6)
POTASSIUM SERPL-SCNC: 4.1 MMOL/L (ref 3.5–5.2)
PROT SERPL-MCNC: 7.3 G/DL (ref 6–8.5)
SODIUM SERPL-SCNC: 142 MMOL/L (ref 136–145)

## 2025-02-10 PROCEDURE — 99214 OFFICE O/P EST MOD 30 MIN: CPT | Performed by: FAMILY MEDICINE

## 2025-02-10 PROCEDURE — 36415 COLL VENOUS BLD VENIPUNCTURE: CPT | Performed by: FAMILY MEDICINE

## 2025-02-10 PROCEDURE — 80053 COMPREHEN METABOLIC PANEL: CPT | Performed by: FAMILY MEDICINE

## 2025-02-10 PROCEDURE — 83036 HEMOGLOBIN GLYCOSYLATED A1C: CPT | Performed by: FAMILY MEDICINE

## 2025-02-10 NOTE — PROGRESS NOTES
"Maurizio is a 60 y.o. year old male     Chief Complaint   Patient presents with    Follow-up     Patient is here to follow up on multiple issues.        History of Present Illness  History of Present Illness  The patient is here for follow-up on diabetes and hypertension.    Since his last visit, he has been feeling well. Home blood sugar checks are controlled between 100 and 125. He continues a healthy diet and exercises on a Peloton bike every other day.    Home blood pressure readings are well controlled.    I have reviewed the patient's medical, family, and social history in detail and updated the computerized patient record.    Review of Systems    /80 (BP Location: Right arm, Patient Position: Sitting, Cuff Size: Adult)   Pulse 87   Temp 97.1 °F (36.2 °C) (Temporal)   Ht 184.5 cm (72.65\")   Wt 82.1 kg (181 lb)   SpO2 98%   BMI 24.11 kg/m²      Physical Exam    Vital signs reviewed.   General: No acute distress.      Physical Exam  Normal appearance. Lungs clear to auscultation bilaterally. Heart regular rate and rhythm.    Results  Results      Procedures     Diagnoses and all orders for this visit:    Type 2 diabetes mellitus with hyperglycemia, without long-term current use of insulin  -     Hemoglobin A1c  -     Comprehensive Metabolic Panel    Primary hypertension  -     Hemoglobin A1c  -     Comprehensive Metabolic Panel      Assessment & Plan  1. Diabetes mellitus. Home blood sugar levels consistently between 100 and 125. On glipizide. Encouraged to continue healthy lifestyle measures. Lab tests today to evaluate condition. Possible transition to GLP-1 therapy based on results.    2. Hypertension. Home blood pressure readings well controlled.    Patient or patient representative verbalized consent for the use of Ambient Listening during the visit with  Samuel Huerta MD for chart documentation. 2/24/2025  08:13 EST    *Dictated after leaving exam room.     Samuel Huerta MD   08:13 EST "   02/24/25

## 2025-02-12 NOTE — PROGRESS NOTES
Patient notified of results via MyChart. No further action necessary at this time.     SVITLANA Ortiz

## 2025-03-17 DIAGNOSIS — E11.65 TYPE 2 DIABETES MELLITUS WITH HYPERGLYCEMIA, WITHOUT LONG-TERM CURRENT USE OF INSULIN: ICD-10-CM

## 2025-03-17 RX ORDER — GLIPIZIDE 5 MG/1
5 TABLET, FILM COATED, EXTENDED RELEASE ORAL
Qty: 30 TABLET | Refills: 1 | Status: SHIPPED | OUTPATIENT
Start: 2025-03-17

## 2025-03-31 DIAGNOSIS — F90.0 ATTENTION DEFICIT HYPERACTIVITY DISORDER (ADHD), PREDOMINANTLY INATTENTIVE TYPE: Primary | ICD-10-CM

## 2025-03-31 RX ORDER — DEXTROAMPHETAMINE SACCHARATE, AMPHETAMINE ASPARTATE MONOHYDRATE, DEXTROAMPHETAMINE SULFATE AND AMPHETAMINE SULFATE 5; 5; 5; 5 MG/1; MG/1; MG/1; MG/1
20 CAPSULE, EXTENDED RELEASE ORAL DAILY
Qty: 30 CAPSULE | Refills: 0 | Status: SHIPPED | OUTPATIENT
Start: 2025-03-31

## 2025-04-14 PROBLEM — N20.0 RENAL STONE: Status: ACTIVE | Noted: 2022-08-23

## 2025-04-14 PROBLEM — G47.33 OBSTRUCTIVE SLEEP APNEA SYNDROME: Status: ACTIVE | Noted: 2022-05-31

## 2025-04-14 PROBLEM — E34.9 DISORDER OF ENDOCRINE SYSTEM: Status: ACTIVE | Noted: 2022-08-23

## 2025-04-14 PROBLEM — Z99.89 CPAP (CONTINUOUS POSITIVE AIRWAY PRESSURE) DEPENDENCE: Status: ACTIVE | Noted: 2025-04-14

## 2025-04-14 PROBLEM — E78.5 HYPERLIPIDEMIA: Status: ACTIVE | Noted: 2022-05-31

## 2025-04-14 PROBLEM — F90.0 ATTENTION DEFICIT HYPERACTIVITY DISORDER (ADHD), PREDOMINANTLY INATTENTIVE TYPE: Status: ACTIVE | Noted: 2022-08-23

## 2025-04-14 PROBLEM — E11.9 TYPE 2 DIABETES MELLITUS WITHOUT COMPLICATION: Status: ACTIVE | Noted: 2020-06-09

## 2025-04-14 PROBLEM — R97.20 RAISED PROSTATE SPECIFIC ANTIGEN: Status: ACTIVE | Noted: 2022-08-23

## 2025-04-15 ENCOUNTER — LAB (OUTPATIENT)
Dept: LAB | Facility: HOSPITAL | Age: 60
End: 2025-04-15
Payer: COMMERCIAL

## 2025-04-15 ENCOUNTER — OFFICE VISIT (OUTPATIENT)
Dept: SPORTS MEDICINE | Facility: CLINIC | Age: 60
End: 2025-04-15
Payer: COMMERCIAL

## 2025-04-15 VITALS
WEIGHT: 181 LBS | HEART RATE: 88 BPM | TEMPERATURE: 97.7 F | BODY MASS INDEX: 23.99 KG/M2 | DIASTOLIC BLOOD PRESSURE: 76 MMHG | HEIGHT: 73 IN | OXYGEN SATURATION: 99 % | RESPIRATION RATE: 16 BRPM | SYSTOLIC BLOOD PRESSURE: 136 MMHG

## 2025-04-15 DIAGNOSIS — R40.4 TRANSIENT ALTERATION OF AWARENESS: Primary | ICD-10-CM

## 2025-04-15 DIAGNOSIS — E11.9 TYPE 2 DIABETES MELLITUS WITHOUT COMPLICATION, WITHOUT LONG-TERM CURRENT USE OF INSULIN: ICD-10-CM

## 2025-04-15 PROBLEM — N39.0 ACUTE UTI (URINARY TRACT INFECTION): Status: RESOLVED | Noted: 2024-11-29 | Resolved: 2025-04-15

## 2025-04-15 PROBLEM — E11.65 TYPE 2 DIABETES MELLITUS WITH HYPERGLYCEMIA, WITHOUT LONG-TERM CURRENT USE OF INSULIN: Status: ACTIVE | Noted: 2020-06-09

## 2025-04-15 PROBLEM — E34.9 DISORDER OF ENDOCRINE SYSTEM: Status: RESOLVED | Noted: 2022-08-23 | Resolved: 2025-04-15

## 2025-04-15 PROBLEM — A41.9 SEPSIS: Status: RESOLVED | Noted: 2024-11-28 | Resolved: 2025-04-15

## 2025-04-15 PROBLEM — N10 ACUTE PYELONEPHRITIS: Status: RESOLVED | Noted: 2024-12-03 | Resolved: 2025-04-15

## 2025-04-15 LAB
ALBUMIN SERPL-MCNC: 4.4 G/DL (ref 3.5–5.2)
ALBUMIN/GLOB SERPL: 1.5 G/DL
ALP SERPL-CCNC: 76 U/L (ref 39–117)
ALT SERPL W P-5'-P-CCNC: 17 U/L (ref 1–41)
ANION GAP SERPL CALCULATED.3IONS-SCNC: 10.3 MMOL/L (ref 5–15)
AST SERPL-CCNC: 20 U/L (ref 1–40)
BACTERIA UR QL AUTO: NORMAL /HPF
BASOPHILS # BLD AUTO: 0.06 10*3/MM3 (ref 0–0.2)
BASOPHILS NFR BLD AUTO: 0.7 % (ref 0–1.5)
BILIRUB SERPL-MCNC: 0.5 MG/DL (ref 0–1.2)
BILIRUB UR QL STRIP: NEGATIVE
BUN SERPL-MCNC: 21 MG/DL (ref 8–23)
BUN/CREAT SERPL: 20.2 (ref 7–25)
CALCIUM SPEC-SCNC: 9.7 MG/DL (ref 8.6–10.5)
CHLORIDE SERPL-SCNC: 102 MMOL/L (ref 98–107)
CLARITY UR: CLEAR
CO2 SERPL-SCNC: 25.7 MMOL/L (ref 22–29)
COLOR UR: YELLOW
CREAT SERPL-MCNC: 1.04 MG/DL (ref 0.76–1.27)
DEPRECATED RDW RBC AUTO: 41.2 FL (ref 37–54)
EGFRCR SERPLBLD CKD-EPI 2021: 82.2 ML/MIN/1.73
EOSINOPHIL # BLD AUTO: 1.08 10*3/MM3 (ref 0–0.4)
EOSINOPHIL NFR BLD AUTO: 12.4 % (ref 0.3–6.2)
ERYTHROCYTE [DISTWIDTH] IN BLOOD BY AUTOMATED COUNT: 12.5 % (ref 12.3–15.4)
GLOBULIN UR ELPH-MCNC: 3 GM/DL
GLUCOSE SERPL-MCNC: 154 MG/DL (ref 65–99)
GLUCOSE UR STRIP-MCNC: NEGATIVE MG/DL
HBA1C MFR BLD: 5.7 % (ref 4.8–5.6)
HCT VFR BLD AUTO: 42.6 % (ref 37.5–51)
HGB BLD-MCNC: 14.5 G/DL (ref 13–17.7)
HGB UR QL STRIP.AUTO: NEGATIVE
HYALINE CASTS UR QL AUTO: NORMAL /LPF
IMM GRANULOCYTES # BLD AUTO: 0.03 10*3/MM3 (ref 0–0.05)
IMM GRANULOCYTES NFR BLD AUTO: 0.3 % (ref 0–0.5)
KETONES UR QL STRIP: NEGATIVE
LEUKOCYTE ESTERASE UR QL STRIP.AUTO: NEGATIVE
LYMPHOCYTES # BLD AUTO: 1.2 10*3/MM3 (ref 0.7–3.1)
LYMPHOCYTES NFR BLD AUTO: 13.8 % (ref 19.6–45.3)
MAGNESIUM SERPL-MCNC: 2 MG/DL (ref 1.6–2.4)
MCH RBC QN AUTO: 30.5 PG (ref 26.6–33)
MCHC RBC AUTO-ENTMCNC: 34 G/DL (ref 31.5–35.7)
MCV RBC AUTO: 89.7 FL (ref 79–97)
MONOCYTES # BLD AUTO: 0.7 10*3/MM3 (ref 0.1–0.9)
MONOCYTES NFR BLD AUTO: 8 % (ref 5–12)
NEUTROPHILS NFR BLD AUTO: 5.65 10*3/MM3 (ref 1.7–7)
NEUTROPHILS NFR BLD AUTO: 64.8 % (ref 42.7–76)
NITRITE UR QL STRIP: NEGATIVE
NRBC BLD AUTO-RTO: 0 /100 WBC (ref 0–0.2)
PH UR STRIP.AUTO: 6 [PH] (ref 5–8)
PLATELET # BLD AUTO: 164 10*3/MM3 (ref 140–450)
PMV BLD AUTO: 9.4 FL (ref 6–12)
POTASSIUM SERPL-SCNC: 4.9 MMOL/L (ref 3.5–5.2)
PROT SERPL-MCNC: 7.4 G/DL (ref 6–8.5)
PROT UR QL STRIP: ABNORMAL
RBC # BLD AUTO: 4.75 10*6/MM3 (ref 4.14–5.8)
RBC # UR STRIP: NORMAL /HPF
REF LAB TEST METHOD: NORMAL
SODIUM SERPL-SCNC: 138 MMOL/L (ref 136–145)
SP GR UR STRIP: 1.01 (ref 1–1.03)
SQUAMOUS #/AREA URNS HPF: NORMAL /HPF
T4 FREE SERPL-MCNC: 1.41 NG/DL (ref 0.92–1.68)
TSH SERPL DL<=0.05 MIU/L-ACNC: 1.94 UIU/ML (ref 0.27–4.2)
UROBILINOGEN UR QL STRIP: ABNORMAL
VIT B12 BLD-MCNC: 750 PG/ML (ref 211–946)
WBC # UR STRIP: NORMAL /HPF
WBC NRBC COR # BLD AUTO: 8.72 10*3/MM3 (ref 3.4–10.8)

## 2025-04-15 PROCEDURE — 80050 GENERAL HEALTH PANEL: CPT | Performed by: FAMILY MEDICINE

## 2025-04-15 PROCEDURE — 83735 ASSAY OF MAGNESIUM: CPT | Performed by: FAMILY MEDICINE

## 2025-04-15 PROCEDURE — 87086 URINE CULTURE/COLONY COUNT: CPT | Performed by: FAMILY MEDICINE

## 2025-04-15 PROCEDURE — 36415 COLL VENOUS BLD VENIPUNCTURE: CPT | Performed by: FAMILY MEDICINE

## 2025-04-15 PROCEDURE — 82607 VITAMIN B-12: CPT | Performed by: FAMILY MEDICINE

## 2025-04-15 PROCEDURE — 84439 ASSAY OF FREE THYROXINE: CPT | Performed by: FAMILY MEDICINE

## 2025-04-15 PROCEDURE — 83036 HEMOGLOBIN GLYCOSYLATED A1C: CPT | Performed by: FAMILY MEDICINE

## 2025-04-15 PROCEDURE — 81001 URINALYSIS AUTO W/SCOPE: CPT | Performed by: FAMILY MEDICINE

## 2025-04-15 NOTE — PROGRESS NOTES
"Maurizio is a 60 y.o. year old male    Chief Complaint   Patient presents with    Urinary Tract Infection     UTI and confusion starting Friday, UC on Friday.        History of Present Illness      Here today for follow-up recent altered mental status.  On Friday he was unfortunately caught in a telephone scam which led to him wiring money because of fraudulent jury duty claims.  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  His family convinced him to go to urgent care for possible recurrent UTI, but his urinalysis was only notable for 1+ white blood cell count and his culture was negative.  He states that even prior to starting antibiotics he started clearing some the symptoms, states he remembers everything that he did but remembers feeling fully convinced that everything was the gentleman even though in hindsight he looked at it and knows it was obviously a scam.  He is concerned about his lapse in judgment and awareness of things that would typically be second nature.  No other recent illnesses or complaints.  Diabetes has remained stable and under good control.  His blood pressure was slightly elevated in the urgent care but he states he was rather stressed at that time.  He questions some vague loss of memory but nothing significant.  Feels like he is back to normal today.    Review of Systems    /76 (BP Location: Right arm, Patient Position: Sitting, Cuff Size: Adult)   Pulse 88   Temp 97.7 °F (36.5 °C)   Resp 16   Ht 184.5 cm (72.65\")   Wt 82.1 kg (181 lb)   SpO2 99%   BMI 24.11 kg/m²          Physical Exam  Vitals and nursing note reviewed.   Constitutional:       General: He is not in acute distress.     Appearance: He is well-developed.   HENT:      Head: Normocephalic and atraumatic.   Eyes:      Conjunctiva/sclera: Conjunctivae normal.      Pupils: Pupils are equal, round, and reactive to light.   Cardiovascular:      Rate " and Rhythm: Normal rate and regular rhythm.      Heart sounds: Normal heart sounds.   Pulmonary:      Effort: Pulmonary effort is normal.      Breath sounds: Normal breath sounds.   Musculoskeletal:      Cervical back: Normal range of motion.   Skin:     General: Skin is warm and dry.   Neurological:      General: No focal deficit present.      Mental Status: He is alert and oriented to person, place, and time.   Psychiatric:         Mood and Affect: Mood normal.         Behavior: Behavior normal.         Thought Content: Thought content normal.         Judgment: Judgment normal.           Current Outpatient Medications:     amphetamine-dextroamphetamine XR (ADDERALL XR) 20 MG 24 hr capsule, TAKE ONE CAPSULE BY MOUTH DAILY, Disp: 30 capsule, Rfl: 0    glipizide (GLUCOTROL XL) 5 MG ER tablet, TAKE ONE TABLET BY MOUTH DAILY WITH BREAKFAST, Disp: 30 tablet, Rfl: 1    glucose blood test strip, Test blood sugar once daily, Disp: 100 each, Rfl: 12    lisinopril (PRINIVIL,ZESTRIL) 10 MG tablet, Take 1 tablet by mouth Daily., Disp: 90 tablet, Rfl: 3    metFORMIN ER (GLUCOPHAGE-XR) 500 MG 24 hr tablet, Take 2 tablets by mouth 2 (Two) Times a Day., Disp: , Rfl:     nabumetone (RELAFEN) 750 MG tablet, Take 1 tablet by mouth 2 (Two) Times a Day As Needed for Moderate Pain., Disp: 180 tablet, Rfl: 0    nitrofurantoin, macrocrystal-monohydrate, (MACROBID) 100 MG capsule, Take 1 capsule by mouth Every 12 (Twelve) Hours., Disp: 14 capsule, Rfl: 0    rosuvastatin (CRESTOR) 20 MG tablet, Take 1 tablet by mouth Daily., Disp: 90 tablet, Rfl: 3    sildenafil (VIAGRA) 100 MG tablet, Take 1 tablet by mouth As Needed for Erectile Dysfunction., Disp: 18 tablet, Rfl: 13    SITagliptin (JANUVIA) 100 MG tablet, Take 1 tablet by mouth Daily., Disp: 30 tablet, Rfl: 3    tamsulosin (FLOMAX) 0.4 MG capsule 24 hr capsule, Take 1 capsule by mouth Daily., Disp: 90 capsule, Rfl: 3     Diagnoses and all orders for this visit:    Transient alteration of  awareness  -     Urinalysis With Microscopic - Urine, Clean Catch  -     Urine Culture - Urine, Urine, Clean Catch  -     CBC & Differential  -     Comprehensive Metabolic Panel  -     Hemoglobin A1c  -     Ambulatory Referral to Neuropsychology  -     TSH  -     T4, Free  -     Vitamin B12  -     Magnesium  -     MRI brain w wo contrast; Future  -     MRI angiogram head wo contrast; Future    Type 2 diabetes mellitus without complication, without long-term current use of insulin  -     Urinalysis With Microscopic - Urine, Clean Catch  -     Urine Culture - Urine, Urine, Clean Catch  -     CBC & Differential  -     Comprehensive Metabolic Panel  -     Hemoglobin A1c  -     Ambulatory Referral to Neuropsychology  -     TSH  -     T4, Free  -     Vitamin B12  -     Magnesium  -     MRI brain w wo contrast; Future  -     MRI angiogram head wo contrast; Future       Rather broad differential here.  I am not convinced that this represents delirium caused by urinary tract infection based on his minimal urinalysis findings and negative culture, additionally based on the fact that he feels like he was starting to clear before that test was even resulted.  I think we need to look at other metabolic causes as well as possible vascular causes with MRI and angiogram of the brain because of his cardiovascular risk factors.  Will also plan neuropsychiatric consultation.      Please note that portions of this note were completed with a voice recognition program.

## 2025-04-15 NOTE — PROGRESS NOTES
"Maurizio is a 60 y.o. year old male     Chief Complaint   Patient presents with   • Urinary Tract Infection     UTI and confusion starting Friday, UC on Friday.        History of Present Illness  History of Present Illness      I have reviewed the patient's medical, family, and social history in detail and updated the computerized patient record.    Review of Systems    /76 (BP Location: Right arm, Patient Position: Sitting, Cuff Size: Adult)   Pulse 88   Temp 97.7 °F (36.5 °C)   Resp 16   Ht 184.5 cm (72.65\")   Wt 82.1 kg (181 lb)   SpO2 99%   BMI 24.11 kg/m²      Physical Exam    Vital signs reviewed.   General: No acute distress.      Physical Exam      Results  Results      Procedures     There are no diagnoses linked to this encounter.  Assessment & Plan      {[facility] [employer] Provider Statement:26642}    *Dictated after leaving exam room.     Samuel Huerta MD   12:06 EDT   04/15/25   "

## 2025-04-16 LAB — BACTERIA SPEC AEROBE CULT: NO GROWTH

## 2025-04-24 ENCOUNTER — LAB (OUTPATIENT)
Dept: LAB | Facility: HOSPITAL | Age: 60
End: 2025-04-24
Payer: COMMERCIAL

## 2025-04-24 DIAGNOSIS — B49 FUNGEMIA: ICD-10-CM

## 2025-04-24 DIAGNOSIS — D72.10 EOSINOPHILIA, UNSPECIFIED TYPE: ICD-10-CM

## 2025-04-24 DIAGNOSIS — R40.4 TRANSIENT ALTERATION OF AWARENESS: ICD-10-CM

## 2025-04-24 PROCEDURE — 36415 COLL VENOUS BLD VENIPUNCTURE: CPT

## 2025-04-24 PROCEDURE — 87040 BLOOD CULTURE FOR BACTERIA: CPT

## 2025-04-29 LAB — BACTERIA SPEC AEROBE CULT: NORMAL

## 2025-05-12 ENCOUNTER — OFFICE VISIT (OUTPATIENT)
Dept: SPORTS MEDICINE | Facility: CLINIC | Age: 60
End: 2025-05-12
Payer: COMMERCIAL

## 2025-05-12 ENCOUNTER — LAB (OUTPATIENT)
Dept: LAB | Facility: HOSPITAL | Age: 60
End: 2025-05-12
Payer: COMMERCIAL

## 2025-05-12 VITALS
DIASTOLIC BLOOD PRESSURE: 88 MMHG | BODY MASS INDEX: 23.99 KG/M2 | SYSTOLIC BLOOD PRESSURE: 118 MMHG | TEMPERATURE: 97.1 F | HEIGHT: 73 IN | WEIGHT: 181 LBS | HEART RATE: 67 BPM | OXYGEN SATURATION: 97 %

## 2025-05-12 DIAGNOSIS — R40.4 TRANSIENT ALTERATION OF AWARENESS: ICD-10-CM

## 2025-05-12 DIAGNOSIS — D72.10 EOSINOPHILIA, UNSPECIFIED TYPE: ICD-10-CM

## 2025-05-12 DIAGNOSIS — E11.9 TYPE 2 DIABETES MELLITUS WITHOUT COMPLICATION, WITHOUT LONG-TERM CURRENT USE OF INSULIN: Primary | ICD-10-CM

## 2025-05-12 PROCEDURE — 36415 COLL VENOUS BLD VENIPUNCTURE: CPT | Performed by: FAMILY MEDICINE

## 2025-05-12 PROCEDURE — 99213 OFFICE O/P EST LOW 20 MIN: CPT | Performed by: FAMILY MEDICINE

## 2025-05-12 NOTE — PROGRESS NOTES
"Maurizio is a 60 y.o. year old male     Chief Complaint   Patient presents with    Diabetes     Patient is here to follow up on his diabetes.        History of Present Illness  History of Present Illness  The patient is here for diabetes follow-up. Since his last visit, he has maintained good control. Labs from a few weeks ago showed A1c at 5.7. He follows a steady diet and walks a mile daily with his dog. No side effects from current medications. No episodes of altered mental status since the last visit. Follow-up for neuropsychiatric testing results later today. MRIs not yet done.    I have reviewed the patient's medical, family, and social history in detail and updated the computerized patient record.    Review of Systems    /88 (BP Location: Right arm, Patient Position: Sitting, Cuff Size: Adult)   Pulse 67   Temp 97.1 °F (36.2 °C) (Temporal)   Ht 184.5 cm (72.65\")   Wt 82.1 kg (181 lb)   SpO2 97%   BMI 24.11 kg/m²      Physical Exam    Vital signs reviewed.   General: No acute distress.      Physical Exam  Lungs clear. Heart regular rate and rhythm. Mood and affect normal.    Results  Results  Lab results: A1c 5.7.    Procedures     Diagnoses and all orders for this visit:    Type 2 diabetes mellitus without complication, without long-term current use of insulin  -     Semaglutide,0.25 or 0.5MG/DOS, (OZEMPIC) 2 MG/1.5ML solution pen-injector; Inject 0.25 mg under the skin into the appropriate area as directed 1 (One) Time Per Week.    Eosinophilia, unspecified type  -     CBC & Differential  -     Fungus Culture, Blood - Blood, Arm, Left    Transient alteration of awareness      Assessment & Plan  Diabetes: Excellent control. Consider transitioning to GLP-1 receptor agonist for cardiovascular risk reduction, if well-tolerated and covered by insurance. Discontinue glipizide if initial dose tolerated to avoid hypoglycemia. Gradually phase out Januvia. Aim for effective glycemic control with semaglutide " monotherapy for cardiovascular and renal protection.    Altered mental status: No episodes since last visit. Follow-up neuropsychiatric testing results today. Initiate MRI scans. Repeat CBC and fungal culture due to significant eosinophilia observed last visit.    Follow-up in 3 months for routine maintenance.    Patient or patient representative verbalized consent for the use of Ambient Listening during the visit with  Samuel Huerta MD for chart documentation. 5/12/2025  13:04 EDT    *Dictated after leaving exam room.     Samuel Huerta MD   13:03 EDT   05/12/25

## 2025-05-12 NOTE — PATIENT INSTRUCTIONS
If Semaglutide is covered, stop taking glipizide once you start injections.     Let me know after 4th weekly injection how you're tolerating the medicine - if you do not have any side effects, we will increase up to 0.5mg every week.

## 2025-05-14 ENCOUNTER — LAB (OUTPATIENT)
Dept: LAB | Facility: HOSPITAL | Age: 60
End: 2025-05-14
Payer: COMMERCIAL

## 2025-05-14 DIAGNOSIS — R40.4 TRANSIENT ALTERATION OF AWARENESS: ICD-10-CM

## 2025-05-14 DIAGNOSIS — D72.10 EOSINOPHILIA, UNSPECIFIED TYPE: ICD-10-CM

## 2025-05-14 DIAGNOSIS — B49 FUNGEMIA: ICD-10-CM

## 2025-05-14 LAB
BASOPHILS # BLD AUTO: 0.06 10*3/MM3 (ref 0–0.2)
BASOPHILS NFR BLD AUTO: 0.7 % (ref 0–1.5)
DEPRECATED RDW RBC AUTO: 39.7 FL (ref 37–54)
EOSINOPHIL # BLD AUTO: 1.94 10*3/MM3 (ref 0–0.4)
EOSINOPHIL NFR BLD AUTO: 24 % (ref 0.3–6.2)
ERYTHROCYTE [DISTWIDTH] IN BLOOD BY AUTOMATED COUNT: 12.4 % (ref 12.3–15.4)
HCT VFR BLD AUTO: 41.1 % (ref 37.5–51)
HGB BLD-MCNC: 14.5 G/DL (ref 13–17.7)
IMM GRANULOCYTES # BLD AUTO: 0.02 10*3/MM3 (ref 0–0.05)
IMM GRANULOCYTES NFR BLD AUTO: 0.2 % (ref 0–0.5)
LYMPHOCYTES # BLD AUTO: 1.33 10*3/MM3 (ref 0.7–3.1)
LYMPHOCYTES NFR BLD AUTO: 16.4 % (ref 19.6–45.3)
MCH RBC QN AUTO: 31 PG (ref 26.6–33)
MCHC RBC AUTO-ENTMCNC: 35.3 G/DL (ref 31.5–35.7)
MCV RBC AUTO: 87.8 FL (ref 79–97)
MONOCYTES # BLD AUTO: 0.67 10*3/MM3 (ref 0.1–0.9)
MONOCYTES NFR BLD AUTO: 8.3 % (ref 5–12)
NEUTROPHILS NFR BLD AUTO: 4.07 10*3/MM3 (ref 1.7–7)
NEUTROPHILS NFR BLD AUTO: 50.4 % (ref 42.7–76)
NRBC BLD AUTO-RTO: 0 /100 WBC (ref 0–0.2)
PLATELET # BLD AUTO: 154 10*3/MM3 (ref 140–450)
PMV BLD AUTO: 9.6 FL (ref 6–12)
RBC # BLD AUTO: 4.68 10*6/MM3 (ref 4.14–5.8)
WBC NRBC COR # BLD AUTO: 8.09 10*3/MM3 (ref 3.4–10.8)

## 2025-05-14 PROCEDURE — 36415 COLL VENOUS BLD VENIPUNCTURE: CPT | Performed by: FAMILY MEDICINE

## 2025-05-14 PROCEDURE — 87103 BLOOD FUNGUS CULTURE: CPT | Performed by: FAMILY MEDICINE

## 2025-05-14 PROCEDURE — 85025 COMPLETE CBC W/AUTO DIFF WBC: CPT | Performed by: FAMILY MEDICINE

## 2025-05-21 ENCOUNTER — TELEPHONE (OUTPATIENT)
Dept: SPORTS MEDICINE | Facility: CLINIC | Age: 60
End: 2025-05-21
Payer: COMMERCIAL

## 2025-05-21 LAB — FUNGUS WND CULT: NORMAL

## 2025-05-21 NOTE — TELEPHONE ENCOUNTER
Patient would like to discuss Lab work results, concerned about CBC and blood cultures.  Please contact to discuss    Also wants to discuss Ozempic prescription as it is affecting Glucose readings, does there need to be a change made?  Should he be concerned?

## 2025-05-28 LAB — FUNGUS WND CULT: NORMAL

## 2025-06-04 LAB — FUNGUS WND CULT: NORMAL

## 2025-06-05 DIAGNOSIS — F90.0 ATTENTION DEFICIT HYPERACTIVITY DISORDER (ADHD), PREDOMINANTLY INATTENTIVE TYPE: ICD-10-CM

## 2025-06-05 RX ORDER — DEXTROAMPHETAMINE SACCHARATE, AMPHETAMINE ASPARTATE MONOHYDRATE, DEXTROAMPHETAMINE SULFATE AND AMPHETAMINE SULFATE 5; 5; 5; 5 MG/1; MG/1; MG/1; MG/1
20 CAPSULE, EXTENDED RELEASE ORAL DAILY
Qty: 30 CAPSULE | Refills: 0 | Status: SHIPPED | OUTPATIENT
Start: 2025-06-05

## 2025-06-10 RX ORDER — SEMAGLUTIDE 0.68 MG/ML
0.5 INJECTION, SOLUTION SUBCUTANEOUS WEEKLY
Qty: 3 ML | Refills: 1 | Status: SHIPPED | OUTPATIENT
Start: 2025-06-10

## 2025-06-10 NOTE — TELEPHONE ENCOUNTER
Called and spoke with patient, he clarified that he is currently taking 0.25 mg and would like to go up to 0.5 mg. Please advise,

## 2025-06-11 LAB — FUNGUS WND CULT: NORMAL

## 2025-07-09 DIAGNOSIS — F90.0 ATTENTION DEFICIT HYPERACTIVITY DISORDER (ADHD), PREDOMINANTLY INATTENTIVE TYPE: ICD-10-CM

## 2025-07-09 RX ORDER — DEXTROAMPHETAMINE SACCHARATE, AMPHETAMINE ASPARTATE MONOHYDRATE, DEXTROAMPHETAMINE SULFATE AND AMPHETAMINE SULFATE 5; 5; 5; 5 MG/1; MG/1; MG/1; MG/1
20 CAPSULE, EXTENDED RELEASE ORAL DAILY
Qty: 30 CAPSULE | Refills: 0 | Status: SHIPPED | OUTPATIENT
Start: 2025-07-09

## 2025-07-21 RX ORDER — SEMAGLUTIDE 0.68 MG/ML
INJECTION, SOLUTION SUBCUTANEOUS
Qty: 3 ML | Refills: 1 | Status: SHIPPED | OUTPATIENT
Start: 2025-07-21

## 2025-07-31 NOTE — TELEPHONE ENCOUNTER
Patient called, packing to leave town (around noon today) and realized will be out of prescription during trip. Apologizes for last minute request but hoping to get filled asap. Please advise.

## 2025-08-11 DIAGNOSIS — F90.0 ATTENTION DEFICIT HYPERACTIVITY DISORDER (ADHD), PREDOMINANTLY INATTENTIVE TYPE: ICD-10-CM

## 2025-08-12 ENCOUNTER — CONSULT (OUTPATIENT)
Dept: ONCOLOGY | Facility: CLINIC | Age: 60
End: 2025-08-12
Payer: COMMERCIAL

## 2025-08-12 ENCOUNTER — LAB (OUTPATIENT)
Dept: LAB | Facility: HOSPITAL | Age: 60
End: 2025-08-12
Payer: COMMERCIAL

## 2025-08-12 VITALS
HEART RATE: 83 BPM | DIASTOLIC BLOOD PRESSURE: 79 MMHG | BODY MASS INDEX: 26.61 KG/M2 | SYSTOLIC BLOOD PRESSURE: 152 MMHG | HEIGHT: 73 IN | WEIGHT: 200.8 LBS | TEMPERATURE: 98.3 F | OXYGEN SATURATION: 99 %

## 2025-08-12 DIAGNOSIS — D50.0 IRON DEFICIENCY ANEMIA DUE TO CHRONIC BLOOD LOSS: ICD-10-CM

## 2025-08-12 DIAGNOSIS — D72.10 EOSINOPHILIA, UNSPECIFIED TYPE: Primary | ICD-10-CM

## 2025-08-12 DIAGNOSIS — D72.10 EOSINOPHILIA, UNSPECIFIED TYPE: ICD-10-CM

## 2025-08-12 LAB
ALBUMIN SERPL-MCNC: 4.5 G/DL (ref 3.5–5.2)
ALBUMIN/GLOB SERPL: 2 G/DL
ALP SERPL-CCNC: 73 U/L (ref 39–117)
ALT SERPL W P-5'-P-CCNC: 27 U/L (ref 1–41)
ANION GAP SERPL CALCULATED.3IONS-SCNC: 12.3 MMOL/L (ref 5–15)
AST SERPL-CCNC: 24 U/L (ref 1–40)
BASOPHILS # BLD AUTO: 0.09 10*3/MM3 (ref 0–0.2)
BASOPHILS NFR BLD AUTO: 1.1 % (ref 0–1.5)
BILIRUB SERPL-MCNC: 0.9 MG/DL (ref 0–1.2)
BUN SERPL-MCNC: 21.2 MG/DL (ref 8–23)
BUN/CREAT SERPL: 23.8 (ref 7–25)
CALCIUM SPEC-SCNC: 9.5 MG/DL (ref 8.6–10.5)
CHLORIDE SERPL-SCNC: 102 MMOL/L (ref 98–107)
CO2 SERPL-SCNC: 25.7 MMOL/L (ref 22–29)
CREAT SERPL-MCNC: 0.89 MG/DL (ref 0.76–1.27)
DEPRECATED RDW RBC AUTO: 43.5 FL (ref 37–54)
EGFRCR SERPLBLD CKD-EPI 2021: 98.1 ML/MIN/1.73
EOSINOPHIL # BLD AUTO: 1.13 10*3/MM3 (ref 0–0.4)
EOSINOPHIL NFR BLD AUTO: 13.2 % (ref 0.3–6.2)
ERYTHROCYTE [DISTWIDTH] IN BLOOD BY AUTOMATED COUNT: 12.8 % (ref 12.3–15.4)
FERRITIN SERPL-MCNC: 111 NG/ML (ref 30–400)
GLOBULIN UR ELPH-MCNC: 2.3 GM/DL
GLUCOSE SERPL-MCNC: 174 MG/DL (ref 65–99)
HCT VFR BLD AUTO: 39.8 % (ref 37.5–51)
HGB BLD-MCNC: 13.2 G/DL (ref 13–17.7)
IMM GRANULOCYTES # BLD AUTO: 0.03 10*3/MM3 (ref 0–0.05)
IMM GRANULOCYTES NFR BLD AUTO: 0.4 % (ref 0–0.5)
IRON 24H UR-MRATE: 99 MCG/DL (ref 59–158)
IRON SATN MFR SERPL: 27 % (ref 20–50)
LDH SERPL-CCNC: 193 U/L (ref 135–225)
LYMPHOCYTES # BLD AUTO: 1.34 10*3/MM3 (ref 0.7–3.1)
LYMPHOCYTES NFR BLD AUTO: 15.7 % (ref 19.6–45.3)
MCH RBC QN AUTO: 30.6 PG (ref 26.6–33)
MCHC RBC AUTO-ENTMCNC: 33.2 G/DL (ref 31.5–35.7)
MCV RBC AUTO: 92.1 FL (ref 79–97)
MONOCYTES # BLD AUTO: 0.73 10*3/MM3 (ref 0.1–0.9)
MONOCYTES NFR BLD AUTO: 8.5 % (ref 5–12)
NEUTROPHILS NFR BLD AUTO: 5.24 10*3/MM3 (ref 1.7–7)
NEUTROPHILS NFR BLD AUTO: 61.1 % (ref 42.7–76)
NRBC BLD AUTO-RTO: 0 /100 WBC (ref 0–0.2)
PLATELET # BLD AUTO: 157 10*3/MM3 (ref 140–450)
PMV BLD AUTO: 9.1 FL (ref 6–12)
POTASSIUM SERPL-SCNC: 4.5 MMOL/L (ref 3.5–5.2)
PROT SERPL-MCNC: 6.8 G/DL (ref 6–8.5)
RBC # BLD AUTO: 4.32 10*6/MM3 (ref 4.14–5.8)
SODIUM SERPL-SCNC: 140 MMOL/L (ref 136–145)
TIBC SERPL-MCNC: 364 MCG/DL (ref 298–536)
TRANSFERRIN SERPL-MCNC: 244 MG/DL (ref 200–360)
WBC NRBC COR # BLD AUTO: 8.56 10*3/MM3 (ref 3.4–10.8)

## 2025-08-12 PROCEDURE — 80053 COMPREHEN METABOLIC PANEL: CPT

## 2025-08-12 PROCEDURE — 36415 COLL VENOUS BLD VENIPUNCTURE: CPT

## 2025-08-12 PROCEDURE — 83615 LACTATE (LD) (LDH) ENZYME: CPT

## 2025-08-12 PROCEDURE — 82728 ASSAY OF FERRITIN: CPT | Performed by: INTERNAL MEDICINE

## 2025-08-12 PROCEDURE — 84466 ASSAY OF TRANSFERRIN: CPT | Performed by: INTERNAL MEDICINE

## 2025-08-12 PROCEDURE — 85025 COMPLETE CBC W/AUTO DIFF WBC: CPT

## 2025-08-12 PROCEDURE — 83540 ASSAY OF IRON: CPT | Performed by: INTERNAL MEDICINE

## 2025-08-13 RX ORDER — DEXTROAMPHETAMINE SACCHARATE, AMPHETAMINE ASPARTATE MONOHYDRATE, DEXTROAMPHETAMINE SULFATE AND AMPHETAMINE SULFATE 5; 5; 5; 5 MG/1; MG/1; MG/1; MG/1
20 CAPSULE, EXTENDED RELEASE ORAL DAILY
Qty: 30 CAPSULE | Refills: 0 | Status: SHIPPED | OUTPATIENT
Start: 2025-08-13